# Patient Record
Sex: FEMALE | Race: WHITE | NOT HISPANIC OR LATINO | Employment: OTHER | URBAN - METROPOLITAN AREA
[De-identification: names, ages, dates, MRNs, and addresses within clinical notes are randomized per-mention and may not be internally consistent; named-entity substitution may affect disease eponyms.]

---

## 2017-01-04 ENCOUNTER — RADIANT APPOINTMENT (OUTPATIENT)
Dept: GENERAL RADIOLOGY | Facility: CLINIC | Age: 67
End: 2017-01-04
Attending: PHYSICIAN ASSISTANT
Payer: COMMERCIAL

## 2017-01-04 ENCOUNTER — OFFICE VISIT (OUTPATIENT)
Dept: FAMILY MEDICINE | Facility: CLINIC | Age: 67
End: 2017-01-04
Payer: COMMERCIAL

## 2017-01-04 VITALS
DIASTOLIC BLOOD PRESSURE: 90 MMHG | SYSTOLIC BLOOD PRESSURE: 194 MMHG | TEMPERATURE: 98.5 F | BODY MASS INDEX: 24.1 KG/M2 | OXYGEN SATURATION: 99 % | HEIGHT: 63 IN | WEIGHT: 136 LBS | HEART RATE: 84 BPM

## 2017-01-04 DIAGNOSIS — I10 HTN, GOAL BELOW 140/90: ICD-10-CM

## 2017-01-04 DIAGNOSIS — M25.561 PAIN IN BOTH KNEES, UNSPECIFIED CHRONICITY: Primary | ICD-10-CM

## 2017-01-04 DIAGNOSIS — M25.562 PAIN IN BOTH KNEES, UNSPECIFIED CHRONICITY: Primary | ICD-10-CM

## 2017-01-04 DIAGNOSIS — H91.93 HEARING IMPAIRED, BILATERAL: ICD-10-CM

## 2017-01-04 DIAGNOSIS — H61.21 IMPACTED CERUMEN OF RIGHT EAR: ICD-10-CM

## 2017-01-04 LAB
ANION GAP SERPL CALCULATED.3IONS-SCNC: 8 MMOL/L (ref 3–14)
BUN SERPL-MCNC: 20 MG/DL (ref 7–30)
CALCIUM SERPL-MCNC: 9 MG/DL (ref 8.5–10.1)
CHLORIDE SERPL-SCNC: 104 MMOL/L (ref 94–109)
CO2 SERPL-SCNC: 26 MMOL/L (ref 20–32)
CREAT SERPL-MCNC: 0.84 MG/DL (ref 0.52–1.04)
GFR SERPL CREATININE-BSD FRML MDRD: 67 ML/MIN/1.7M2
GLUCOSE SERPL-MCNC: 101 MG/DL (ref 70–99)
POTASSIUM SERPL-SCNC: 4 MMOL/L (ref 3.4–5.3)
SODIUM SERPL-SCNC: 138 MMOL/L (ref 133–144)

## 2017-01-04 PROCEDURE — 99214 OFFICE O/P EST MOD 30 MIN: CPT | Mod: 25 | Performed by: PHYSICIAN ASSISTANT

## 2017-01-04 PROCEDURE — 73562 X-RAY EXAM OF KNEE 3: CPT | Mod: RT

## 2017-01-04 PROCEDURE — 73562 X-RAY EXAM OF KNEE 3: CPT | Mod: LT

## 2017-01-04 PROCEDURE — 80048 BASIC METABOLIC PNL TOTAL CA: CPT | Performed by: PHYSICIAN ASSISTANT

## 2017-01-04 PROCEDURE — 69209 REMOVE IMPACTED EAR WAX UNI: CPT | Performed by: PHYSICIAN ASSISTANT

## 2017-01-04 PROCEDURE — 36415 COLL VENOUS BLD VENIPUNCTURE: CPT | Performed by: PHYSICIAN ASSISTANT

## 2017-01-04 RX ORDER — AMLODIPINE BESYLATE 2.5 MG/1
2.5 TABLET ORAL DAILY
Qty: 30 TABLET | Refills: 0 | Status: SHIPPED | OUTPATIENT
Start: 2017-01-04 | End: 2017-01-11

## 2017-01-04 NOTE — PATIENT INSTRUCTIONS
May take tylenol as needed for discomfort.     Follow up with a primary care provider next week for a recheck of your blood pressure.     Follow up with ENT provider in regards to your hearing and ear symptoms.    Follow up with orthopedics for further evaluation and care of your knee pain.  High Blood Pressure, New, Begin Treatment  Your blood pressure was high enough today to start treatment with medicines. Often health care providers don t know what causes high blood pressure (hypertension). But it can be controlled with lifestyle changes and medicines. High blood pressure usually has no symptoms. But it can sometimes cause headache, dizziness, blurred vision, a rushing sound in your ears, chest pain, or shortness of breath. But even without symptoms, high blood pressure that s not treated raises your risk for heart attack and stroke. High blood pressure is a serious health risk and shouldn t be ignored.    A blood pressure reading is made up of two numbers: a higher number over a lower number. The top number is the systolic pressure. The bottom number is the diastolic pressure. A normal blood pressure is less than 120 over less than 80.  High blood pressure is when either the top number is 140 or higher, or the bottom number is 90 or higher. This must be the result when taking your blood pressure a number of times. The blood pressures between normal and high are called prehypertension.  Home care  If you have high blood pressure, you should do what is listed below to lower your blood pressure. If you are taking medicines for high blood pressure, these methods may reduce or end your need for medicines in the future.    Begin a weight-loss program if you are overweight.    Cut back on how much salt you get in your diet. Here s how to do this:    Don t eat foods that have a lot of salt. These include olives, pickles, smoked meats, and salted potato chips.    Don t add salt to your food at the table.    Use only  small amounts of salt when cooking.    Begin an exercise program. Talk with your health care provider about the type of exercise program that would be best for you. It doesn't have to be hard. Even brisk walking for 20 minutes 3 times a week is a good form of exercise.    Don t take medicines that have heart stimulants. This includes many cold and sinus decongestant pills and sprays, as well as diet pills. Check the warnings about hypertension on the label. Stimulants such as amphetamine or cocaine could be lethal for someone with high blood pressure. Never take these.    Limit how much caffeine you get in your diet. Switch to caffeine-free products.    Stop smoking. If you are a long-time smoker, this can be hard. Enroll in a stop-smoking program to make it more likely that you will quit for good.    Learn how to handle stress. This is an important part of any program to lower blood pressure. Learn about relaxation methods like meditation, yoga, or biofeedback.    If your provider prescribed medicines, take them exactly as directed. Missing doses may cause your blood pressure get out of control.    Consider buying an automatic blood pressure machine. You can get one of these at most pharmacies. Use this to watch your blood pressure at home. Give the results to your provider.  Follow-up care  Because a new blood pressure medicine was started today, it s important that you have your blood pressure rechecked. This is to make sure that the medicine is working and that you have no serious side effects. Unless told otherwise, follow up with your health care provider or this facility within the next 3 days.  When to seek medical advice  Call your health care provider right away if any of these occur:    Chest pain or shortness of breath    Severe headache    Throbbing or rushing sound in the ears    Nosebleed    Sudden severe pain in your belly (abdomen)    Extreme drowsiness, confusion, or fainting    Dizziness or  dizziness with a spinning sensation (vertigo)    Weakness of an arm or leg or one side of the face    You have problems speaking or seeing     1865-1171 [employer]. [address] [place] [address], [place] [address]. All rights reserved. This information is not intended as a substitute for professional medical care. Always follow your healthcare professional's instructions.

## 2017-01-04 NOTE — Clinical Note
[facility]  [address] [facility] [address] [phone]        January 5, 2017    Dora Luna  [address] [facility] [address]            Dear Dora,    The radiologist reviewed both of your knee x-rays. They did not see evidence of an acute fracture. They did note degenerative changes. Please continue the treatment plan as we discussed at your office visit. Please call the clinic if you have any questions. Thank you.    Radha Palomo PA-C    Results for orders placed or performed in visit on 01/04/17   XR Knee Left 3 Views    Narrative    KNEE LEFT THREE VIEWS 1/4/2017 3:56 PM     HISTORY: Pain in right knee, pain in left knee       Impression    IMPRESSION: Three views of the left knee are performed. Moderate joint  space narrowing in the medial joint compartment and patellofemoral  joint compartments are noted. Minimal narrowing in the lateral joint  compartment of the knee. No fracture or dislocation. Posterior  patellar osteophytes are noted. No suprapatellar effusion is  appreciated.    KOKI CONTRERAS MD   XR Knee Right 3 Views    Narrative    RIGHT KNEE THREE VIEWS 1/4/2017 3:51 PM     HISTORY: Pain in right knee. Pain in left knee.       Impression    IMPRESSION: Three views of the right knee are performed. Mild  degenerative changes are noted in all 3 joint compartments of the  right knee including the medial, lateral and patellofemoral joint  spaces. No fracture or dislocation. Moderate joint space narrowing is  noted in the medial joint compartment. There is slight irregularity of  the cortical surface of the medial femoral condyle of uncertain  significance. Old injury remains in the differential. If patient has  persistent pain a follow-up MRI could be performed. There is narrowing  in the medial patellar facet on the sunrise view.    KOKI CONTRERAS MD   Basic metabolic panel   Result Value Ref Range    Sodium 138 133 - 144 mmol/L    Potassium 4.0 3.4 - 5.3 mmol/L     Chloride 104 94 - 109 mmol/L    Carbon Dioxide 26 20 - 32 mmol/L    Anion Gap 8 3 - 14 mmol/L    Glucose 101 (H) 70 - 99 mg/dL    Urea Nitrogen 20 7 - 30 mg/dL    Creatinine 0.84 0.52 - 1.04 mg/dL    GFR Estimate 67 >60 mL/min/1.7m2    GFR Estimate If Black 82 >60 mL/min/1.7m2    Calcium 9.0 8.5 - 10.1 mg/dL

## 2017-01-04 NOTE — PROGRESS NOTES
SUBJECTIVE:                                                    Dora Luna is a 66 year old female who presents to clinic today for the following health issues:      Concern - Pain     Onset: Years, increased 6-8 months.     Description:   Pain in knees      Intensity: moderate    Progression of Symptoms:  same    Accompanying Signs & Symptoms:  Denies swelling, numbness.  Weakness in knees, patient states they give out at times when in use.        Previous history of similar problem:   None    Precipitating factors:   Worsened by: Sitting on floor.     Alleviating factors:  Improved by: None       Therapies Tried and outcome: Ibuprofen, Excedrin, OTC arthritis medication.     Ibuprofen is not helping. Pain is worse in the left knee. She feels a grinding pain in the left knee. She is starting to have pain in the right knee and hip. Denies any injuries, swelling, bruising, erythema, numbness, and tingling.       Concern - Hearing Problems     Onset: 12/27/16     Description:   Difficulty hearing    Intensity: moderate    Progression of Symptoms:  same    Accompanying Signs & Symptoms:  Patient states feels as if she is in a tunnel and needs to turn up the volume and have people repeat things/questions.     Occasional right ear popping which helps the hearing improve for short periods of time in the right ear only.        Previous history of similar problem:   None    Precipitating factors:   Worsened by: None    Alleviating factors:  Improved by: None       Therapies Tried and outcome: Ear drops to clean ear wax.       Elevated BP in clinic. Patient has not seen a primary care provider in many years since her's retired. She does not feel the need to do any preventative care as all of her family lives into their late 90's early 100's. Therefore, she declines any preventative measures today. She denies family history of cancer. Her mother does have hypertension. She denies frequent headaches, vision changes,  "dizziness, chest pain, palpitations, and peripheral swelling. She denies any other concerns.     Problem list and histories reviewed & adjusted, as indicated.  Additional history: none    Patient Active Problem List   Diagnosis     Advanced directives, counseling/discussion     Past Surgical History   Procedure Laterality Date     Appendectomy         Social History   Substance Use Topics     Smoking status: Never Smoker      Smokeless tobacco: Not on file     Alcohol Use: No     Family History   Problem Relation Age of Onset     Hypertension Mother          Current Outpatient Prescriptions   Medication Sig Dispense Refill     amLODIPine (NORVASC) 2.5 MG tablet Take 1 tablet (2.5 mg) by mouth daily 30 tablet 0     DiphenhydrAMINE HCl (SIMPLY ALLERGY PO) Take by mouth daily as needed       No Known Allergies  BP Readings from Last 3 Encounters:   01/04/17 194/90   02/26/16 194/83    Wt Readings from Last 3 Encounters:   01/04/17 136 lb (61.689 kg)   02/26/16 137 lb (62.143 kg)                  Problem list, Medication list, Allergies, and Medical/Social/Surgical histories reviewed in [facility] and updated as appropriate.    ROS:  Constitutional, HEENT, cardiovascular, pulmonary, musculoskeletal and integumentary systems are negative, except as otherwise noted.    OBJECTIVE:                                                    /90 mmHg  Pulse 84  Temp(Src) 98.5  F (36.9  C) (Oral)  Ht 5' 2.75\" (1.594 m)  Wt 136 lb (61.689 kg)  BMI 24.28 kg/m2  SpO2 99%  Body mass index is 24.28 kg/(m^2).  GENERAL: healthy, alert and no distress  EYES: Eyes grossly normal to inspection, PERRL and EOMI  HENT: normal cephalic/atraumatic, Right ear - impacted with cerumen, Left ear - unremarkable, nose and mouth without ulcers or lesions, oropharynx clear and oral mucous membranes moist  NECK: no adenopathy, no asymmetry, masses, or scars and thyroid normal to palpation  RESP: lungs clear to auscultation - no rales, rhonchi or " wheezes  CV: regular rate and rhythm, normal S1 S2, no S3 or S4, no murmur, click or rub, no peripheral edema and peripheral pulses strong  MS: Bilateral knees: tenderness to palpation over the patella and lateral aspects bilaterally, no significant crepitus with range of motion, good range of motion without significant pain, no laxity or pain with valgus and varus stress, negative anterior and posterior drawer, no significant swelling, normal sensation and capillary refill bilaterally  SKIN: no suspicious lesions or rashes  NEURO: Normal strength and tone, mentation intact and speech normal      Re-examination after ear flushing by Medical Assistant: cerumen partially removed. TM unremarkable. Patient tolerated well.    Diagnostic Test Results:  BMP is pending  Xray of bilateral knees- degenerative changes noted, no obvious fracture seen. Radiology read is pending     ASSESSMENT/PLAN:                                                    1. Pain in both knees, unspecified chronicity  Degenerative changes noted on x-ray. Patient would like referral to orthopedics to discuss possible injections  - XR Knee Left 3 Views  - XR Knee Right 3 Views  - ORTHO  REFERRAL    2. Impacted cerumen of right ear  - REMOVE IMPACTED CERUMEN    3. Hearing impaired, bilateral  - OTOLARYNGOLOGY REFERRAL    4. HTN, goal below 140/90  New diagnosis   - amLODIPine (NORVASC) 2.5 MG tablet; Take 1 tablet (2.5 mg) by mouth daily  Dispense: 30 tablet; Refill: 0  - Basic metabolic panel    Patient Instructions   May take tylenol as needed for discomfort.     Follow up with a primary care provider next week for a recheck of your blood pressure.     Follow up with ENT provider in regards to your hearing and ear symptoms.    Follow up with orthopedics for further evaluation and care of your knee pain.  High Blood Pressure, New, Begin Treatment  Your blood pressure was high enough today to start treatment with medicines. Often health care  providers don t know what causes high blood pressure (hypertension). But it can be controlled with lifestyle changes and medicines. High blood pressure usually has no symptoms. But it can sometimes cause headache, dizziness, blurred vision, a rushing sound in your ears, chest pain, or shortness of breath. But even without symptoms, high blood pressure that s not treated raises your risk for heart attack and stroke. High blood pressure is a serious health risk and shouldn t be ignored.    A blood pressure reading is made up of two numbers: a higher number over a lower number. The top number is the systolic pressure. The bottom number is the diastolic pressure. A normal blood pressure is less than 120 over less than 80.  High blood pressure is when either the top number is 140 or higher, or the bottom number is 90 or higher. This must be the result when taking your blood pressure a number of times. The blood pressures between normal and high are called prehypertension.  Home care  If you have high blood pressure, you should do what is listed below to lower your blood pressure. If you are taking medicines for high blood pressure, these methods may reduce or end your need for medicines in the future.    Begin a weight-loss program if you are overweight.    Cut back on how much salt you get in your diet. Here s how to do this:    Don t eat foods that have a lot of salt. These include olives, pickles, smoked meats, and salted potato chips.    Don t add salt to your food at the table.    Use only small amounts of salt when cooking.    Begin an exercise program. Talk with your health care provider about the type of exercise program that would be best for you. It doesn't have to be hard. Even brisk walking for 20 minutes 3 times a week is a good form of exercise.    Don t take medicines that have heart stimulants. This includes many cold and sinus decongestant pills and sprays, as well as diet pills. Check the warnings about  hypertension on the label. Stimulants such as amphetamine or cocaine could be lethal for someone with high blood pressure. Never take these.    Limit how much caffeine you get in your diet. Switch to caffeine-free products.    Stop smoking. If you are a long-time smoker, this can be hard. Enroll in a stop-smoking program to make it more likely that you will quit for good.    Learn how to handle stress. This is an important part of any program to lower blood pressure. Learn about relaxation methods like meditation, yoga, or biofeedback.    If your provider prescribed medicines, take them exactly as directed. Missing doses may cause your blood pressure get out of control.    Consider buying an automatic blood pressure machine. You can get one of these at most pharmacies. Use this to watch your blood pressure at home. Give the results to your provider.  Follow-up care  Because a new blood pressure medicine was started today, it s important that you have your blood pressure rechecked. This is to make sure that the medicine is working and that you have no serious side effects. Unless told otherwise, follow up with your health care provider or this facility within the next 3 days.  When to seek medical advice  Call your health care provider right away if any of these occur:    Chest pain or shortness of breath    Severe headache    Throbbing or rushing sound in the ears    Nosebleed    Sudden severe pain in your belly (abdomen)    Extreme drowsiness, confusion, or fainting    Dizziness or dizziness with a spinning sensation (vertigo)    Weakness of an arm or leg or one side of the face    You have problems speaking or seeing     [id number] [employer]. [address] [place] [address], [place] [address]. All rights reserved. This information is not intended as a substitute for professional medical care. Always follow your healthcare professional's instructions.              Radha Palomo PA-C  [facility]  [place]

## 2017-01-04 NOTE — Clinical Note
[facility]  [address] [facility] [id number] [phone]        January 5, 2017    Dora Luna  [address] [facility] [address]            Dear Dora,    Attached is your recent lab. The basic metabolic panel, which evaluates your kidney function, electrolytes, and blood sugar was normal. Please follow up as instructed at your office visit. Thank you.    Radha Palomo PA-C    Results for orders placed or performed in visit on 01/04/17   XR Knee Left 3 Views    Narrative    KNEE LEFT THREE VIEWS 1/4/2017 3:56 PM     HISTORY: Pain in right knee, pain in left knee       Impression    IMPRESSION: Three views of the left knee are performed. Moderate joint  space narrowing in the medial joint compartment and patellofemoral  joint compartments are noted. Minimal narrowing in the lateral joint  compartment of the knee. No fracture or dislocation. Posterior  patellar osteophytes are noted. No suprapatellar effusion is  appreciated.    KOKI CONTRERAS MD   XR Knee Right 3 Views    Narrative    RIGHT KNEE THREE VIEWS 1/4/2017 3:51 PM     HISTORY: Pain in right knee. Pain in left knee.       Impression    IMPRESSION: Three views of the right knee are performed. Mild  degenerative changes are noted in all 3 joint compartments of the  right knee including the medial, lateral and patellofemoral joint  spaces. No fracture or dislocation. Moderate joint space narrowing is  noted in the medial joint compartment. There is slight irregularity of  the cortical surface of the medial femoral condyle of uncertain  significance. Old injury remains in the differential. If patient has  persistent pain a follow-up MRI could be performed. There is narrowing  in the medial patellar facet on the sunrise view.    KOKI CONTRERAS MD   Basic metabolic panel   Result Value Ref Range    Sodium 138 133 - 144 mmol/L    Potassium 4.0 3.4 - 5.3 mmol/L    Chloride 104 94 - 109 mmol/L    Carbon Dioxide 26 20 - 32 mmol/L     Anion Gap 8 3 - 14 mmol/L    Glucose 101 (H) 70 - 99 mg/dL    Urea Nitrogen 20 7 - 30 mg/dL    Creatinine 0.84 0.52 - 1.04 mg/dL    GFR Estimate 67 >60 mL/min/1.7m2    GFR Estimate If Black 82 >60 mL/min/1.7m2    Calcium 9.0 8.5 - 10.1 mg/dL

## 2017-01-04 NOTE — MR AVS SNAPSHOT
After Visit Summary   1/4/2017    Dora Luna    MRN: [id number]           Patient Information     Date Of Birth          1950        Visit Information        Provider Department      1/4/2017 3:00 PM Radha Palomo PA-C [facility]        Today's Diagnoses     Pain in both knees, unspecified chronicity    -  1     Impacted cerumen of right ear         Hearing impaired, bilateral         HTN, goal below 140/90           Care Instructions    May take tylenol as needed for discomfort.     Follow up with a primary care provider next week for a recheck of your blood pressure.     Follow up with ENT provider in regards to your hearing and ear symptoms.    Follow up with orthopedics for further evaluation and care of your knee pain.  High Blood Pressure, New, Begin Treatment  Your blood pressure was high enough today to start treatment with medicines. Often health care providers don t know what causes high blood pressure (hypertension). But it can be controlled with lifestyle changes and medicines. High blood pressure usually has no symptoms. But it can sometimes cause headache, dizziness, blurred vision, a rushing sound in your ears, chest pain, or shortness of breath. But even without symptoms, high blood pressure that s not treated raises your risk for heart attack and stroke. High blood pressure is a serious health risk and shouldn t be ignored.    A blood pressure reading is made up of two numbers: a higher number over a lower number. The top number is the systolic pressure. The bottom number is the diastolic pressure. A normal blood pressure is less than 120 over less than 80.  High blood pressure is when either the top number is 140 or higher, or the bottom number is 90 or higher. This must be the result when taking your blood pressure a number of times. The blood pressures between normal and high are called prehypertension.  Home care  If you have high blood pressure,  you should do what is listed below to lower your blood pressure. If you are taking medicines for high blood pressure, these methods may reduce or end your need for medicines in the future.    Begin a weight-loss program if you are overweight.    Cut back on how much salt you get in your diet. Here s how to do this:    Don t eat foods that have a lot of salt. These include olives, pickles, smoked meats, and salted potato chips.    Don t add salt to your food at the table.    Use only small amounts of salt when cooking.    Begin an exercise program. Talk with your health care provider about the type of exercise program that would be best for you. It doesn't have to be hard. Even brisk walking for 20 minutes 3 times a week is a good form of exercise.    Don t take medicines that have heart stimulants. This includes many cold and sinus decongestant pills and sprays, as well as diet pills. Check the warnings about hypertension on the label. Stimulants such as amphetamine or cocaine could be lethal for someone with high blood pressure. Never take these.    Limit how much caffeine you get in your diet. Switch to caffeine-free products.    Stop smoking. If you are a long-time smoker, this can be hard. Enroll in a stop-smoking program to make it more likely that you will quit for good.    Learn how to handle stress. This is an important part of any program to lower blood pressure. Learn about relaxation methods like meditation, yoga, or biofeedback.    If your provider prescribed medicines, take them exactly as directed. Missing doses may cause your blood pressure get out of control.    Consider buying an automatic blood pressure machine. You can get one of these at most pharmacies. Use this to watch your blood pressure at home. Give the results to your provider.  Follow-up care  Because a new blood pressure medicine was started today, it s important that you have your blood pressure rechecked. This is to make sure that the  medicine is working and that you have no serious side effects. Unless told otherwise, follow up with your health care provider or this facility within the next 3 days.  When to seek medical advice  Call your health care provider right away if any of these occur:    Chest pain or shortness of breath    Severe headache    Throbbing or rushing sound in the ears    Nosebleed    Sudden severe pain in your belly (abdomen)    Extreme drowsiness, confusion, or fainting    Dizziness or dizziness with a spinning sensation (vertigo)    Weakness of an arm or leg or one side of the face    You have problems speaking or seeing     [id number] [employer]. [address] [place] [address] 98266. All rights reserved. This information is not intended as a substitute for professional medical care. Always follow your healthcare professional's instructions.              Follow-ups after your visit        Additional Services     ORTHO  REFERRAL       [facility] is referring you to the Orthopedic  Services at [facility].       The [facility] Representative will assist you in the coordination of your Orthopedic and Musculoskeletal Care as prescribed by your physician.    The  Representative will call you within 1 business day to help schedule your appointment, or you may contact the  Representative at:    All areas ~ [phone]     Type of Referral : Non Surgical       Timeframe requested: 3 - 5 days    Coverage of these services is subject to the terms and limitations of your health insurance plan.  Please call member services at your health plan with any benefit or coverage questions.      If X-rays, CT or MRI's have been performed, please contact the facility where they were done to arrange for , prior to your scheduled appointment.  Please bring this referral request to your appointment and present it to your specialist.             "OTOLARYNGOLOGY REFERRAL       Your provider has referred you to: [facility]: [facility] [phone]   [email or web address]    Please be aware that coverage of these services is subject to the terms and limitations of your health insurance plan.  Call member services at your health plan with any benefit or coverage questions.      Please bring the following with you to your appointment:    (1) Any X-Rays, CTs or MRIs which have been performed.  Contact the facility where they were done to arrange for  prior to your scheduled appointment.   (2) List of current medications  (3) This referral request   (4) Any documents/labs given to you for this referral                  Who to contact     If you have questions or need follow up information about today's clinic visit or your schedule please contact [facility] directly at [phone].  Normal or non-critical lab and imaging results will be communicated to you by MyChart, letter or phone within 4 business days after the clinic has received the results. If you do not hear from us within 7 days, please contact the clinic through MyChart or phone. If you have a critical or abnormal lab result, we will notify you by phone as soon as possible.  Submit refill requests through path intelligence or call your pharmacy and they will forward the refill request to us. Please allow 3 business days for your refill to be completed.          Additional Information About Your Visit        [facility] Information     path intelligence lets you send messages to your doctor, view your test results, renew your prescriptions, schedule appointments and more. To sign up, go to [email or web address] . Click on \"Log in\" on the left side of the screen, which will take you to the Welcome page. Then click on \"Sign up Now\" on the right side of the page.     You will be asked to enter the access code listed below, as well as some personal information. Please " "follow the directions to create your username and password.     Your access code is: [email or web address]  Expires: 2017  4:20 PM     Your access code will  in 90 days. If you need help or a new code, please call your [place] clinic or [phone].        Care EveryWhere ID     This is your Care EveryWhere ID. This could be used by other organizations to access your [place] medical records  [id number]        Your Vitals Were     Pulse Temperature Height BMI (Body Mass Index) Pulse Oximetry       84 98.5  F (36.9  C) (Oral) 5' 2.75\" (1.594 m) 24.28 kg/m2 99%        Blood Pressure from Last 3 Encounters:   17 194/90   16 194/83    Weight from Last 3 Encounters:   17 136 lb (61.689 kg)   16 137 lb (62.143 kg)              We Performed the Following     Basic metabolic panel     ORTHO  REFERRAL     OTOLARYNGOLOGY REFERRAL     REMOVE IMPACTED CERUMEN     XR Knee Left 3 Views     XR Knee Right 3 Views          Today's Medication Changes          These changes are accurate as of: 17  4:20 PM.  If you have any questions, ask your nurse or doctor.               Start taking these medicines.        Dose/Directions    amLODIPine 2.5 MG tablet   Commonly known as:  NORVASC   Used for:  HTN, goal below 140/90   Started by:  Radha Palomo PA-C        Dose:  2.5 mg   Take 1 tablet (2.5 mg) by mouth daily   Quantity:  30 tablet   Refills:  0            Where to get your medicines      These medications were sent to [facility] [address] [address], [address]  [id number] [place] [address] [address], [facility] [address]     Phone:  [phone]    - amLODIPine 2.5 MG tablet             Primary Care Provider Office Phone # Fax #    [facility] [phone] [address] [address]. [facility] [address]        Thank you!     Thank you for choosing [facility]  for your care. Our goal is always to provide you with excellent care. " Hearing back from our patients is one way we can continue to improve our services. Please take a few minutes to complete the written survey that you may receive in the mail after your visit with us. Thank you!             Your Updated Medication List - Protect others around you: Learn how to safely use, store and throw away your medicines at www.disposemymeds.org.          This list is accurate as of: 1/4/17  4:20 PM.  Always use your most recent med list.                   Brand Name Dispense Instructions for use    amLODIPine 2.5 MG tablet    NORVASC    30 tablet    Take 1 tablet (2.5 mg) by mouth daily       SIMPLY ALLERGY PO      Take by mouth daily as needed

## 2017-01-04 NOTE — NURSING NOTE
"Chief Complaint   Patient presents with     Hearing Problem     Pain     Knees, hips, left hand, low back.        Mask not indicated for this appointment.     Initial /95 mmHg  Pulse 84  Temp(Src) 98.5  F (36.9  C) (Oral)  Ht 5' 2.75\" (1.594 m)  Wt 136 lb (61.689 kg)  BMI 24.28 kg/m2  SpO2 99% Estimated body mass index is 24.28 kg/(m^2) as calculated from the following:    Height as of this encounter: 5' 2.75\" (1.594 m).    Weight as of this encounter: 136 lb (61.689 kg)..  BP completed using cuff size: liane Farrell MA    "

## 2017-01-11 ENCOUNTER — OFFICE VISIT (OUTPATIENT)
Dept: INTERNAL MEDICINE | Facility: CLINIC | Age: 67
End: 2017-01-11
Payer: COMMERCIAL

## 2017-01-11 VITALS
OXYGEN SATURATION: 98 % | WEIGHT: 138.2 LBS | TEMPERATURE: 98.6 F | SYSTOLIC BLOOD PRESSURE: 149 MMHG | DIASTOLIC BLOOD PRESSURE: 80 MMHG | BODY MASS INDEX: 25.43 KG/M2 | HEART RATE: 90 BPM | HEIGHT: 62 IN

## 2017-01-11 DIAGNOSIS — Z78.0 ASYMPTOMATIC POSTMENOPAUSAL STATUS: ICD-10-CM

## 2017-01-11 DIAGNOSIS — I10 HTN, GOAL BELOW 140/90: ICD-10-CM

## 2017-01-11 DIAGNOSIS — Z23 NEED FOR PROPHYLACTIC VACCINATION AGAINST STREPTOCOCCUS PNEUMONIAE (PNEUMOCOCCUS): ICD-10-CM

## 2017-01-11 DIAGNOSIS — Z23 NEED FOR PROPHYLACTIC VACCINATION WITH TETANUS-DIPHTHERIA (TD): ICD-10-CM

## 2017-01-11 DIAGNOSIS — Z12.11 SCREEN FOR COLON CANCER: ICD-10-CM

## 2017-01-11 DIAGNOSIS — Z12.31 VISIT FOR SCREENING MAMMOGRAM: ICD-10-CM

## 2017-01-11 DIAGNOSIS — Z11.59 NEED FOR HEPATITIS C SCREENING TEST: ICD-10-CM

## 2017-01-11 DIAGNOSIS — I10 BENIGN ESSENTIAL HYPERTENSION: Primary | ICD-10-CM

## 2017-01-11 DIAGNOSIS — Z23 NEED FOR PROPHYLACTIC VACCINATION AND INOCULATION AGAINST INFLUENZA: ICD-10-CM

## 2017-01-11 PROCEDURE — 99213 OFFICE O/P EST LOW 20 MIN: CPT | Performed by: INTERNAL MEDICINE

## 2017-01-11 RX ORDER — AMLODIPINE BESYLATE 5 MG/1
5 TABLET ORAL DAILY
Qty: 30 TABLET | Refills: 1 | Status: SHIPPED | OUTPATIENT
Start: 2017-01-11 | End: 2017-02-07 | Stop reason: SINTOL

## 2017-01-11 NOTE — MR AVS SNAPSHOT
After Visit Summary   1/11/2017    Dora Luna    MRN: [id number]           Patient Information     Date Of Birth          1950        Visit Information        Provider Department      1/11/2017 8:00 AM Cassie Mauricio MD [facility]        Today's Diagnoses     Screen for colon cancer    -  1     Asymptomatic postmenopausal status         Visit for screening mammogram         Need for hepatitis C screening test         Need for prophylactic vaccination and inoculation against influenza         Need for prophylactic vaccination against Streptococcus pneumoniae (pneumococcus)         Need for prophylactic vaccination with tetanus-diphtheria (TD)         HTN, goal below 140/90           Care Instructions    Please increase your amlodipine from 2.5mg to 5mg daily.  return to clinic in 1 month for a blood pressure recheck.    Please consider submitting the stool test soon, to screen for colon cancer. You may  the kit in our lab.     Please consider getting a mammogram soon: you may call our clinic to schedule this appointment: [phone].    Please consider getting a bone density test: you may call our clinic to schedule this appointment: [phone].        Follow-ups after your visit        Future tests that were ordered for you today     Open Future Orders        Priority Expected Expires Ordered    DEXA HIP/PELVIS/SPINE - Future Routine  1/11/2018 1/11/2017    MA SCREENING DIGITAL BILAT - Future  (s+30) Routine  1/11/2018 1/11/2017    Fecal colorectal cancer screen (FIT) Routine 2/1/2017 4/5/2017 1/11/2017            Who to contact     If you have questions or need follow up information about today's clinic visit or your schedule please contact [facility] directly at [phone].  Normal or non-critical lab and imaging results will be communicated to you by MyChart, letter or phone within 4 business days after the clinic has received the  "results. If you do not hear from us within 7 days, please contact the clinic through [employer] or phone. If you have a critical or abnormal lab result, we will notify you by phone as soon as possible.  Submit refill requests through [employer] or call your pharmacy and they will forward the refill request to us. Please allow 3 business days for your refill to be completed.          Additional Information About Your Visit        [employer] Information     [employer] lets you send messages to your doctor, view your test results, renew your prescriptions, schedule appointments and more. To sign up, go to [email or web address] . Click on \"Log in\" on the left side of the screen, which will take you to the Welcome page. Then click on \"Sign up Now\" on the right side of the page.     You will be asked to enter the access code listed below, as well as some personal information. Please follow the directions to create your username and password.     Your access code is: [email or web address]  Expires: 2017  4:20 PM     Your access code will  in 90 days. If you need help or a new code, please call your [place] clinic or [phone].        Care EveryWhere ID     This is your Care EveryWhere ID. This could be used by other organizations to access your [place] medical records  [id number]        Your Vitals Were     Pulse Temperature Height BMI (Body Mass Index) Pulse Oximetry       90 98.6  F (37  C) (Oral) 5' 2\" (1.575 m) 25.27 kg/m2 98%        Blood Pressure from Last 3 Encounters:   17 149/80   17 194/90   16 194/83    Weight from Last 3 Encounters:   17 138 lb 3.2 oz (62.687 kg)   17 136 lb (61.689 kg)   16 137 lb (62.143 kg)                 Today's Medication Changes          These changes are accurate as of: 17  9:01 AM.  If you have any questions, ask your nurse or doctor.               These medicines have changed or have updated prescriptions.        Dose/Directions    amLODIPine 5 " MG tablet   Commonly known as:  NORVASC   This may have changed:    - medication strength  - how much to take   Used for:  HTN, goal below 140/90   Changed by:  Cassie Mauricio MD        Dose:  5 mg   Take 1 tablet (5 mg) by mouth daily   Quantity:  30 tablet   Refills:  1            Where to get your medicines      These medications were sent to [id number] - [place], [place] - [address] [address]  [address] [address], [place] [facility] [address]    Hours:  test fax successful 4/5/04  Phone:  [phone]    - amLODIPine 5 MG tablet             Primary Care Provider Office Phone # Fax #    [facility] [phone] [address] [facility]. [facility] [address]        Thank you!     Thank you for choosing [facility]  for your care. Our goal is always to provide you with excellent care. Hearing back from our patients is one way we can continue to improve our services. Please take a few minutes to complete the written survey that you may receive in the mail after your visit with us. Thank you!             Your Updated Medication List - Protect others around you: Learn how to safely use, store and throw away your medicines at www.disposemymeds.org.          This list is accurate as of: 1/11/17  9:01 AM.  Always use your most recent med list.                   Brand Name Dispense Instructions for use    amLODIPine 5 MG tablet    NORVASC    30 tablet    Take 1 tablet (5 mg) by mouth daily       SIMPLY ALLERGY PO      Take by mouth daily as needed

## 2017-01-11 NOTE — NURSING NOTE
"Chief Complaint   Patient presents with     Hypertension       Initial /91 mmHg  Pulse 90  Temp(Src) 98.6  F (37  C) (Oral)  Ht 5' 2\" (1.575 m)  Wt 138 lb 3.2 oz (62.687 kg)  BMI 25.27 kg/m2  SpO2 98% Estimated body mass index is 25.27 kg/(m^2) as calculated from the following:    Height as of this encounter: 5' 2\" (1.575 m).    Weight as of this encounter: 138 lb 3.2 oz (62.687 kg).  BP completed using cuff size: regular    Anabella Ho CMA    "

## 2017-01-11 NOTE — PATIENT INSTRUCTIONS
Please increase your amlodipine from 2.5mg to 5mg daily.  return to clinic in 1 month for a blood pressure recheck.    Please consider submitting the stool test soon, to screen for colon cancer. You may  the kit in our lab.     Please consider getting a mammogram soon: you may call our clinic to schedule this appointment: [phone].    Please consider getting a bone density test: you may call our clinic to schedule this appointment: [phone].

## 2017-01-11 NOTE — PROGRESS NOTES
"  SUBJECTIVE:                                                    Dora Luna is a 66 year old female who presents to clinic today for the following health issues:      Hypertension Follow-up      Outpatient blood pressures are being checked at home.  Results are lowest 144/86.    Low Salt Diet: low salt       Amount of exercise or physical activity: 2-3 days/week for an average of 15-30 minutes    Problems taking medications regularly: No    Medication side effects: none    Diet: low salt    Patient denies chest pain, chest pressure, shortness of breath, palpitations, headaches, visual changes, or any noted lower extremity swelling.         Problem list and histories reviewed & adjusted, as indicated.  Additional history: as documented    Patient Active Problem List   Diagnosis     Advanced directives, counseling/discussion     Past Surgical History   Procedure Laterality Date     Appendectomy         Social History   Substance Use Topics     Smoking status: Never Smoker      Smokeless tobacco: Not on file     Alcohol Use: No     Family History   Problem Relation Age of Onset     Hypertension Mother          Current Outpatient Prescriptions   Medication Sig Dispense Refill     amLODIPine (NORVASC) 5 MG tablet Take 1 tablet (5 mg) by mouth daily 30 tablet 1     DiphenhydrAMINE HCl (SIMPLY ALLERGY PO) Take by mouth daily as needed       ==============================================================  ROS:  Constitutional, HEENT, cardiovascular, pulmonary, GI, , musculoskeletal, neuro, skin, endocrine and psych systems are negative, except as otherwise noted.       OBJECTIVE:                                                    /80 mmHg  Pulse 90  Temp(Src) 98.6  F (37  C) (Oral)  Ht 5' 2\" (1.575 m)  Wt 138 lb 3.2 oz (62.687 kg)  BMI 25.27 kg/m2  SpO2 98%  Body mass index is 25.27 kg/(m^2).     Filed Vitals:    01/11/17 0803 01/11/17 0834   BP: 174/91 149/80   Pulse: 90    Temp: 98.6  F (37  C)  " "  [facility]    Height: 5' 2\" (1.575 m)    Weight: 138 lb 3.2 oz (62.687 kg)    SpO2: 98%      GENERAL APPEARANCE: healthy, alert and in no distress  EYES: Eyes grossly normal to inspection, and conjunctivae and sclerae normal  HENT: head normocephalic and atraumatic and mouth without ulcers or lesions, oropharynx clear and oral mucous membranes moist  NECK: no noticeable adenopathy, no asymmetry, masses, or scars   RESP: lungs clear to auscultation - no rales, rhonchi or wheezes  CV: regular rate and rhythm, normal S1 S2, no S3 or S4, no murmur, click or rub, no peripheral edema and peripheral pulses strong  ABDOMEN: soft, nontender, no hepatosplenomegaly, no masses and bowel sounds normal  MS: no musculoskeletal defects are noted and gait is age appropriate without ataxia  SKIN: no suspicious lesions or rashes  NEURO: mentation intact and speech normal  PSYCH: mentation appears normal and affect normal/bright.    Last Basic Metabolic Panel:  NA      138   1/4/2017   POTASSIUM      4.0   1/4/2017  CHLORIDE      104   1/4/2017  DOROTEO      9.0   1/4/2017  CO2       26   1/4/2017  BUN       20   1/4/2017  CR     0.84   1/4/2017  GLC      101   1/4/2017      ASSESSMENT/PLAN:                                                        ICD-10-CM    1. Benign essential hypertension I10    2. HTN, goal below 140/90 I10 amLODIPine (NORVASC) 5 MG tablet   3. Screen for colon cancer Z12.11 Fecal colorectal cancer screen (FIT)   4. Asymptomatic postmenopausal status Z78.0 DEXA HIP/PELVIS/SPINE - Future   5. Visit for screening mammogram Z12.31 MA SCREENING DIGITAL BILAT - Future  (s+30)   6. Need for hepatitis C screening test Z11.59    7. Need for prophylactic vaccination and inoculation against influenza Z23    8. Need for prophylactic vaccination against Streptococcus pneumoniae (pneumococcus) Z23    9. Need for prophylactic vaccination with tetanus-diphtheria (TD) Z23      (I10) Benign essential hypertension  (primary " encounter diagnosis)  (I10) HTN, goal below 140/90  Comment: uncontrolled, asymptomatic; bmp a week ago within normal limits   Plan:   As per orders above and patient instructions below.   amLODIPine (NORVASC) 5 MG tablet            (Z12.11) Screen for colon cancer  Comment:   Plan: Fecal colorectal cancer screen (FIT)            (Z78.0) Asymptomatic postmenopausal status  Comment:  Plan: DEXA HIP/PELVIS/SPINE - Future            (Z12.31) Visit for screening mammogram  Comment:   Plan: MA SCREENING DIGITAL BILAT - Future  (s+30)            (Z11.59) Need for hepatitis C screening test  Comment:   Plan:     (Z23) Need for prophylactic vaccination and inoculation against influenza  Comment:   Plan:     (Z23) Need for prophylactic vaccination against Streptococcus pneumoniae (pneumococcus)  Comment:    Plan:     (Z23) Need for prophylactic vaccination with tetanus-diphtheria (TD)  Comment:   Plan:          Patient Instructions   Please increase your amlodipine from 2.5mg to 5mg daily.  return to clinic in 1 month for a blood pressure recheck.    Please consider submitting the stool test soon, to screen for colon cancer. You may  the kit in our lab.     Please consider getting a mammogram soon: you may call our clinic to schedule this appointment: [phone].    Please consider getting a bone density test: you may call our clinic to schedule this appointment: [phone].                     Cassie Mauricio MD  [facility]

## 2017-01-14 PROBLEM — I10 HTN, GOAL BELOW 140/90: Status: ACTIVE | Noted: 2017-01-14

## 2017-01-14 PROBLEM — I10 BENIGN ESSENTIAL HYPERTENSION: Status: ACTIVE | Noted: 2017-01-14

## 2017-02-07 ENCOUNTER — TELEPHONE (OUTPATIENT)
Dept: INTERNAL MEDICINE | Facility: CLINIC | Age: 67
End: 2017-02-07

## 2017-02-07 DIAGNOSIS — I10 BENIGN ESSENTIAL HYPERTENSION: Primary | ICD-10-CM

## 2017-02-07 RX ORDER — LISINOPRIL 10 MG/1
10 TABLET ORAL DAILY
Qty: 30 TABLET | Refills: 1 | Status: SHIPPED | OUTPATIENT
Start: 2017-02-07 | End: 2017-02-22

## 2017-02-07 NOTE — TELEPHONE ENCOUNTER
Called patient, informed pt of providers note as written below, pt verbalized good understanding.    She is agreeable to the new med.   Adjusted her follow up appointment.   Advised if any new symptoms or worsening in condition call clinic.   STEPHANIE Weber RN

## 2017-02-07 NOTE — TELEPHONE ENCOUNTER
Please relay my apologies to the patient (and to you!), I had just seen a patient with a similar name, who was on the same medication.  As she cannot tolerate Norvasc, please ask her if she would be willing to try another common medication called Lisinopril. If she is, she would then be advised to make a follow-up provider appointment for her hypertension 2 weeks after she starts it, for a reassessment of her symptoms, focused physical exam and non-fasting blood work which she would do at that clinic visit, as well. I will pend the Lisinopril in case she agrees to try it (and you may then cosign it).    Thank you,  Cassie Mauricio MD

## 2017-02-07 NOTE — TELEPHONE ENCOUNTER
Called patient, she tolerated the amlodipine 2.5 mg daily well. Then on 17, she was asked to increase the med to 5 mg daily. That day, she did increase the med, and developed symptoms immediately. Headache, chest tightness, fatigue, and upset stomach.   Headache is constant and tylenol is not helping. Chest tightness is constant. No sob, dizziness, weakness, or heart palpitations. Upset stomach is nausea, no vomiting or change in bowel movements.    Patient stopped this medication all together on 2/3/17. Since then all of her symptoms have . However the headache has remained constant. Advised patient the headache could be a symptom of HTN.     Patients blood pressure has been around 150-160/80-85.     Pending appointment for 2/15/17.      To provider to advise.  Patient would like to start a med now, and keep her follow up appointment 2/15/17.    To provider to advise.  STEPHANIE Weber RN

## 2017-02-07 NOTE — TELEPHONE ENCOUNTER
I appreciate that the patient shared this information with you, about her side effects on the higher dose of Norvasc. However, I have been encouraging the patient at the last two visits now, to actually stop the Norvasc and start Coreg. At the last visit, she cited that she was afraid of possible side effects of Coreg and did not feel that the pharmacist explained them enough when she picked up the Coreg (which she reported having at home now, but not trying yet). I included a special handout on Coreg in the last AVS and encouraged her to try it.   Please call and remind her of our plan, ie, to try Coreg (and the patient does have the tendency to need to be redirected)-I would just repeat the plan, ask her if she will do it (if not, why not) and let me know (so that we can possibly start an alternative prior to the next visit).  Thank you,  Cassie Mauricio MD

## 2017-02-07 NOTE — TELEPHONE ENCOUNTER
Patient calling, she recently started amlodipine and is having a lot of side effects from it. Please call to discuss.

## 2017-02-07 NOTE — TELEPHONE ENCOUNTER
"Called patient, started to read the message below from Dr. Cassie Mauricio. Patient stopped writer and stated this must be incorrect information. Patient stated she was never asked to start coreg. \"this is not me.\"    Patient then said, that she will just forget about this issues and keep her pending appointment 2/14/17. Writer had to say two separate times, that we will send an updated message to Dr. Cassie Mauricio to address with the new information above. Patient insisted that she was never asked to take coreg. Writer stated that we will work towards a resolution, however, patient was flustered over this miscommunication. Then the phone was disconnected. Writer called back, a male (assuming it was her ) went into great detail about info that was already gathered. Writer thanked him for the info, but stated that writer would like to speak to the patient. Writer had to ask him to give the phone to patient 2 times before he handed the phone to her.   Discussed symptoms that would warrant emergent care regarding elevated blood pressure reading and symptoms. Patient verbalized understanding. Patient will be gone tomorrow, gave her permission to leave a detailed message on her vm with Dr. Cassie Mauricio message.   To provider to advise.   TOOTIE WeberN RN   "

## 2017-02-21 NOTE — PROGRESS NOTES
SUBJECTIVE:                                                    Dora Luna is a 66 year old female who presents to clinic today for the following health issues:      Hypertension Follow-up      Outpatient blood pressures are being checked at home.  Results are 140/80's patients  thinks the battery is bad on the machine.    Low Salt Diet: no added salt       Amount of exercise or physical activity: 6-7 days/week for an average of 15 minutes    Problems taking medications regularly: No    Medication side effects: none  Diet: low salt    Patient denies chest pain, chest pressure, shortness of breath, palpitations, headaches, visual changes, or any noted lower extremity swelling.         Problem list and histories reviewed & adjusted, as indicated.  Additional history: as documented    Patient Active Problem List   Diagnosis     Advanced directives, counseling/discussion     HTN, goal below 140/90     Benign essential hypertension     Past Surgical History   Procedure Laterality Date     Appendectomy         Social History   Substance Use Topics     Smoking status: Never Smoker     Smokeless tobacco: Not on file     Alcohol use No     Family History   Problem Relation Age of Onset     Hypertension Mother          Current Outpatient Prescriptions   Medication Sig Dispense Refill     lisinopril (PRINIVIL/ZESTRIL) 20 MG tablet Take 1 tablet (20 mg) by mouth daily 90 tablet 0     DiphenhydrAMINE HCl (SIMPLY ALLERGY PO) Take by mouth daily as needed       Problem list, Medication list, Allergies, and Medical/Social/Surgical histories reviewed in [employer] and updated as appropriate.    ==============================================================  ROS:  Constitutional, HEENT, cardiovascular, pulmonary, GI, , musculoskeletal, neuro, skin, endocrine and psych systems are negative, except as otherwise noted.       OBJECTIVE:                                                    /80  Pulse 79  Temp 98.3  F (36.8   C) (Oral)  Wt 135 lb 12.8 oz (61.6 kg)  SpO2 97%  BMI 24.84 kg/m2  Body mass index is 24.84 kg/(m^2).     Vitals:    02/22/17 1514 02/22/17 1541   BP: (P) 164/88 164/80   BP Location: (P) Right arm    Patient Position: (P) Chair    Cuff Size: (P) Adult Regular    Pulse: 79    Temp: 98.3  F (36.8  C)    TempSrc: Oral    SpO2: 97%    Weight: 135 lb 12.8 oz (61.6 kg)        GENERAL APPEARANCE: healthy, alert and in no distress  RESP: lungs clear to auscultation - no rales, rhonchi or wheezes  CV: regular rate and rhythm, normal S1 S2, no S3 or S4, no murmur, click or rub, no peripheral edema and peripheral pulses strong  PSYCH: mentation appears normal and affect normal/bright.     Results for orders placed or performed in visit on 02/22/17   Hepatitis C Screen Reflex to HCV RNA Quant and Genotype   Result Value Ref Range    Hepatitis C Antibody  NR     Nonreactive   Assay performance characteristics have not been established for newborns,   infants, and children     Lipid panel reflex to direct LDL   Result Value Ref Range    Cholesterol 248 (H) <200 mg/dL    Triglycerides 76 <150 mg/dL    HDL Cholesterol 96 >49 mg/dL    LDL Cholesterol Calculated 137 (H) <100 mg/dL    Non HDL Cholesterol 152 (H) <130 mg/dL   Basic metabolic panel   Result Value Ref Range    Sodium 140 133 - 144 mmol/L    Potassium 4.0 3.4 - 5.3 mmol/L    Chloride 104 94 - 109 mmol/L    Carbon Dioxide 24 20 - 32 mmol/L    Anion Gap 12 3 - 14 mmol/L    Glucose 89 70 - 99 mg/dL    Urea Nitrogen 14 7 - 30 mg/dL    Creatinine 0.67 0.52 - 1.04 mg/dL    GFR Estimate 88 >60 mL/min/1.7m2    GFR Estimate If Black >90   GFR Calc   >60 mL/min/1.7m2    Calcium 9.6 8.5 - 10.1 mg/dL      The 10-year ASCVD risk score (Trell HOLDER Jr., et al., 2013) is: 13.5%    Values used to calculate the score:      Age: 66 years      Sex: Female      Is Non- : No      Diabetic: No      Tobacco smoker: No      Systolic Blood Pressure: 164  mmHg      Is Prescribed Antihypertensives: Yes      HDL Cholesterol: 96 mg/dL      Total Cholesterol: 248 mg/dL      ASSESSMENT/PLAN:                                                        ICD-10-CM    1. Benign essential hypertension I10 Lipid panel reflex to direct LDL     Basic metabolic panel     lisinopril (PRINIVIL/ZESTRIL) 20 MG tablet     Basic metabolic panel   2. At risk for cardiovascular event Z78.9    3. Screen for colon cancer Z12.11    4. Asymptomatic postmenopausal status Z78.0 DEXA HIP/PELVIS/SPINE - Future   5. Visit for screening mammogram Z12.31 MA SCREENING DIGITAL BILAT - Future  (s+30)   6. Need for hepatitis C screening test Z11.59 Hepatitis C Screen Reflex to HCV RNA Quant and Genotype   7. Need for prophylactic vaccination against Streptococcus pneumoniae (pneumococcus) Z23    8. Need for prophylactic vaccination with tetanus-diphtheria (TD) Z23      Time spent coordinating care was approximately 30 minutes out of a total of approximately 40 minutes (which was the total duration of the appointment) including the diagnosis, prognosis and treatment of the above medical conditions.     (I10) Benign essential hypertension  (primary encounter diagnosis)  Comment: uncontrolled, asympt  Plan:  As per orders above and patient instructions below.    Lipid panel reflex to direct LDL, Basic         metabolic panel, lisinopril (PRINIVIL/ZESTRIL)         20 MG tablet, Basic metabolic panel            (Z78.9) At risk for cardiovascular event  Comment: 10 yr CVD risk > 7.5%;   Plan: indicated for a moderate intensity statin (kary given age and dx of hypertension)-will notify patient     (Z12.11) Screen for colon cancer      (Z78.0) Asymptomatic postmenopausal status  Comment:   Plan: DEXA HIP/PELVIS/SPINE - Future         (Z12.31) Visit for screening mammogram  Comment:   Plan: MA SCREENING DIGITAL BILAT - Future  (s+30)      (Z11.59) Need for hepatitis C screening test  Comment:   Plan: Hepatitis C Screen  Reflex to HCV RNA Quant and         Genotype        (Z23) Need for prophylactic vaccination against Streptococcus pneumoniae (pneumococcus)      (Z23) Need for prophylactic vaccination with tetanus-diphtheria (TD)         Patient Instructions   Please increase the lisinopril from 10mg to 20mg daily.  Please return for a blood pressure check with the ancillary nurse in 2 weeks as well as a non-fasting lab test the same day.  We will advise you further based on lab and blood pressure results in 2 weeks.                    Cassie Mauricio MD  [facility]

## 2017-02-22 ENCOUNTER — OFFICE VISIT (OUTPATIENT)
Dept: INTERNAL MEDICINE | Facility: CLINIC | Age: 67
End: 2017-02-22
Payer: COMMERCIAL

## 2017-02-22 VITALS
DIASTOLIC BLOOD PRESSURE: 80 MMHG | TEMPERATURE: 98.3 F | SYSTOLIC BLOOD PRESSURE: 164 MMHG | OXYGEN SATURATION: 97 % | HEART RATE: 79 BPM | WEIGHT: 135.8 LBS | BODY MASS INDEX: 24.84 KG/M2

## 2017-02-22 DIAGNOSIS — Z12.11 SCREEN FOR COLON CANCER: ICD-10-CM

## 2017-02-22 DIAGNOSIS — Z23 NEED FOR PROPHYLACTIC VACCINATION AGAINST STREPTOCOCCUS PNEUMONIAE (PNEUMOCOCCUS): ICD-10-CM

## 2017-02-22 DIAGNOSIS — Z23 NEED FOR PROPHYLACTIC VACCINATION WITH TETANUS-DIPHTHERIA (TD): ICD-10-CM

## 2017-02-22 DIAGNOSIS — Z91.89 AT RISK FOR CARDIOVASCULAR EVENT: ICD-10-CM

## 2017-02-22 DIAGNOSIS — Z12.31 VISIT FOR SCREENING MAMMOGRAM: ICD-10-CM

## 2017-02-22 DIAGNOSIS — I10 BENIGN ESSENTIAL HYPERTENSION: Primary | ICD-10-CM

## 2017-02-22 DIAGNOSIS — Z11.59 NEED FOR HEPATITIS C SCREENING TEST: ICD-10-CM

## 2017-02-22 DIAGNOSIS — Z78.0 ASYMPTOMATIC POSTMENOPAUSAL STATUS: ICD-10-CM

## 2017-02-22 LAB
ANION GAP SERPL CALCULATED.3IONS-SCNC: 12 MMOL/L (ref 3–14)
BUN SERPL-MCNC: 14 MG/DL (ref 7–30)
CALCIUM SERPL-MCNC: 9.6 MG/DL (ref 8.5–10.1)
CHLORIDE SERPL-SCNC: 104 MMOL/L (ref 94–109)
CHOLEST SERPL-MCNC: 248 MG/DL
CO2 SERPL-SCNC: 24 MMOL/L (ref 20–32)
CREAT SERPL-MCNC: 0.67 MG/DL (ref 0.52–1.04)
GFR SERPL CREATININE-BSD FRML MDRD: 88 ML/MIN/1.7M2
GLUCOSE SERPL-MCNC: 89 MG/DL (ref 70–99)
HDLC SERPL-MCNC: 96 MG/DL
LDLC SERPL CALC-MCNC: 137 MG/DL
NONHDLC SERPL-MCNC: 152 MG/DL
POTASSIUM SERPL-SCNC: 4 MMOL/L (ref 3.4–5.3)
SODIUM SERPL-SCNC: 140 MMOL/L (ref 133–144)
TRIGL SERPL-MCNC: 76 MG/DL

## 2017-02-22 PROCEDURE — 86803 HEPATITIS C AB TEST: CPT | Performed by: INTERNAL MEDICINE

## 2017-02-22 PROCEDURE — 80061 LIPID PANEL: CPT | Performed by: INTERNAL MEDICINE

## 2017-02-22 PROCEDURE — 80048 BASIC METABOLIC PNL TOTAL CA: CPT | Performed by: INTERNAL MEDICINE

## 2017-02-22 PROCEDURE — 99214 OFFICE O/P EST MOD 30 MIN: CPT | Performed by: INTERNAL MEDICINE

## 2017-02-22 PROCEDURE — 36415 COLL VENOUS BLD VENIPUNCTURE: CPT | Performed by: INTERNAL MEDICINE

## 2017-02-22 RX ORDER — LISINOPRIL 20 MG/1
20 TABLET ORAL DAILY
Qty: 90 TABLET | Refills: 0 | Status: SHIPPED | OUTPATIENT
Start: 2017-02-22 | End: 2017-06-09

## 2017-02-22 NOTE — MR AVS SNAPSHOT
After Visit Summary   2/22/2017    Dora Luna    MRN: [id number]           Patient Information     Date Of Birth          1950        Visit Information        Provider Department      2/22/2017 3:00 PM Cassie Mauricio MD [facility]        Today's Diagnoses     Benign essential hypertension    -  1    Screen for colon cancer        Asymptomatic postmenopausal status        Visit for screening mammogram        Need for hepatitis C screening test        Need for prophylactic vaccination against Streptococcus pneumoniae (pneumococcus)        Need for prophylactic vaccination with tetanus-diphtheria (TD)          Care Instructions    Please increase the lisinopril from 10mg to 20mg daily.  Please return for a blood pressure check with the ancillary nurse in 2 weeks as well as a non-fasting lab test the same day.  We will advise you further based on lab and blood pressure results in 2 weeks.             Follow-ups after your visit        Future tests that were ordered for you today     Open Future Orders        Priority Expected Expires Ordered    Basic metabolic panel Routine 3/8/2017 2/22/2018 2/22/2017    DEXA HIP/PELVIS/SPINE - Future Routine  2/22/2018 2/22/2017    MA SCREENING DIGITAL BILAT - Future  (s+30) Routine  2/22/2018 2/22/2017            Who to contact     If you have questions or need follow up information about today's clinic visit or your schedule please contact [facility] directly at [phone].  Normal or non-critical lab and imaging results will be communicated to you by MyChart, letter or phone within 4 business days after the clinic has received the results. If you do not hear from us within 7 days, please contact the clinic through MyChart or phone. If you have a critical or abnormal lab result, we will notify you by phone as soon as possible.  Submit refill requests through [employer] or call your pharmacy and they will forward the  "refill request to us. Please allow 3 business days for your refill to be completed.          Additional Information About Your Visit        [employer] Information     [email or web address] lets you send messages to your doctor, view your test results, renew your prescriptions, schedule appointments and more. To sign up, go to [email or web address] . Click on \"Log in\" on the left side of the screen, which will take you to the Welcome page. Then click on \"Sign up Now\" on the right side of the page.     You will be asked to enter the access code listed below, as well as some personal information. Please follow the directions to create your username and password.     Your access code is: [email or web address]  Expires: 2017  4:20 PM     Your access code will  in 90 days. If you need help or a new code, please call your [facility] clinic or [phone].        Care EveryWhere ID     This is your Care EveryWhere ID. This could be used by other organizations to access your [facility] medical records  [id number]        Your Vitals Were     Pulse Temperature Pulse Oximetry BMI (Body Mass Index)          79 98.3  F (36.8  C) (Oral) 97% 24.84 kg/m2         Blood Pressure from Last 3 Encounters:   17 164/80   17 149/80   17 194/90    Weight from Last 3 Encounters:   17 135 lb 12.8 oz (61.6 kg)   17 138 lb 3.2 oz (62.7 kg)   17 136 lb (61.7 kg)              We Performed the Following     Basic metabolic panel     Hepatitis C Screen Reflex to HCV RNA Quant and Genotype     Lipid panel reflex to direct LDL          Today's Medication Changes          These changes are accurate as of: 17  4:06 PM.  If you have any questions, ask your nurse or doctor.               These medicines have changed or have updated prescriptions.        Dose/Directions    lisinopril 20 MG tablet   Commonly known as:  PRINIVIL/ZESTRIL   This may have changed:    - medication strength  - how much to take   Used for:  Benign " essential hypertension   Changed by:  Cassie Mauricio MD        Dose:  20 mg   Take 1 tablet (20 mg) by mouth daily   Quantity:  90 tablet   Refills:  0            Where to get your medicines      These medications were sent to [id number] - [place], [place] - [address]  [address], [place] [address]    Hours:  test fax successful 4/5/04 kr Phone:  [phone]     lisinopril 20 MG tablet                Primary Care Provider Office Phone # Fax #    [facility] [phone] [address] [facility] [address]        Thank you!     Thank you for choosing [facility]  for your care. Our goal is always to provide you with excellent care. Hearing back from our patients is one way we can continue to improve our services. Please take a few minutes to complete the written survey that you may receive in the mail after your visit with us. Thank you!             Your Updated Medication List - Protect others around you: Learn how to safely use, store and throw away your medicines at www.disposemymeds.org.          This list is accurate as of: 2/22/17  4:06 PM.  Always use your most recent med list.                   Brand Name Dispense Instructions for use    lisinopril 20 MG tablet    PRINIVIL/ZESTRIL    90 tablet    Take 1 tablet (20 mg) by mouth daily       SIMPLY ALLERGY PO      Take by mouth daily as needed

## 2017-02-22 NOTE — PATIENT INSTRUCTIONS
Please increase the lisinopril from 10mg to 20mg daily.  Please return for a blood pressure check with the ancillary nurse in 2 weeks as well as a non-fasting lab test the same day.  We will advise you further based on lab and blood pressure results in 2 weeks.

## 2017-02-22 NOTE — NURSING NOTE
"Chief Complaint   Patient presents with     Hypertension       Initial Pulse 79  Temp 98.3  F (36.8  C) (Oral)  Wt 135 lb 12.8 oz (61.6 kg)  SpO2 97%  BMI 24.84 kg/m2 Estimated body mass index is 24.84 kg/(m^2) as calculated from the following:    Height as of 1/11/17: 5' 2\" (1.575 m).    Weight as of this encounter: 135 lb 12.8 oz (61.6 kg).  Medication Reconciliation: complete    Anabella Ho CMA  "

## 2017-02-23 LAB — HCV AB SERPL QL IA: NORMAL

## 2017-02-28 ENCOUNTER — TELEPHONE (OUTPATIENT)
Dept: INTERNAL MEDICINE | Facility: CLINIC | Age: 67
End: 2017-02-28

## 2017-02-28 DIAGNOSIS — Z91.89 AT RISK FOR CARDIOVASCULAR EVENT: Primary | ICD-10-CM

## 2017-02-28 RX ORDER — SIMVASTATIN 20 MG
20 TABLET ORAL AT BEDTIME
Qty: 90 TABLET | Refills: 3 | Status: SHIPPED | OUTPATIENT
Start: 2017-02-28 | End: 2018-06-14

## 2017-02-28 NOTE — LETTER
[facility]  [address] [facility] [id number] [phone]        March 2, 2017    Dora Luna  [address] [facility] [address]            Dear Dora,     Your labwork has processed and shows that your current cholesterol numbers are in need of some improvement. According to your current numbers, you have a 13% chance of having a heart attack or stroke in the next 10 years and this is considered too high a risk. I would therefore recommend that you start taking a medication called Simvastatin at night. This medication not only lowers cholesterol, but also has antioxidant properties, as well, and has been proven to lower the risk of heart attacks and strokes. The most common possible side effect is leg muscle pain, and this is why we tell patient to take this medication at night. I will send this medication to your pharmacy.     You do not have Hepatitis C. The test for Hepatitis C test was done, as you will recall, because we are screening people born within a certain time period for this infection, according to new medical guidelines.  Your kidney function and blood electrolytes are within normal limits.      Otherwise, please continue the treatment plan that we discussed at your last clinic visit and let me know if you have any questions via [employer] or by calling [phone].  Cassie Mauricio MD    Results for orders placed or performed in visit on 02/22/17   Hepatitis C Screen Reflex to HCV RNA Quant and Genotype   Result Value Ref Range    Hepatitis C Antibody  NR     Nonreactive   Assay performance characteristics have not been established for newborns,   infants, and children     Lipid panel reflex to direct LDL   Result Value Ref Range    Cholesterol 248 (H) <200 mg/dL    Triglycerides 76 <150 mg/dL    HDL Cholesterol 96 >49 mg/dL    LDL Cholesterol Calculated 137 (H) <100 mg/dL    Non HDL Cholesterol 152 (H) <130 mg/dL   Basic metabolic panel   Result  Value Ref Range    Sodium 140 133 - 144 mmol/L    Potassium 4.0 3.4 - 5.3 mmol/L    Chloride 104 94 - 109 mmol/L    Carbon Dioxide 24 20 - 32 mmol/L    Anion Gap 12 3 - 14 mmol/L    Glucose 89 70 - 99 mg/dL    Urea Nitrogen 14 7 - 30 mg/dL    Creatinine 0.67 0.52 - 1.04 mg/dL    GFR Estimate 88 >60 mL/min/1.7m2    GFR Estimate If Black >90   GFR Calc   >60 mL/min/1.7m2    Calcium 9.6 8.5 - 10.1 mg/dL

## 2017-03-01 NOTE — TELEPHONE ENCOUNTER
Please call and advise the patient as follows, and also send a letter with the same text and attach recent test results [statements which are in bold and in square brackets, like this sentence, is for clinic staff and should not be included in the text of the letter to the patient]:    Dear Dora,    Your labwork has processed and shows that your current cholesterol numbers are in need of some improvement.  According to your current numbers, you have a 13% chance of having a heart attack or stroke in the next 10 years and this is considered too high a risk. I would therefore recommend that you start taking a medication called Simvastatin at night. This medication not only lowers cholesterol, but also has antioxidant properties, as well, and has been proven to lower the risk of heart attacks and strokes. The most common possible side effect is leg muscle pain, and this is why we tell patient to take this medication at night. I will send this medication to your pharmacy.    You do not have Hepatitis C.  The test for Hepatitis C test was done, as you will recall, because we are screening people born within a certain time period for this infection, according to new medical guidelines.  Your kidney function and blood electrolytes are within normal limits.     Otherwise, please continue the treatment plan that we discussed at your last clinic visit and let me know if you have any questions via [employer] or by calling [phone].  Cassie Mauricio MD

## 2017-03-02 NOTE — TELEPHONE ENCOUNTER
Patient stopped in for her lab results instead of waiting for them to be mailed.  Colleen Trujillo, cma

## 2017-03-02 NOTE — TELEPHONE ENCOUNTER
Patient informed of result note as below. Patient verbalized understanding.  - please mail results and result note to patient..Elva SOMMERSN, RN, CPN

## 2017-03-09 ENCOUNTER — TELEPHONE (OUTPATIENT)
Dept: INTERNAL MEDICINE | Facility: CLINIC | Age: 67
End: 2017-03-09

## 2017-03-09 NOTE — LETTER
[facility]  [address] [facility] [address] [phone]          March 9, 2017    Dora Luna  [address] [facility] [address]          Our records indicate that your blood pressure was elevated at your last provider visit. Please schedule an  ancilliary nurse only visit to have your blood pressure rechecked. It is also time to have your annual mammogram and colon cancer screening, Please contact the lab to  fit cards or call to schedule a colonoscopy.  Mammograms are available at the [facility]. Please call to schedule.     Monitoring and managing your preventative and chronic health conditions are very important to us.     If you have received your health care elsewhere, please provide us with that information so it can be documented in your chart.    Please call [phone] or message us through your [employer] account to schedule an appointment or provide information for your chart.     I look forward to seeing you and working with you on your health care needs.     Sincerely,       SOLA Kyle   on behalf of   Cassie Mauricio MD                *If you have already scheduled an appointment, please disregard this reminder

## 2017-03-09 NOTE — TELEPHONE ENCOUNTER
Panel Management Review      Patient has the following on her problem list:     Hypertension   Last three blood pressure readings:  BP Readings from Last 3 Encounters:   02/22/17 164/80   01/11/17 149/80   01/04/17 194/90     Blood pressure: FAILED    HTN Guidelines:  Age 18-59 BP range:  Less than 140/90  Age 60-85 with Diabetes:  Less than 140/90  Age 60-85 without Diabetes:  less than 150/90      Composite cancer screening  Chart review shows that this patient is due/due soon for the following Mammogram and Fecal Colorectal (FIT)  Summary:    Patient is due/failing the following:   BP CHECK, FIT and MAMMOGRAM    Action needed:   Patient needs ancillary nurse office visit for hypertension, lab visit to obtain fit cards and a mammogram .    Type of outreach:    Sent letter.    Questions for provider review:    none                                                                                                                                    Anabella Ho CMA       Chart routed to NA .

## 2017-03-14 ENCOUNTER — ALLIED HEALTH/NURSE VISIT (OUTPATIENT)
Dept: NURSING | Facility: CLINIC | Age: 67
End: 2017-03-14
Payer: COMMERCIAL

## 2017-03-14 VITALS — HEART RATE: 76 BPM | SYSTOLIC BLOOD PRESSURE: 150 MMHG | DIASTOLIC BLOOD PRESSURE: 86 MMHG

## 2017-03-14 DIAGNOSIS — I10 HTN, GOAL BELOW 140/90: Primary | ICD-10-CM

## 2017-03-14 PROCEDURE — 99207 ZZC NO CHARGE NURSE ONLY: CPT

## 2017-03-14 NOTE — NURSING NOTE
SUBJECTIVE:  Dora Luna is an 67 year old female who presents for a blood pressure check.    The reason for the visit is:  an elevated blood pressure was noted elsewhere and they were told to come for a recheck    The patient reports that she IS taking the medication as prescribed.     Current Outpatient Prescriptions   Medication     simvastatin (ZOCOR) 20 MG tablet     lisinopril (PRINIVIL/ZESTRIL) 20 MG tablet     DiphenhydrAMINE HCl (SIMPLY ALLERGY PO)     No current facility-administered medications for this visit.        No Known Allergies      OBJECTIVE:  Please get a blood pressure AND a pulse.  A height is also needed if has not been done in the past year.    BP (!) 172/91 (BP Location: Right arm, Cuff Size: Adult Regular)  Pulse 76        If patient has diagnosis of HYPERTENSION, is there a goal on the problem list? Yes  Patient Active Problem List   Diagnosis     Advanced directives, counseling/discussion     HTN, goal below 140/90     Benign essential hypertension       Plan:    Systolic BP    Greater than or equal to 100  OR Less than  140    Diastolic BP    Greater than or equal to 70 OR less than 90    Pulse    Pulse greater than 60 or less than 100      If all the above three parameters are met, patient to go home. Chart CC to Primary Care Provider  If any one of the above three parameters is not met notify RN or provider.      RN to assess.  Jayna Giles MA

## 2017-03-14 NOTE — MR AVS SNAPSHOT
"              After Visit Summary   3/14/2017    Dora Luna    MRN: [id number]           Patient Information     Date Of Birth          1950        Visit Information        Provider Department      3/14/2017 7:30 AM AN ANCILLARY [facility]        Today's Diagnoses     HTN, goal below 140/90    -  1       Follow-ups after your visit        Who to contact     If you have questions or need follow up information about today's clinic visit or your schedule please contact [facility] directly at [phone].  Normal or non-critical lab and imaging results will be communicated to you by MyChart, letter or phone within 4 business days after the clinic has received the results. If you do not hear from us within 7 days, please contact the clinic through [employer] or phone. If you have a critical or abnormal lab result, we will notify you by phone as soon as possible.  Submit refill requests through [employer] or call your pharmacy and they will forward the refill request to us. Please allow 3 business days for your refill to be completed.          Additional Information About Your Visit        MyChart Information     [employer] Here lets you send messages to your doctor, view your test results, renew your prescriptions, schedule appointments and more. To sign up, go to [email or web address] . Click on \"Log in\" on the left side of the screen, which will take you to the Welcome page. Then click on \"Sign up Now\" on the right side of the page.     You will be asked to enter the access code listed below, as well as some personal information. Please follow the directions to create your username and password.     Your access code is: [email or web address]  Expires: 2017  2:10 PM     Your access code will  in 90 days. If you need help or a new code, please call your [facility] or [phone].        Care EveryWhere ID     This is your Care EveryWhere ID. This could be used by other organizations " to access your [facility] medical records  [id number]        Your Vitals Were     Pulse                   76            Blood Pressure from Last 3 Encounters:   03/14/17 150/86   02/22/17 164/80   01/11/17 149/80    Weight from Last 3 Encounters:   02/22/17 135 lb 12.8 oz (61.6 kg)   01/11/17 138 lb 3.2 oz (62.7 kg)   01/04/17 136 lb (61.7 kg)              Today, you had the following     No orders found for display       Primary Care Provider Office Phone # Fax #    [facility] [phone] [address] [facility] [address]        Thank you!     Thank you for choosing [facility]  for your care. Our goal is always to provide you with excellent care. Hearing back from our patients is one way we can continue to improve our services. Please take a few minutes to complete the written survey that you may receive in the mail after your visit with us. Thank you!             Your Updated Medication List - Protect others around you: Learn how to safely use, store and throw away your medicines at www.disposemymeds.org.          This list is accurate as of: 3/14/17 11:59 PM.  Always use your most recent med list.                   Brand Name Dispense Instructions for use    lisinopril 20 MG tablet    PRINIVIL/ZESTRIL    90 tablet    Take 1 tablet (20 mg) by mouth daily       SIMPLY ALLERGY PO      Take by mouth daily as needed       simvastatin 20 MG tablet    ZOCOR    90 tablet    Take 1 tablet (20 mg) by mouth At Bedtime

## 2017-03-29 ENCOUNTER — TELEPHONE (OUTPATIENT)
Dept: INTERNAL MEDICINE | Facility: CLINIC | Age: 67
End: 2017-03-29

## 2017-03-29 DIAGNOSIS — Z12.11 SCREENING FOR COLON CANCER: Primary | ICD-10-CM

## 2017-03-29 NOTE — TELEPHONE ENCOUNTER
Letter or MyChart message sent to remind patient of cancer screenings which are due. Please review orders and close encounter.  Anabella Ho, CMA

## 2017-03-29 NOTE — LETTER
[facility]  [address] [facility] [address] [phone]          March 29, 2017    Dora Luna  [address] [facility] [address]          Our records indicate that you are due for a mammogram and colon cancer screening.   Monitoring and managing your preventative and chronic health conditions are very important to us.     If you have received your health care elsewhere, please provide us with that information so it can be documented in your chart.    Please call [phone] or message us through your [employer] account to schedule an appointment or provide information for your chart.     I look forward to seeing you and working with you on your health care needs.         Sincerely,       SOLA Kyle   on behalf of   Cassie Mauricio MD

## 2017-04-03 NOTE — TELEPHONE ENCOUNTER
Signed only the FIT, as that was discussed and advised by me 2 months ago.    Thank you,  Cassie Mauricio MD

## 2017-06-09 DIAGNOSIS — I10 BENIGN ESSENTIAL HYPERTENSION: ICD-10-CM

## 2017-06-09 RX ORDER — LISINOPRIL 20 MG/1
20 TABLET ORAL DAILY
Qty: 30 TABLET | Refills: 0 | Status: SHIPPED | OUTPATIENT
Start: 2017-06-09 | End: 2018-06-29 | Stop reason: ALTCHOICE

## 2017-06-09 NOTE — LETTER
[facility]                                             [address] [place]  [address]    June 13, 2017    Dora Luna  [address] [facility] [address]    Dear Dora,       We recently received a refill request for lisinopril (PRINIVIL/ZESTRIL) 20 MG tablet.  We have refilled this for a one time 30 day supply only because you are due for a:    Blood pressure office visit    You are also due for a colonoscopy, mammogram, and dexa scan.      Please call at your earliest convenience so that there will not be a delay with your future refills.          Thank you,   Your [facility] Care Team/  [phone]

## 2017-08-10 ENCOUNTER — TELEPHONE (OUTPATIENT)
Dept: INTERNAL MEDICINE | Facility: CLINIC | Age: 67
End: 2017-08-10

## 2017-08-10 NOTE — TELEPHONE ENCOUNTER
Panel Management Review      Patient has the following on her problem list:     Hypertension   Last three blood pressure readings:  BP Readings from Last 3 Encounters:   03/14/17 150/86   02/22/17 164/80   01/11/17 149/80     Blood pressure: FAILED    HTN Guidelines:  Age 18-59 BP range:  Less than 140/90  Age 60-85 with Diabetes:  Less than 140/90  Age 60-85 without Diabetes:  less than 150/90        Composite cancer screening  Chart review shows that this patient is due/due soon for the following Mammogram and Colonoscopy  Summary:    Patient is due/failing the following:   BP CHECK, COLONOSCOPY and MAMMOGRAM    Action needed:   Patient needs office visit for hypertension.    Type of outreach:    Sent letter.    Questions for provider review:    None                                                                                                                                    Anabella Ho CMA       Chart routed to close .

## 2017-08-10 NOTE — LETTER
[facility]  [address] [facility] [address]  Phone: [phone]  Fax: [phone]    08/10/17    Dora Luna  [address] [facility] [address]        To whom it may concern:     Our records indicate that you are due for an appointment with  Dr Mauricio for your hypertension.      Monitoring and managing your preventative and chronic health conditions are very important to us.     If you have received your health care elsewhere, please provide us with that information so it can be documented in your chart.    Please call [phone] or message us through your [employer] account to schedule an appointment or provide information for your chart.     I look forward to seeing you and working with you on your health care needs.         Sincerely,       SOLA Kyle   on behalf of   Cassie Mauricio MD

## 2017-11-09 ENCOUNTER — TELEPHONE (OUTPATIENT)
Dept: INTERNAL MEDICINE | Facility: CLINIC | Age: 67
End: 2017-11-09

## 2017-11-09 NOTE — TELEPHONE ENCOUNTER
Panel Management Review      Patient has the following on her problem list: None      Composite cancer screening  Chart review shows that this patient is due/due soon for the following Mammogram and Colonoscopy  Summary:    Patient is due/failing the following:   COLONOSCOPY and MAMMOGRAM    Action needed:   Patient needs referral/order: mammo and colon    Type of outreach:    Sent letter.    Questions for provider review:    None                                                                                                                                    SOLA Sanches   10:09 AM  11/9/2017         Chart routed to self .

## 2017-11-16 ENCOUNTER — TELEPHONE (OUTPATIENT)
Dept: INTERNAL MEDICINE | Facility: CLINIC | Age: 67
End: 2017-11-16

## 2018-02-19 ENCOUNTER — TELEPHONE (OUTPATIENT)
Dept: INTERNAL MEDICINE | Facility: CLINIC | Age: 68
End: 2018-02-19

## 2018-06-13 NOTE — PROGRESS NOTES
"SUBJECTIVE:                                                    Dora Luna is a 68 year old female who presents to clinic today for the following health issues:    Hypertension Follow-up      Outpatient blood pressures {ISCHECKIN}    Low Salt Diet: { :012359::\"no added salt\"}      Amount of exercise or physical activity: {Exercise frequency days per week:065543}    Problems taking medications regularly: {Med Problems:604614::\"No\"}    Medication side effects: {CHRONIC MED SIDE EFFECTS:842728::\"none\"}    Diet: { :402504}        Problem list and histories reviewed & adjusted, as indicated.  Additional history: {NONE - AS DOCUMENTED:460924::\"as documented\"}    {additional problems for provider to add:301958}    {HIST REVIEW/ LINKS 2:117805}    {PROVIDER CHARTING PREFERENCE:910470}    "

## 2018-06-14 ENCOUNTER — OFFICE VISIT (OUTPATIENT)
Dept: FAMILY MEDICINE | Facility: CLINIC | Age: 68
End: 2018-06-14
Payer: COMMERCIAL

## 2018-06-14 VITALS
HEIGHT: 62 IN | DIASTOLIC BLOOD PRESSURE: 96 MMHG | RESPIRATION RATE: 16 BRPM | SYSTOLIC BLOOD PRESSURE: 167 MMHG | OXYGEN SATURATION: 98 % | WEIGHT: 136.6 LBS | HEART RATE: 86 BPM | TEMPERATURE: 98.1 F | BODY MASS INDEX: 25.14 KG/M2

## 2018-06-14 DIAGNOSIS — R13.10 DYSPHAGIA, UNSPECIFIED TYPE: ICD-10-CM

## 2018-06-14 DIAGNOSIS — E78.00 HIGH BLOOD CHOLESTEROL: ICD-10-CM

## 2018-06-14 DIAGNOSIS — Z78.0 ASYMPTOMATIC POSTMENOPAUSAL STATUS: ICD-10-CM

## 2018-06-14 DIAGNOSIS — Z23 NEED FOR VACCINATION: ICD-10-CM

## 2018-06-14 DIAGNOSIS — J32.9 OTHER SINUSITIS, UNSPECIFIED CHRONICITY: ICD-10-CM

## 2018-06-14 DIAGNOSIS — Z12.11 SCREEN FOR COLON CANCER: ICD-10-CM

## 2018-06-14 DIAGNOSIS — I10 HTN, GOAL BELOW 140/90: Primary | ICD-10-CM

## 2018-06-14 DIAGNOSIS — Z12.31 VISIT FOR SCREENING MAMMOGRAM: ICD-10-CM

## 2018-06-14 DIAGNOSIS — Z91.89 AT RISK FOR CARDIOVASCULAR EVENT: ICD-10-CM

## 2018-06-14 LAB
ANION GAP SERPL CALCULATED.3IONS-SCNC: 9 MMOL/L (ref 3–14)
BUN SERPL-MCNC: 20 MG/DL (ref 7–30)
CALCIUM SERPL-MCNC: 9.2 MG/DL (ref 8.5–10.1)
CHLORIDE SERPL-SCNC: 104 MMOL/L (ref 94–109)
CHOLEST SERPL-MCNC: 225 MG/DL
CO2 SERPL-SCNC: 28 MMOL/L (ref 20–32)
CREAT SERPL-MCNC: 0.71 MG/DL (ref 0.52–1.04)
GFR SERPL CREATININE-BSD FRML MDRD: 82 ML/MIN/1.7M2
GLUCOSE SERPL-MCNC: 92 MG/DL (ref 70–99)
HDLC SERPL-MCNC: 80 MG/DL
LDLC SERPL CALC-MCNC: 124 MG/DL
NONHDLC SERPL-MCNC: 145 MG/DL
POTASSIUM SERPL-SCNC: 4.3 MMOL/L (ref 3.4–5.3)
SODIUM SERPL-SCNC: 141 MMOL/L (ref 133–144)
TRIGL SERPL-MCNC: 106 MG/DL

## 2018-06-14 PROCEDURE — G0009 ADMIN PNEUMOCOCCAL VACCINE: HCPCS | Performed by: PHYSICIAN ASSISTANT

## 2018-06-14 PROCEDURE — 36415 COLL VENOUS BLD VENIPUNCTURE: CPT | Performed by: PHYSICIAN ASSISTANT

## 2018-06-14 PROCEDURE — 80048 BASIC METABOLIC PNL TOTAL CA: CPT | Performed by: PHYSICIAN ASSISTANT

## 2018-06-14 PROCEDURE — 90472 IMMUNIZATION ADMIN EACH ADD: CPT | Performed by: PHYSICIAN ASSISTANT

## 2018-06-14 PROCEDURE — 99214 OFFICE O/P EST MOD 30 MIN: CPT | Mod: 25 | Performed by: PHYSICIAN ASSISTANT

## 2018-06-14 PROCEDURE — 90715 TDAP VACCINE 7 YRS/> IM: CPT | Performed by: PHYSICIAN ASSISTANT

## 2018-06-14 PROCEDURE — 80061 LIPID PANEL: CPT | Performed by: PHYSICIAN ASSISTANT

## 2018-06-14 PROCEDURE — 90670 PCV13 VACCINE IM: CPT | Performed by: PHYSICIAN ASSISTANT

## 2018-06-14 RX ORDER — FLUTICASONE PROPIONATE 50 MCG
1-2 SPRAY, SUSPENSION (ML) NASAL DAILY
Qty: 1 BOTTLE | Refills: 11 | Status: SHIPPED | OUTPATIENT
Start: 2018-06-14

## 2018-06-14 RX ORDER — AMOXICILLIN 500 MG/1
1000 CAPSULE ORAL 3 TIMES DAILY
Qty: 60 CAPSULE | Refills: 0 | Status: SHIPPED | OUTPATIENT
Start: 2018-06-14 | End: 2018-06-24

## 2018-06-14 RX ORDER — SIMVASTATIN 20 MG
20 TABLET ORAL AT BEDTIME
Qty: 90 TABLET | Refills: 3 | Status: SHIPPED | OUTPATIENT
Start: 2018-06-14 | End: 2019-07-11

## 2018-06-14 RX ORDER — METOPROLOL SUCCINATE 25 MG/1
25 TABLET, EXTENDED RELEASE ORAL DAILY
Qty: 30 TABLET | Refills: 1 | Status: SHIPPED | OUTPATIENT
Start: 2018-06-14 | End: 2018-06-29

## 2018-06-14 NOTE — MR AVS SNAPSHOT
After Visit Summary   6/14/2018    Dora Luna    MRN: [id number]           Patient Information     Date Of Birth          1950        Visit Information        Provider Department      6/14/2018 7:00 AM Nathaniel Walker PA-C [facility]        Today's Diagnoses     HTN, goal below 140/90    -  1    Screening cholesterol level        Dysphagia, unspecified type        Other sinusitis, unspecified chronicity        Screen for colon cancer        Asymptomatic postmenopausal status        Visit for screening mammogram        Need for vaccination          Care Instructions                Heartburn/GERD   What is heartburn?   Heartburn refers to the symptoms you feel when acids in your stomach flow back into the esophagus. (The esophagus is the tube that carries food from your throat to your stomach.) This backward movement of stomach acid is called reflux. The acid can burn and irritate the esophagus, throat, and vocal cords.   Heartburn is a common problem. Despite its name, it has nothing to do with the heart.   When you have heartburn often, you may have a condition called gastroesophageal reflux disease, or GERD.   How does it occur?   At the bottom of the esophagus there is a ring of muscle called a sphincter. It acts like a valve. When you swallow food, the sphincter opens to let the food pass into the stomach. The ring then closes to keep the stomach contents from going back into the esophagus. If the sphincter is weak or too relaxed, stomach acid and food flow backward into the esophagus. Because the esophagus does not have the protective lining that the stomach has, the acid causes pain.   The sphincter muscle sometimes does not work properly if:   You are overweight.   You are pregnant.   You have a hiatal hernia (a condition in which part of the stomach protrudes through the diaphragm into the chest).   You eat too much.   You lie down soon after eating.   You wear  tight clothes that push on your stomach.   Foods that may make heartburn worse are:   foods high in fat   sugar   chocolate   peppermint   onions   citrus foods such as orange juice   tomato-based foods   spicy foods   coffee and other drinks with caffeine, such as tea and tatiana   alcohol.   Heartburn can also be made worse by:   taking certain medicines, such as aspirin   smoking cigarettes.   Anyone can have an attack of heartburn from overeating or eating foods that are high in acid. Most of the time heartburn is mild and lasts for a short time. There is usually not a problem when heartburn occurs just once in a while. You should see your healthcare provider if:   You have heartburn nearly every day for 2 weeks.   The heartburn comes back when the antacid wears off.   Heartburn wakes you up at night.   What are the symptoms?   The main symptom of heartburn is a burning pain in the lower chest, usually close to the bottom of the breastbone. Other symptoms you may have are:   acid or sour taste in your mouth   belching   a feeling of bloating or fullness in the stomach.   These symptoms tend to happen after very large meals and especially with activity such as bending or lifting after meals. The symptoms may be made worse by lying down or by wearing tight clothing.   Heartburn is very common during the last few months of pregnancy. The weight of the baby pushes on the stomach and can cause the sphincter to relax and let acid to flow back into the esophagus.   How is it diagnosed?   Usually heartburn can be diagnosed from your medical history.   If there is any question about the diagnosis, you may have the following tests to check for ulcers or other problems that might cause your symptoms:   barium swallow X-ray study of the esophagus   complete upper GI (gastrointestinal) barium X-ray study of the esophagus, stomach, and upper intestine   endoscopy, a procedure in which a thin flexible tube with a tiny camera is  placed in your mouth and down into your stomach so your provider can see your esophagus and stomach.   How is it treated?   To help reduce the symptoms of heartburn you can:   Try not to put a lot of pressure on the sphincter muscle. Eating light meals and wearing loose clothing will help.   Lose weight if you are overweight.   Take nonprescription antacids (tablets or liquid) after meals and at bedtime.   Raise the head of your bed or use more than one pillow so your head is higher than your stomach. This may allow gravity to help keep food from backing up.   If you find that certain foods or drinks seem to cause your symptoms or make them worse, avoid those foods.   If the simple measures described above do not relieve the symptoms, your healthcare provider may prescribe medicine. The prescription medicines help reduce stomach acid. They also help stomach emptying. A very few people who are not helped with medicines may need surgery.   Get emergency care if the following symptoms occur with the heartburn and do not go away within 15 minutes of treatment for heartburn: shortness of breath; sweating; light-headedness, weakness; or jaw, arm, back, or chest pain.   How long will the effects last?   Heartburn symptoms are usually relieved by treatment in just a few hours or less. If you are having heartburn every day, starting treatment will usually relieve the symptoms in a few days. However, the symptoms may come back from time to time, especially if you gain weight.   Heartburn can sometimes make asthma worse. If you have asthma, preventing or controlling heartburn may help control your asthma symptoms.   How can I help prevent heartburn?   The best prevention is to:   Keep a healthy weight. Lose weight if you are overweight.   Sleep with your head elevated at least 4 to 6 inches. (It's usually most comfortable to put the head of your bed on blocks.)   It may also help if you:   Wait an hour or longer after eating  before you lie down. If you have to lie down after a meal, lie on your left side. Keep your head and shoulders slightly higher than the rest of your body. It's best to not eat for 2 to 3 hours before you go to bed.   Eat smaller, more frequent meals.   Avoid wearing tight clothing or belts.   Don't smoke. Smoking relaxes the sphincter leading to your stomach.   Avoid foods and other things that seem to cause heartburn or make it worse.   Developed by [employer].   Published by [employer].   Last modified: 2009-01-14   Last reviewed: 2008-12-02             Follow-ups after your visit        Additional Services     GASTROENTEROLOGY ADULT REF PROCEDURE ONLY [facility] [phone]       Last Lab Result: Creatinine (mg/dL)       Date                     Value                 02/22/2017               0.67             ----------  Body mass index is 24.98 kg/(m^2).      Patient will be contacted to schedule procedure.     Please be aware that coverage of these services is subject to the terms and limitations of your health insurance plan.  Call member services at your health plan with any benefit or coverage questions.  Any procedures must be performed at a [place] facility OR coordinated by your clinic's referral office.    Please bring the following with you to your appointment:    (1) Any X-Rays, CTs or MRIs which have been performed.  Contact the facility where they were done to arrange for  prior to your scheduled appointment.    (2) List of current medications   (3) This referral request   (4) Any documents/labs given to you for this referral                  Future tests that were ordered for you today     Open Future Orders        Priority Expected Expires Ordered    MA SCREENING DIGITAL BILAT - Future  (s+30) Routine  6/13/2019 6/14/2018            Who to contact     If you have questions or need follow up information about today's clinic visit or your schedule please contact [facility]  [facility] directly at [phone].  Normal or non-critical lab and imaging results will be communicated to you by MyChart, letter or phone within 4 business days after the clinic has received the results. If you do not hear from us within 7 days, please contact the clinic through MyChart or phone. If you have a critical or abnormal lab result, we will notify you by phone as soon as possible.  Submit refill requests through [employer] or call your pharmacy and they will forward the refill request to us. Please allow 3 business days for your refill to be completed.          Additional Information About Your Visit        Care EveryWhere ID     This is your Care EveryWhere ID. This could be used by other organizations to access your [facility] medical records  [id number]        Your Vitals Were     Pulse Temperature Respirations Height Pulse Oximetry BMI (Body Mass Index)    86 98.1  F (36.7  C) (Oral) 16 5' 2\" (1.575 m) 98% 24.98 kg/m2       Blood Pressure from Last 3 Encounters:   06/14/18 (!) 167/96   03/14/17 150/86   02/22/17 164/80    Weight from Last 3 Encounters:   06/14/18 136 lb 9.6 oz (62 kg)   02/22/17 135 lb 12.8 oz (61.6 kg)   01/11/17 138 lb 3.2 oz (62.7 kg)              We Performed the Following     1st  Administration  [69480]     Basic metabolic panel     Each additional admin.  (Right click and add QUANTITY)  [74693]     GASTROENTEROLOGY ADULT REF PROCEDURE ONLY [facility] [phone]     Lipid panel reflex to direct LDL Fasting     Pneumococcal vaccine 13 valent PCV13 IM (Prevnar) [81458]     TDAP VACCINE (ADACEL)          Today's Medication Changes          These changes are accurate as of 6/14/18  7:31 AM.  If you have any questions, ask your nurse or doctor.               Start taking these medicines.        Dose/Directions    amoxicillin 500 MG capsule   Commonly known as:  AMOXIL   Used for:  Other sinusitis, unspecified chronicity   Started by:  Nathaniel Walker PA-C        Dose:  " 1000 mg   Take 2 capsules (1,000 mg) by mouth 3 times daily for 10 days   Quantity:  60 capsule   Refills:  0       fluticasone 50 MCG/ACT spray   Commonly known as:  FLONASE   Used for:  Other sinusitis, unspecified chronicity   Started by:  Nathaniel Walker PA-C        Dose:  1-2 spray   Spray 1-2 sprays into both nostrils daily   Quantity:  1 Bottle   Refills:  11       metoprolol succinate 25 MG 24 hr tablet   Commonly known as:  TOPROL-XL   Used for:  HTN, goal below 140/90, Other sinusitis, unspecified chronicity   Started by:  Nathaniel Walker PA-C        Dose:  25 mg   Take 1 tablet (25 mg) by mouth daily   Quantity:  30 tablet   Refills:  1            Where to get your medicines      These medications were sent to [id number] - [place], [place] - [address] [facility]  [address] [address] [facility] [place] [address]    Hours:  test fax successful 4/5/04 kr Phone:  [phone]     amoxicillin 500 MG capsule    fluticasone 50 MCG/ACT spray    metoprolol succinate 25 MG 24 hr tablet                Primary Care Provider Office Phone # Fax #    [facility] [phone] [address] [facility] [address]        Equal Access to Services     YEMI ORTIZ AH: Hadii isabella ku hadasho Soomaali, waaxda luqadaha, qaybta kaalmada adeegyada, waxay idiin hayscottn mabel lopez. So [facility] [phone].    ATENCIÓN: Si habla español, tiene a boyce disposición servicios gratuitos de asistencia lingüística. ame al [phone].    We comply with applicable federal civil rights laws and [place] laws. We do not discriminate on the basis of race, color, national origin, age, disability, sex, sexual orientation, or gender identity.            Thank you!     Thank you for choosing [facility]  for your care. Our goal is always to provide you with excellent care. Hearing back from our patients is one way we can continue to improve our services. Please take a few  minutes to complete the written survey that you may receive in the mail after your visit with us. Thank you!             Your Updated Medication List - Protect others around you: Learn how to safely use, store and throw away your medicines at www.disposemymeds.org.          This list is accurate as of 6/14/18  7:31 AM.  Always use your most recent med list.                   Brand Name Dispense Instructions for use Diagnosis    amoxicillin 500 MG capsule    AMOXIL    60 capsule    Take 2 capsules (1,000 mg) by mouth 3 times daily for 10 days    Other sinusitis, unspecified chronicity       fluticasone 50 MCG/ACT spray    FLONASE    1 Bottle    Spray 1-2 sprays into both nostrils daily    Other sinusitis, unspecified chronicity       lisinopril 20 MG tablet    PRINIVIL/ZESTRIL    30 tablet    Take 1 tablet (20 mg) by mouth daily    Benign essential hypertension       metoprolol succinate 25 MG 24 hr tablet    TOPROL-XL    30 tablet    Take 1 tablet (25 mg) by mouth daily    HTN, goal below 140/90, Other sinusitis, unspecified chronicity       SIMPLY ALLERGY PO      Take by mouth daily as needed        simvastatin 20 MG tablet    ZOCOR    90 tablet    Take 1 tablet (20 mg) by mouth At Bedtime    At risk for cardiovascular event

## 2018-06-14 NOTE — NURSING NOTE
"Chief Complaint   Patient presents with     Health Maintenance     Tetanus, PVC13, Fall Risk, PHQ2     Hypertension       Initial BP (!) 167/96  Pulse 86  Temp 98.1  F (36.7  C) (Oral)  Resp 16  Ht 5' 2\" (1.575 m)  Wt 136 lb 9.6 oz (62 kg)  SpO2 98%  BMI 24.98 kg/m2 Estimated body mass index is 24.98 kg/(m^2) as calculated from the following:    Height as of this encounter: 5' 2\" (1.575 m).    Weight as of this encounter: 136 lb 9.6 oz (62 kg).  Medication Reconciliation: complete  Donavan Francis CMA    "

## 2018-06-14 NOTE — LETTER
June 14, 2018      Dora Luna  [address] [facility] [address]        Dear ,    We are writing to inform you of your test results.    All of your labs were normal/  near normal for you.   Work on Healthy diet and exercise. Getting heart rate elevated for 30 mins most days of week.     Resulted Orders   Lipid panel reflex to direct LDL Fasting   Result Value Ref Range    Cholesterol 225 (H) <200 mg/dL      Comment:      Desirable:       <200 mg/dl    Triglycerides 106 <150 mg/dL      Comment:      Fasting specimen    HDL Cholesterol 80 >49 mg/dL    LDL Cholesterol Calculated 124 (H) <100 mg/dL      Comment:      Above desirable:  100-129 mg/dl  Borderline High:  130-159 mg/dL  High:             160-189 mg/dL  Very high:       >189 mg/dl      Non HDL Cholesterol 145 (H) <130 mg/dL      Comment:      Above Desirable:  130-159 mg/dl  Borderline high:  160-189 mg/dl  High:             190-219 mg/dl  Very high:       >219 mg/dl     Basic metabolic panel   Result Value Ref Range    Sodium 141 133 - 144 mmol/L    Potassium 4.3 3.4 - 5.3 mmol/L    Chloride 104 94 - 109 mmol/L    Carbon Dioxide 28 20 - 32 mmol/L    Anion Gap 9 3 - 14 mmol/L    Glucose 92 70 - 99 mg/dL      Comment:      Fasting specimen    Urea Nitrogen 20 7 - 30 mg/dL    Creatinine 0.71 0.52 - 1.04 mg/dL    GFR Estimate 82 >60 mL/min/1.7m2      Comment:      Non  GFR Calc    GFR Estimate If Black >90 >60 mL/min/1.7m2      Comment:       GFR Calc    Calcium 9.2 8.5 - 10.1 mg/dL       If you have any questions or concerns, please call the clinic at the number listed above.       Sincerely,        Nathaniel Walker PA-C

## 2018-06-14 NOTE — PATIENT INSTRUCTIONS
Heartburn/GERD   What is heartburn?   Heartburn refers to the symptoms you feel when acids in your stomach flow back into the esophagus. (The esophagus is the tube that carries food from your throat to your stomach.) This backward movement of stomach acid is called reflux. The acid can burn and irritate the esophagus, throat, and vocal cords.   Heartburn is a common problem. Despite its name, it has nothing to do with the heart.   When you have heartburn often, you may have a condition called gastroesophageal reflux disease, or GERD.   How does it occur?   At the bottom of the esophagus there is a ring of muscle called a sphincter. It acts like a valve. When you swallow food, the sphincter opens to let the food pass into the stomach. The ring then closes to keep the stomach contents from going back into the esophagus. If the sphincter is weak or too relaxed, stomach acid and food flow backward into the esophagus. Because the esophagus does not have the protective lining that the stomach has, the acid causes pain.   The sphincter muscle sometimes does not work properly if:   You are overweight.   You are pregnant.   You have a hiatal hernia (a condition in which part of the stomach protrudes through the diaphragm into the chest).   You eat too much.   You lie down soon after eating.   You wear tight clothes that push on your stomach.   Foods that may make heartburn worse are:   foods high in fat   sugar   chocolate   peppermint   onions   citrus foods such as orange juice   tomato-based foods   spicy foods   coffee and other drinks with caffeine, such as tea and tatiana   alcohol.   Heartburn can also be made worse by:   taking certain medicines, such as aspirin   smoking cigarettes.   Anyone can have an attack of heartburn from overeating or eating foods that are high in acid. Most of the time heartburn is mild and lasts for a short time. There is usually not a problem when heartburn occurs just once in a  while. You should see your healthcare provider if:   You have heartburn nearly every day for 2 weeks.   The heartburn comes back when the antacid wears off.   Heartburn wakes you up at night.   What are the symptoms?   The main symptom of heartburn is a burning pain in the lower chest, usually close to the bottom of the breastbone. Other symptoms you may have are:   acid or sour taste in your mouth   belching   a feeling of bloating or fullness in the stomach.   These symptoms tend to happen after very large meals and especially with activity such as bending or lifting after meals. The symptoms may be made worse by lying down or by wearing tight clothing.   Heartburn is very common during the last few months of pregnancy. The weight of the baby pushes on the stomach and can cause the sphincter to relax and let acid to flow back into the esophagus.   How is it diagnosed?   Usually heartburn can be diagnosed from your medical history.   If there is any question about the diagnosis, you may have the following tests to check for ulcers or other problems that might cause your symptoms:   barium swallow X-ray study of the esophagus   complete upper GI (gastrointestinal) barium X-ray study of the esophagus, stomach, and upper intestine   endoscopy, a procedure in which a thin flexible tube with a tiny camera is placed in your mouth and down into your stomach so your provider can see your esophagus and stomach.   How is it treated?   To help reduce the symptoms of heartburn you can:   Try not to put a lot of pressure on the sphincter muscle. Eating light meals and wearing loose clothing will help.   Lose weight if you are overweight.   Take nonprescription antacids (tablets or liquid) after meals and at bedtime.   Raise the head of your bed or use more than one pillow so your head is higher than your stomach. This may allow gravity to help keep food from backing up.   If you find that certain foods or drinks seem to cause  your symptoms or make them worse, avoid those foods.   If the simple measures described above do not relieve the symptoms, your healthcare provider may prescribe medicine. The prescription medicines help reduce stomach acid. They also help stomach emptying. A very few people who are not helped with medicines may need surgery.   Get emergency care if the following symptoms occur with the heartburn and do not go away within 15 minutes of treatment for heartburn: shortness of breath; sweating; light-headedness, weakness; or jaw, arm, back, or chest pain.   How long will the effects last?   Heartburn symptoms are usually relieved by treatment in just a few hours or less. If you are having heartburn every day, starting treatment will usually relieve the symptoms in a few days. However, the symptoms may come back from time to time, especially if you gain weight.   Heartburn can sometimes make asthma worse. If you have asthma, preventing or controlling heartburn may help control your asthma symptoms.   How can I help prevent heartburn?   The best prevention is to:   Keep a healthy weight. Lose weight if you are overweight.   Sleep with your head elevated at least 4 to 6 inches. (It's usually most comfortable to put the head of your bed on blocks.)   It may also help if you:   Wait an hour or longer after eating before you lie down. If you have to lie down after a meal, lie on your left side. Keep your head and shoulders slightly higher than the rest of your body. It's best to not eat for 2 to 3 hours before you go to bed.   Eat smaller, more frequent meals.   Avoid wearing tight clothing or belts.   Don't smoke. Smoking relaxes the sphincter leading to your stomach.   Avoid foods and other things that seem to cause heartburn or make it worse.   Developed by [employer].   Published by [employer].   Last modified: 2009-01-14   Last reviewed: 2008-12-02

## 2018-06-14 NOTE — PROGRESS NOTES
SUBJECTIVE:   Dora Luna is a 68 year old female who presents to clinic today for the following health issues: new patient to me      History of Present Illness     Hypertension:     Outpatient blood pressures:  Are being checked    Blood pressures checked at:  Home    Dietary sodium intake::  Low salt diet  Hypertension comment:  Headache x 2 weeks.  Has been checking blood pressure at home.  137/82, 148/79, 147/81, 139/82    Diet:  Low salt  Frequency of exercise:: limited, gardening.  Barriers to taking medications:  Side effects (Ran out of prescription.  Lisinopril a few days ago- was only on 10 mg. Dry cough noted   Simvistatin 6 weeks ago )  Medication side effects:  Other (cough)    RESPIRATORY SYMPTOMS      Duration: 2 wks    Description  nasal congestion, sore throat, facial pain/pressure and cough    Severity: moderate    Accompanying signs and symptoms: None    History (predisposing factors):  none    Precipitating or alleviating factors: None    Therapies tried and outcome:  none    Also history of indigestion for months. Tx: omeprazole and helps.   Gets the sensation of food getting stuck deep in throat. occ will cause her to vomit.   occ painful.     Problem list and histories reviewed & adjusted, as indicated.  Additional history: as documented      Patient Active Problem List   Diagnosis     Advanced directives, counseling/discussion     HTN, goal below 140/90     Benign essential hypertension     Past Surgical History:   Procedure Laterality Date     APPENDECTOMY         Social History   Substance Use Topics     Smoking status: Never Smoker     Smokeless tobacco: Never Used     Alcohol use No     Family History   Problem Relation Age of Onset     Hypertension Mother          Current Outpatient Prescriptions   Medication Sig Dispense Refill     amoxicillin (AMOXIL) 500 MG capsule Take 2 capsules (1,000 mg) by mouth 3 times daily for 10 days 60 capsule 0     DiphenhydrAMINE HCl (SIMPLY  "ALLERGY PO) Take by mouth daily as needed       fluticasone (FLONASE) 50 MCG/ACT spray Spray 1-2 sprays into both nostrils daily 1 Bottle 11     metoprolol succinate (TOPROL-XL) 25 MG 24 hr tablet Take 1 tablet (25 mg) by mouth daily 30 tablet 1     simvastatin (ZOCOR) 20 MG tablet Take 1 tablet (20 mg) by mouth At Bedtime 90 tablet 3     lisinopril (PRINIVIL/ZESTRIL) 20 MG tablet Take 1 tablet (20 mg) by mouth daily (Patient not taking: Reported on 6/14/2018) 30 tablet 0     [DISCONTINUED] simvastatin (ZOCOR) 20 MG tablet Take 1 tablet (20 mg) by mouth At Bedtime (Patient not taking: Reported on 6/14/2018) 90 tablet 3     No Known Allergies  BP Readings from Last 3 Encounters:   06/14/18 (!) 167/96   03/14/17 150/86   02/22/17 164/80    Wt Readings from Last 3 Encounters:   06/14/18 136 lb 9.6 oz (62 kg)   02/22/17 135 lb 12.8 oz (61.6 kg)   01/11/17 138 lb 3.2 oz (62.7 kg)                  Labs reviewed in EPIC    ROS:  Constitutional, HEENT, cardiovascular, pulmonary, gi and gu systems are negative, except as otherwise noted.    OBJECTIVE:     BP (!) 167/96  Pulse 86  Temp 98.1  F (36.7  C) (Oral)  Resp 16  Ht 5' 2\" (1.575 m)  Wt 136 lb 9.6 oz (62 kg)  SpO2 98%  BMI 24.98 kg/m2  Body mass index is 24.98 kg/(m^2).  GENERAL: healthy, alert and no distress  EYES: Eyes grossly normal to inspection, PERRL and conjunctivae and sclerae normal  HENT: ear canals and TM's normal, nose and mouth without ulcers or lesions  NECK: no adenopathy, no asymmetry, masses, or scars and thyroid normal to palpation  RESP: lungs clear to auscultation - no rales, rhonchi or wheezes  CV: regular rate and rhythm, normal S1 S2, no S3 or S4, no murmur, click or rub, no peripheral edema and peripheral pulses strong  ABDOMEN: soft, nontender, no hepatosplenomegaly, no masses and bowel sounds normal  NEURO: Normal strength and tone, mentation intact and speech normal  PSYCH: mentation appears normal, affect normal/bright    Diagnostic " Test Results:  No results found for this or any previous visit (from the past 24 hour(s)).    ASSESSMENT/PLAN:         ICD-10-CM    1. HTN, goal below 140/90 I10 metoprolol succinate (TOPROL-XL) 25 MG 24 hr tablet     Basic metabolic panel   2. High blood cholesterol E78.00 Lipid panel reflex to direct LDL Fasting   3. Dysphagia, unspecified type R13.10 GASTROENTEROLOGY ADULT REF PROCEDURE ONLY [facility] [phone]   4. Other sinusitis, unspecified chronicity J32.9 amoxicillin (AMOXIL) 500 MG capsule     fluticasone (FLONASE) 50 MCG/ACT spray   5. Screen for colon cancer Z12.11 GASTROENTEROLOGY ADULT REF PROCEDURE ONLY [facility] [phone]   6. Asymptomatic postmenopausal status Z78.0    7. Visit for screening mammogram Z12.31 MA SCREENING DIGITAL BILAT - Future  (s+30)   8. Need for vaccination Z23 Pneumococcal vaccine 13 valent PCV13 IM (Prevnar) [28354]     1st  Administration  [35890]     Each additional admin.  (Right click and add QUANTITY)  [28942]     TDAP VACCINE (ADACEL)   9. At risk for cardiovascular event Z91.89 simvastatin (ZOCOR) 20 MG tablet   1. Will stop lisinopril for possible dry cough side effect. Start metoprolol for blood pressure control and possible help headache. Recheck blood pressure in 2wks.  2. Lab pending. Simvastatin refilled.   3. Get colonoscopy and endoscopy   4. Start amoxicillin and flonase. Probably the cause of her headache.   Work on Healthy diet and exercise. Getting heart rate elevated for 30 mins most days of week.    Nathaniel Walker PA-C  [facility]    The 10-year ASCVD risk score (Trell HOLDER Jr, et al., 2013) is: 17.6%    Values used to calculate the score:      Age: 68 years      Sex: Female      Is Non- : No      Diabetic: No      Tobacco smoker: No      Systolic Blood Pressure: 167 mmHg      Is BP treated: Yes      HDL Cholesterol: 96 mg/dL      Total Cholesterol: 248 mg/dL

## 2018-06-29 ENCOUNTER — ALLIED HEALTH/NURSE VISIT (OUTPATIENT)
Dept: NURSING | Facility: CLINIC | Age: 68
End: 2018-06-29
Payer: COMMERCIAL

## 2018-06-29 VITALS — HEART RATE: 95 BPM | DIASTOLIC BLOOD PRESSURE: 76 MMHG | SYSTOLIC BLOOD PRESSURE: 158 MMHG

## 2018-06-29 DIAGNOSIS — I10 HTN, GOAL BELOW 140/90: ICD-10-CM

## 2018-06-29 RX ORDER — METOPROLOL SUCCINATE 50 MG/1
50 TABLET, EXTENDED RELEASE ORAL DAILY
Qty: 30 TABLET | Refills: 1 | Status: SHIPPED | OUTPATIENT
Start: 2018-06-29 | End: 2018-07-12

## 2018-06-29 NOTE — PROGRESS NOTES
Dora Luna is a 68 year old patient who comes in today for a Blood Pressure check.  Initial BP:  /80  Pulse 95     95  Disposition: BP elevated.  Triage RN notified, patient asked to wait    Jayna Giles MA      BP Readings from Last 3 Encounters:   06/29/18 186/80   06/14/18 (!) 167/96   03/14/17 150/86       HOME READINGS SENT TO BE SCANNED INTO CHART.  Jayna Giles MA

## 2018-06-29 NOTE — Clinical Note
Per protocol, will route encounter to be cosigned by provider for Verbal Orders.  JUAN Macedo to Nathaniel Walker PA-C.

## 2018-06-29 NOTE — PROGRESS NOTES
I have discussed this patient's issue with the RN involved and we have come up with this plan.  Julio C Marion MD

## 2018-06-29 NOTE — MR AVS SNAPSHOT
After Visit Summary   6/29/2018    Dora Luna    MRN: [id number]           Patient Information     Date Of Birth          1950        Visit Information        Provider Department      6/29/2018 10:15 AM AN ANCILLARY [facility]        Today's Diagnoses     HTN, goal below 140/90          Care Instructions    Follow up with Nathaniel Walker PA-C 7/12/18          Follow-ups after your visit        Follow-up notes from your care team     Return in about 2 weeks (around 7/13/2018) for BP Recheck.      Your next 10 appointments already scheduled     Jul 12, 2018  7:20 AM CDT   SHORT with Nathaniel Walker PA-C   [facility] ([facility]    [address] [facility] [address] [phone]              Who to contact     If you have questions or need follow up information about today's clinic visit or your schedule please contact [facility] directly at [phone].  Normal or non-critical lab and imaging results will be communicated to you by MyChart, letter or phone within 4 business days after the clinic has received the results. If you do not hear from us within 7 days, please contact the clinic through [employer] or phone. If you have a critical or abnormal lab result, we will notify you by phone as soon as possible.  Submit refill requests through [employer] or call your pharmacy and they will forward the refill request to us. Please allow 3 business days for your refill to be completed.          Additional Information About Your Visit        Care EveryWhere ID     This is your Care EveryWhere ID. This could be used by other organizations to access your [place] medical records  [id number]        Your Vitals Were     Pulse                   95            Blood Pressure from Last 3 Encounters:   06/29/18 158/76   06/14/18 (!) 167/96   03/14/17 150/86    Weight from Last 3 Encounters:   06/14/18 136 lb 9.6 oz (62 kg)   02/22/17 135  lb 12.8 oz (61.6 kg)   01/11/17 138 lb 3.2 oz (62.7 kg)              Today, you had the following     No orders found for display         Today's Medication Changes          These changes are accurate as of 6/29/18 11:18 AM.  If you have any questions, ask your nurse or doctor.               These medicines have changed or have updated prescriptions.        Dose/Directions    metoprolol succinate 50 MG 24 hr tablet   Commonly known as:  TOPROL-XL   This may have changed:    - medication strength  - how much to take   Used for:  HTN, goal below 140/90        Dose:  50 mg   Take 1 tablet (50 mg) by mouth daily   Quantity:  30 tablet   Refills:  1         Stop taking these medicines if you haven't already. Please contact your care team if you have questions.     lisinopril 20 MG tablet   Commonly known as:  PRINIVIL/ZESTRIL                Where to get your medicines      These medications were sent to [id number] - [place] [facility], [place] - [address]  [address], [facility] [address]    Hours:  test fax successful 4/5/04  Phone:  [phone]     metoprolol succinate 50 MG 24 hr tablet                Primary Care Provider Office Phone # Fax #    [facility] [phone] [address] [address] [address]        Equal Access to Services     YEMI ORTIZ AH: Hadii isabella ku hadasho Soomaali, waaxda luqadaha, qaybta kaalmada adeegyada, perez simmons hayluis eduardo carrera . So [facility] [phone].    ATENCIÓN: Si habla español, tiene a boyce disposición servicios gratuitos de asistencia lingüística. Llame al [phone].    We comply with applicable federal civil rights laws and [place] laws. We do not discriminate on the basis of race, color, national origin, age, disability, sex, sexual orientation, or gender identity.            Thank you!     Thank you for choosing [facility]  for your care. Our goal is always to provide you with excellent care.  Hearing back from our patients is one way we can continue to improve our services. Please take a few minutes to complete the written survey that you may receive in the mail after your visit with us. Thank you!             Your Updated Medication List - Protect others around you: Learn how to safely use, store and throw away your medicines at www.disposemymeds.org.          This list is accurate as of 6/29/18 11:18 AM.  Always use your most recent med list.                   Brand Name Dispense Instructions for use Diagnosis    fluticasone 50 MCG/ACT spray    FLONASE    1 Bottle    Spray 1-2 sprays into both nostrils daily    Other sinusitis, unspecified chronicity       metoprolol succinate 50 MG 24 hr tablet    TOPROL-XL    30 tablet    Take 1 tablet (50 mg) by mouth daily    HTN, goal below 140/90       SIMPLY ALLERGY PO      Take by mouth daily as needed        simvastatin 20 MG tablet    ZOCOR    90 tablet    Take 1 tablet (20 mg) by mouth At Bedtime    At risk for cardiovascular event

## 2018-06-29 NOTE — PROGRESS NOTES
Repeat blood pressure 158/76.  Patient was previously taking Lisinopril 20 mg d/c'd due to ineffectiveness and side effects.  Has been on Metoprolol x 2 weeks.   BP Readings from Last 6 Encounters:   06/29/18 158/76   06/14/18 (!) 167/96   03/14/17 150/86   02/22/17 164/80   01/11/17 149/80   01/04/17 194/90     Home blood pressures are better. 157-156/66-80.  Blood pressure cuff has not been validated at clinic.    HR 95.    Denies lightheaded, dizziness, headaches, shortness of breath, dyspnea on exertion, chest pain.     Information above is reviewed with Dr Marion Verbal Orders, increase Metoprolol 50 mg daily.    Warning symptoms that would need to be seen promptly at Emergency Room or require calling 911 include,  Chest pain, radiating pain, shortness of breath, painful breathing with exertion, nausea, sweating, accelerated heart rate, palpitations, worst headache of your life, calf pain/swelling/redness.    Reviewed UC/FNA functionality and availability.     Follow up with Nathaniel Walker PA-C 7/12/18.  The patient/parent agrees with the plan and verbalized good understanding.    Per protocol, will route encounter to be cosigned by provider for Verbal Orders.  Deana Ruiz RN

## 2018-07-11 NOTE — PROGRESS NOTES
SUBJECTIVE:                                                    Dora Luna is a 68 year old female who presents to clinic today for the following health issues:    Hypertension Follow-up      Outpatient blood pressures are being checked at home.  Results are elevated. 145/80, 158/87, 166/79, 150/83    Low Salt Diet: low salt      Amount of exercise or physical activity: None    Problems taking medications regularly: No    Medication side effects: none    Diet: low salt     States headache's are gone.   No chest pain or short of breath. No fatigue or dizziness.     Problem list and histories reviewed & adjusted, as indicated.  Additional history: as documented    Patient Active Problem List   Diagnosis     Advanced directives, counseling/discussion     HTN, goal below 140/90     Benign essential hypertension     Past Surgical History:   Procedure Laterality Date     APPENDECTOMY         Social History   Substance Use Topics     Smoking status: Never Smoker     Smokeless tobacco: Never Used     Alcohol use No     Family History   Problem Relation Age of Onset     Hypertension Mother          Current Outpatient Prescriptions   Medication Sig Dispense Refill     DiphenhydrAMINE HCl (SIMPLY ALLERGY PO) Take by mouth daily as needed       fluticasone (FLONASE) 50 MCG/ACT spray Spray 1-2 sprays into both nostrils daily 1 Bottle 11     metoprolol succinate (TOPROL-XL) 25 MG 24 hr tablet Take 1 tablet (25 mg) by mouth daily 90 tablet 1     metoprolol succinate (TOPROL-XL) 50 MG 24 hr tablet Take 1 tablet (50 mg) by mouth daily 90 tablet 1     simvastatin (ZOCOR) 20 MG tablet Take 1 tablet (20 mg) by mouth At Bedtime 90 tablet 3     [DISCONTINUED] metoprolol succinate (TOPROL-XL) 50 MG 24 hr tablet Take 1 tablet (50 mg) by mouth daily 30 tablet 1     No Known Allergies  BP Readings from Last 3 Encounters:   07/12/18 144/70   06/29/18 158/76   06/14/18 (!) 167/96    Wt Readings from Last 3 Encounters:   07/12/18 136 lb  "(61.7 kg)   06/14/18 136 lb 9.6 oz (62 kg)   02/22/17 135 lb 12.8 oz (61.6 kg)                  Labs reviewed in EPIC    ROS:  Constitutional, HEENT, cardiovascular, pulmonary, gi and gu systems are negative, except as otherwise noted.    OBJECTIVE:     /70  Pulse 72  Temp 97.8  F (36.6  C) (Oral)  Resp 16  Ht 5' 2\" (1.575 m)  Wt 136 lb (61.7 kg)  SpO2 97%  BMI 24.87 kg/m2  Body mass index is 24.87 kg/(m^2).  GENERAL: healthy, alert and no distress  NECK: no adenopathy, no asymmetry, masses, or scars and thyroid normal to palpation  RESP: lungs clear to auscultation - no rales, rhonchi or wheezes  CV: regular rate and rhythm, normal S1 S2, no S3 or S4, no murmur, click or rub, no peripheral edema and peripheral pulses strong  MS: no gross musculoskeletal defects noted, no edema    Diagnostic Test Results:  none     ASSESSMENT/PLAN:         ICD-10-CM    1. HTN, goal below 140/90 I10 metoprolol succinate (TOPROL-XL) 25 MG 24 hr tablet     metoprolol succinate (TOPROL-XL) 50 MG 24 hr tablet   2. Screen for colon cancer Z12.11 GASTROENTEROLOGY ADULT REF PROCEDURE ONLY   3. Asymptomatic postmenopausal status Z78.0 DEXA HIP/PELVIS/SPINE - Future   4. Visit for screening mammogram Z12.31    1. Will increase metoprolol to 75mg daily. Recheck blood pressure in pharmacy in 2-4 wks. warning signs discussed. side effects discussed  If stable recheck 6 months.     Nathaniel Walker PA-C  [facility] AND[facility]  The 10-year ASCVD risk score (Thorsby GLORY Jr, et al., 2013) is: 13.5%    Values used to calculate the score:      Age: 68 years      Sex: Female      Is Non- : No      Diabetic: No      Tobacco smoker: No      Systolic Blood Pressure: 144 mmHg      Is BP treated: Yes      HDL Cholesterol: 80 mg/dL      Total Cholesterol: 225 mg/dL   "

## 2018-07-12 ENCOUNTER — OFFICE VISIT (OUTPATIENT)
Dept: FAMILY MEDICINE | Facility: CLINIC | Age: 68
End: 2018-07-12
Payer: COMMERCIAL

## 2018-07-12 VITALS
SYSTOLIC BLOOD PRESSURE: 144 MMHG | WEIGHT: 136 LBS | DIASTOLIC BLOOD PRESSURE: 70 MMHG | BODY MASS INDEX: 25.03 KG/M2 | HEART RATE: 72 BPM | OXYGEN SATURATION: 97 % | TEMPERATURE: 97.8 F | HEIGHT: 62 IN | RESPIRATION RATE: 16 BRPM

## 2018-07-12 DIAGNOSIS — Z12.31 VISIT FOR SCREENING MAMMOGRAM: ICD-10-CM

## 2018-07-12 DIAGNOSIS — Z78.0 ASYMPTOMATIC POSTMENOPAUSAL STATUS: ICD-10-CM

## 2018-07-12 DIAGNOSIS — Z12.11 SCREEN FOR COLON CANCER: ICD-10-CM

## 2018-07-12 DIAGNOSIS — I10 HTN, GOAL BELOW 140/90: ICD-10-CM

## 2018-07-12 PROCEDURE — 99213 OFFICE O/P EST LOW 20 MIN: CPT | Performed by: PHYSICIAN ASSISTANT

## 2018-07-12 RX ORDER — METOPROLOL SUCCINATE 25 MG/1
25 TABLET, EXTENDED RELEASE ORAL DAILY
Qty: 90 TABLET | Refills: 1 | Status: SHIPPED | OUTPATIENT
Start: 2018-07-12 | End: 2019-01-23 | Stop reason: DRUGHIGH

## 2018-07-12 RX ORDER — METOPROLOL SUCCINATE 50 MG/1
50 TABLET, EXTENDED RELEASE ORAL DAILY
Qty: 90 TABLET | Refills: 1 | Status: SHIPPED | OUTPATIENT
Start: 2018-07-12 | End: 2019-01-23 | Stop reason: DRUGHIGH

## 2018-07-12 NOTE — NURSING NOTE
"Chief Complaint   Patient presents with     Hypertension       Initial /88  Pulse 72  Temp 97.8  F (36.6  C) (Oral)  Resp 16  Ht 5' 2\" (1.575 m)  Wt 136 lb (61.7 kg)  SpO2 97%  BMI 24.87 kg/m2 Estimated body mass index is 24.87 kg/(m^2) as calculated from the following:    Height as of this encounter: 5' 2\" (1.575 m).    Weight as of this encounter: 136 lb (61.7 kg).  Medication Reconciliation: complete  Nina Cuello CMA    "

## 2018-07-12 NOTE — MR AVS SNAPSHOT
After Visit Summary   7/12/2018    Dora Luna    MRN: [id number]           Patient Information     Date Of Birth          1950        Visit Information        Provider Department      7/12/2018 7:20 AM Nathaniel Walker PA-C [facility]        Today's Diagnoses     Screen for colon cancer        Asymptomatic postmenopausal status        Visit for screening mammogram        HTN, goal below 140/90           Follow-ups after your visit        Additional Services     GASTROENTEROLOGY ADULT REF PROCEDURE ONLY       Last Lab Result: Creatinine (mg/dL)       Date                     Value                 06/14/2018               0.71             ----------  Body mass index is 24.87 kg/(m^2).      Patient will be contacted to schedule procedure.     Please be aware that coverage of these services is subject to the terms and limitations of your health insurance plan.  Call member services at your health plan with any benefit or coverage questions.  Any procedures must be performed at a [facility] facility OR coordinated by your clinic's referral office.    Please bring the following with you to your appointment:    (1) Any X-Rays, CTs or MRIs which have been performed.  Contact the facility where they were done to arrange for  prior to your scheduled appointment.    (2) List of current medications   (3) This referral request   (4) Any documents/labs given to you for this referral                  Future tests that were ordered for you today     Open Future Orders        Priority Expected Expires Ordered    DEXA HIP/PELVIS/SPINE - Future Routine  7/11/2019 7/12/2018            Who to contact     If you have questions or need follow up information about today's clinic visit or your schedule please contact [facility] directly at [phone].  Normal or non-critical lab and imaging results will be communicated to you by MyChart, letter or phone within 4 business  "days after the clinic has received the results. If you do not hear from us within 7 days, please contact the clinic through [employer] or phone. If you have a critical or abnormal lab result, we will notify you by phone as soon as possible.  Submit refill requests through [employer] or call your pharmacy and they will forward the refill request to us. Please allow 3 business days for your refill to be completed.          Additional Information About Your Visit        Care EveryWhere ID     This is your Care EveryWhere ID. This could be used by other organizations to access your [facility] medical records  [id number]        Your Vitals Were     Pulse Temperature Respirations Height Pulse Oximetry BMI (Body Mass Index)    72 97.8  F (36.6  C) (Oral) 16 5' 2\" (1.575 m) 97% 24.87 kg/m2       Blood Pressure from Last 3 Encounters:   07/12/18 144/70   06/29/18 158/76   06/14/18 (!) 167/96    Weight from Last 3 Encounters:   07/12/18 136 lb (61.7 kg)   06/14/18 136 lb 9.6 oz (62 kg)   02/22/17 135 lb 12.8 oz (61.6 kg)              We Performed the Following     GASTROENTEROLOGY ADULT REF PROCEDURE ONLY          Today's Medication Changes          These changes are accurate as of 7/12/18  7:47 AM.  If you have any questions, ask your nurse or doctor.               These medicines have changed or have updated prescriptions.        Dose/Directions    * metoprolol succinate 25 MG 24 hr tablet   Commonly known as:  TOPROL-XL   This may have changed:  You were already taking a medication with the same name, and this prescription was added. Make sure you understand how and when to take each.   Used for:  HTN, goal below 140/90   Changed by:  Nathaniel Walker PA-C        Dose:  25 mg   Take 1 tablet (25 mg) by mouth daily   Quantity:  90 tablet   Refills:  1       * metoprolol succinate 50 MG 24 hr tablet   Commonly known as:  TOPROL-XL   This may have changed:  Another medication with the same name was added. Make sure you " understand how and when to take each.   Used for:  HTN, goal below 140/90   Changed by:  Nathaniel Walker PA-C        Dose:  50 mg   Take 1 tablet (50 mg) by mouth daily   Quantity:  90 tablet   Refills:  1       * Notice:  This list has 2 medication(s) that are the same as other medications prescribed for you. Read the directions carefully, and ask your doctor or other care provider to review them with you.         Where to get your medicines      These medications were sent to [facility] [address], [address]  [address], [place] [address], [facility] [address]     Phone:  [phone]     metoprolol succinate 25 MG 24 hr tablet    metoprolol succinate 50 MG 24 hr tablet                Primary Care Provider Office Phone # Fax #    [facility] [phone] [address] [address] [facility] [address]        Equal Access to Services     YEMI ORTIZ : Hadii isabella ku hadasho Soomaali, waaxda luqadaha, qaybta kaalmada adeegyada, waxay idiin hayscottn mabel carrera . So [facility] [phone].    ATENCIÓN: Si habla español, tiene a boyce disposición servicios gratuitos de asistencia lingüística. Llame al [phone].    We comply with applicable federal civil rights laws and [place] laws. We do not discriminate on the basis of race, color, national origin, age, disability, sex, sexual orientation, or gender identity.            Thank you!     Thank you for choosing [facility]  for your care. Our goal is always to provide you with excellent care. Hearing back from our patients is one way we can continue to improve our services. Please take a few minutes to complete the written survey that you may receive in the mail after your visit with us. Thank you!             Your Updated Medication List - Protect others around you: Learn how to safely use, store and throw away your medicines at www.disposemymeds.org.          This list is accurate as of  7/12/18  7:47 AM.  Always use your most recent med list.                   Brand Name Dispense Instructions for use Diagnosis    fluticasone 50 MCG/ACT spray    FLONASE    1 Bottle    Spray 1-2 sprays into both nostrils daily    Other sinusitis, unspecified chronicity       * metoprolol succinate 25 MG 24 hr tablet    TOPROL-XL    90 tablet    Take 1 tablet (25 mg) by mouth daily    HTN, goal below 140/90       * metoprolol succinate 50 MG 24 hr tablet    TOPROL-XL    90 tablet    Take 1 tablet (50 mg) by mouth daily    HTN, goal below 140/90       SIMPLY ALLERGY PO      Take by mouth daily as needed        simvastatin 20 MG tablet    ZOCOR    90 tablet    Take 1 tablet (20 mg) by mouth At Bedtime    At risk for cardiovascular event       * Notice:  This list has 2 medication(s) that are the same as other medications prescribed for you. Read the directions carefully, and ask your doctor or other care provider to review them with you.

## 2018-08-06 ENCOUNTER — TELEPHONE (OUTPATIENT)
Dept: FAMILY MEDICINE | Facility: CLINIC | Age: 68
End: 2018-08-06

## 2018-08-06 NOTE — LETTER
Dora Luna  [address] [facility] [address]          August 6, 2018          Dear Dora Luna      Our records indicate that you have not scheduled for a(n)Mammogram and Colonoscopy which is recommended by your health care team. Monitoring and managing your preventative and chronic health conditions are very important to us.       If you have received your health care elsewhere, please provide us with that information so it can be documented in your chart.    Please call [phone] or message us through your [employer] account to schedule an appointment or provide information for your chart.     We look forward to seeing you and working with you on your health care needs.     Sincerely,   Nathaniel Walker PA-C/JOSE CMA          *If you have already scheduled an appointment, please disregard this reminder

## 2018-08-06 NOTE — TELEPHONE ENCOUNTER
Panel Management Review      Patient has the following on her problem list:     Hypertension   Last three blood pressure readings:  BP Readings from Last 3 Encounters:   07/12/18 144/70   06/29/18 158/76   06/14/18 (!) 167/96     Blood pressure: Passed    HTN Guidelines:  Age 18-59 BP range:  Less than 140/90  Age 60-85 with Diabetes:  Less than 140/90  Age 60-85 without Diabetes:  less than 150/90      Composite cancer screening  Chart review shows that this patient is due/due soon for the following Mammogram and Colonoscopy  Summary:    Patient is due/failing the following:   COLONOSCOPY and MAMMOGRAM    Action needed:   Patient needs referral/order:  colonoscopy    Type of outreach:    Sent letter.    Questions for provider review:    None                                                                                                                                    SOLA Caraballo       Chart routed to none .

## 2019-01-22 NOTE — PROGRESS NOTES
"SUBJECTIVE:                                                    Dora Luna is a 68 year old female who presents to clinic today for the following health issues:    Hypertension Follow-up      Outpatient blood pressures are being checked at home.  Results are numbers are all over per pt .    Low Salt Diet: low salt    Problem list and histories reviewed & adjusted, as indicated.  Additional history: as documented    Patient Active Problem List   Diagnosis     Advanced directives, counseling/discussion     HTN, goal below 140/90     Benign essential hypertension     Past Surgical History:   Procedure Laterality Date     APPENDECTOMY         Social History     Tobacco Use     Smoking status: Never Smoker     Smokeless tobacco: Never Used   Substance Use Topics     Alcohol use: No     Family History   Problem Relation Age of Onset     Hypertension Mother          Current Outpatient Medications   Medication Sig Dispense Refill     metoprolol succinate ER (TOPROL-XL) 100 MG 24 hr tablet Take 1 tablet (100 mg) by mouth daily 90 tablet 1     simvastatin (ZOCOR) 20 MG tablet Take 1 tablet (20 mg) by mouth At Bedtime 90 tablet 3     DiphenhydrAMINE HCl (SIMPLY ALLERGY PO) Take by mouth daily as needed       fluticasone (FLONASE) 50 MCG/ACT spray Spray 1-2 sprays into both nostrils daily (Patient not taking: Reported on 1/23/2019) 1 Bottle 11     Allergies   Allergen Reactions     Norvasc [Amlodipine] Other (See Comments)     Chest pain     BP Readings from Last 3 Encounters:   01/23/19 152/82   07/12/18 144/70   06/29/18 158/76    Wt Readings from Last 3 Encounters:   01/23/19 61.7 kg (136 lb)   07/12/18 61.7 kg (136 lb)   06/14/18 62 kg (136 lb 9.6 oz)                  Labs reviewed in EPIC    ROS:  Constitutional, HEENT, cardiovascular, pulmonary, gi and gu systems are negative, except as otherwise noted.    OBJECTIVE:     /82   Pulse 68   Temp 98  F (36.7  C) (Oral)   Resp 14   Ht 1.575 m (5' 2\")   Wt 61.7 " kg (136 lb)   SpO2 96%   BMI 24.87 kg/m    Body mass index is 24.87 kg/m .  GENERAL: healthy, alert and no distress  RESP: lungs clear to auscultation - no rales, rhonchi or wheezes  CV: regular rate and rhythm, normal S1 S2, no S3 or S4, no murmur, click or rub, no peripheral edema and peripheral pulses strong  ABDOMEN: soft, nontender, no hepatosplenomegaly, no masses and bowel sounds normal  MS: no gross musculoskeletal defects noted, no edema    Diagnostic Test Results:  none     ASSESSMENT/PLAN:       ICD-10-CM    1. HTN, goal below 140/90 I10 metoprolol succinate ER (TOPROL-XL) 100 MG 24 hr tablet   2. Screen for colon cancer Z12.11    increase metoprolol to 100mg daily   warning signs discussed. side effects discussed  Recheck blood pressure in 2 wks with pharmacy. If stable recheck in clinic in 6 months.     Nathaniel Walker PA-C  [facility] AND[facility]    The 10-year ASCVD risk score (Trelljohnny HOLDER Jr., et al., 2013) is: 15%    Values used to calculate the score:      Age: 68 years      Sex: Female      Is Non- : No      Diabetic: No      Tobacco smoker: No      Systolic Blood Pressure: 152 mmHg      Is BP treated: Yes      HDL Cholesterol: 80 mg/dL      Total Cholesterol: 225 mg/dL     Answers for HPI/ROS submitted by the patient on 1/23/2019   Chronic problems general questions HPI Form  Outpatient blood pressures: are being checked

## 2019-01-23 ENCOUNTER — OFFICE VISIT (OUTPATIENT)
Dept: FAMILY MEDICINE | Facility: CLINIC | Age: 69
End: 2019-01-23
Payer: COMMERCIAL

## 2019-01-23 VITALS
TEMPERATURE: 98 F | HEIGHT: 62 IN | RESPIRATION RATE: 14 BRPM | BODY MASS INDEX: 25.03 KG/M2 | SYSTOLIC BLOOD PRESSURE: 152 MMHG | DIASTOLIC BLOOD PRESSURE: 82 MMHG | WEIGHT: 136 LBS | OXYGEN SATURATION: 96 % | HEART RATE: 68 BPM

## 2019-01-23 DIAGNOSIS — Z12.11 SCREEN FOR COLON CANCER: ICD-10-CM

## 2019-01-23 DIAGNOSIS — I10 HTN, GOAL BELOW 140/90: Primary | ICD-10-CM

## 2019-01-23 PROBLEM — Z12.31 VISIT FOR SCREENING MAMMOGRAM: Status: ACTIVE | Noted: 2019-01-23

## 2019-01-23 PROBLEM — Z12.31 VISIT FOR SCREENING MAMMOGRAM: Status: RESOLVED | Noted: 2019-01-23 | Resolved: 2019-01-23

## 2019-01-23 PROCEDURE — 99213 OFFICE O/P EST LOW 20 MIN: CPT | Performed by: PHYSICIAN ASSISTANT

## 2019-01-23 RX ORDER — METOPROLOL SUCCINATE 100 MG/1
100 TABLET, EXTENDED RELEASE ORAL DAILY
Qty: 90 TABLET | Refills: 1 | Status: SHIPPED | OUTPATIENT
Start: 2019-01-23 | End: 2019-07-11

## 2019-01-23 ASSESSMENT — MIFFLIN-ST. JEOR: SCORE: 1095.14

## 2019-03-19 ENCOUNTER — TRANSFERRED RECORDS (OUTPATIENT)
Dept: HEALTH INFORMATION MANAGEMENT | Facility: CLINIC | Age: 69
End: 2019-03-19

## 2019-03-19 LAB — COLOGUARD-ABSTRACT: NEGATIVE

## 2019-03-28 ENCOUNTER — TELEPHONE (OUTPATIENT)
Dept: FAMILY MEDICINE | Facility: CLINIC | Age: 69
End: 2019-03-28

## 2019-03-28 NOTE — LETTER
March 28, 2019      Dora Luna  [address] [facility] [address]      Dear Dora,    The result of your recent Cologuard testing was negative. A negative result means that Cologuard did not detect significant levels of DNA and/or hemoglobin biomarkers in the stool which are associated with colon cancer or precancer.    Thank you for completing your screening, your next screening should be completed in 3 years.      If you have any questions or concerns, please contact your care team at [phone].     Sincerely,    Nathaniel Walker PA-C/marycarmen

## 2019-03-28 NOTE — TELEPHONE ENCOUNTER
Patient has a negative Cologuard result. Paper copy has been placed in basket to review. When complete, sign result then place in TC basket and rout back to [facility].  Kiesha Magdaleno, TC

## 2019-05-23 ENCOUNTER — TELEPHONE (OUTPATIENT)
Dept: FAMILY MEDICINE | Facility: CLINIC | Age: 69
End: 2019-05-23

## 2019-05-23 NOTE — LETTER
[facility]  [address] [facility] [address]  Phone: [phone]    05/23/19    Dora Luna  [address] [facility] [address]      Dora,      Our records indicate that you have not came in for a blood pressure check with the pharmacy which was recommended by your health care team.     Monitoring and managing your preventative and chronic health conditions are very important to us.     If you have received your health care elsewhere, please call the clinic so the information can be documented in your chart.    Please call [phone] or message us through your [employer] account to schedule an appointment or provide information for your chart.     I look forward to seeing you and working with you on your health care needs.     Sincerely,       [facility]/rb              *If you have already scheduled an appointment, please disregard this reminder

## 2019-07-11 ENCOUNTER — OFFICE VISIT (OUTPATIENT)
Dept: FAMILY MEDICINE | Facility: CLINIC | Age: 69
End: 2019-07-11
Payer: COMMERCIAL

## 2019-07-11 VITALS
TEMPERATURE: 98.1 F | WEIGHT: 136 LBS | HEIGHT: 62 IN | SYSTOLIC BLOOD PRESSURE: 154 MMHG | OXYGEN SATURATION: 97 % | DIASTOLIC BLOOD PRESSURE: 82 MMHG | HEART RATE: 64 BPM | RESPIRATION RATE: 16 BRPM | BODY MASS INDEX: 25.03 KG/M2

## 2019-07-11 DIAGNOSIS — M70.62 TROCHANTERIC BURSITIS OF LEFT HIP: ICD-10-CM

## 2019-07-11 DIAGNOSIS — Z91.89 AT RISK FOR CARDIOVASCULAR EVENT: ICD-10-CM

## 2019-07-11 DIAGNOSIS — I10 HTN, GOAL BELOW 140/90: Primary | ICD-10-CM

## 2019-07-11 DIAGNOSIS — Z12.31 ENCOUNTER FOR SCREENING MAMMOGRAM FOR BREAST CANCER: ICD-10-CM

## 2019-07-11 LAB
ANION GAP SERPL CALCULATED.3IONS-SCNC: 8 MMOL/L (ref 3–14)
BUN SERPL-MCNC: 16 MG/DL (ref 7–30)
CALCIUM SERPL-MCNC: 9.6 MG/DL (ref 8.5–10.1)
CHLORIDE SERPL-SCNC: 105 MMOL/L (ref 94–109)
CHOLEST SERPL-MCNC: 188 MG/DL
CO2 SERPL-SCNC: 26 MMOL/L (ref 20–32)
CREAT SERPL-MCNC: 0.73 MG/DL (ref 0.52–1.04)
GFR SERPL CREATININE-BSD FRML MDRD: 83 ML/MIN/{1.73_M2}
GLUCOSE SERPL-MCNC: 99 MG/DL (ref 70–99)
HDLC SERPL-MCNC: 90 MG/DL
LDLC SERPL CALC-MCNC: 81 MG/DL
NONHDLC SERPL-MCNC: 98 MG/DL
POTASSIUM SERPL-SCNC: 4.2 MMOL/L (ref 3.4–5.3)
SODIUM SERPL-SCNC: 139 MMOL/L (ref 133–144)
TRIGL SERPL-MCNC: 87 MG/DL

## 2019-07-11 PROCEDURE — 80048 BASIC METABOLIC PNL TOTAL CA: CPT | Performed by: PHYSICIAN ASSISTANT

## 2019-07-11 PROCEDURE — 80061 LIPID PANEL: CPT | Performed by: PHYSICIAN ASSISTANT

## 2019-07-11 PROCEDURE — 36415 COLL VENOUS BLD VENIPUNCTURE: CPT | Performed by: PHYSICIAN ASSISTANT

## 2019-07-11 PROCEDURE — 99214 OFFICE O/P EST MOD 30 MIN: CPT | Performed by: PHYSICIAN ASSISTANT

## 2019-07-11 RX ORDER — METOPROLOL SUCCINATE 100 MG/1
100 TABLET, EXTENDED RELEASE ORAL DAILY
Qty: 90 TABLET | Refills: 1 | Status: SHIPPED | OUTPATIENT
Start: 2019-07-11 | End: 2020-01-16

## 2019-07-11 RX ORDER — SIMVASTATIN 20 MG
20 TABLET ORAL AT BEDTIME
Qty: 90 TABLET | Refills: 3 | Status: SHIPPED | OUTPATIENT
Start: 2019-07-11 | End: 2020-07-14

## 2019-07-11 RX ORDER — LISINOPRIL 10 MG/1
10 TABLET ORAL DAILY
Qty: 30 TABLET | Refills: 0 | Status: SHIPPED | OUTPATIENT
Start: 2019-07-11 | End: 2019-07-23

## 2019-07-11 ASSESSMENT — MIFFLIN-ST. JEOR: SCORE: 1090.14

## 2019-07-11 NOTE — LETTER
July 11, 2019      Dora Luna  [address] [facility] [address]              Ms. Luna,     All of your labs were normal for you.            Resulted Orders   Lipid panel reflex to direct LDL Fasting   Result Value Ref Range    Cholesterol 188 <200 mg/dL    Triglycerides 87 <150 mg/dL      Comment:      Fasting specimen    HDL Cholesterol 90 >49 mg/dL    LDL Cholesterol Calculated 81 <100 mg/dL      Comment:      Desirable:       <100 mg/dl    Non HDL Cholesterol 98 <130 mg/dL   Basic metabolic panel   Result Value Ref Range    Sodium 139 133 - 144 mmol/L    Potassium 4.2 3.4 - 5.3 mmol/L    Chloride 105 94 - 109 mmol/L    Carbon Dioxide 26 20 - 32 mmol/L    Anion Gap 8 3 - 14 mmol/L    Glucose 99 70 - 99 mg/dL      Comment:      Fasting specimen    Urea Nitrogen 16 7 - 30 mg/dL    Creatinine 0.73 0.52 - 1.04 mg/dL    GFR Estimate 83 >60 mL/min/[1.73_m2]      Comment:      Non  GFR Calc  Starting 12/18/2018, serum creatinine based estimated GFR (eGFR) will be   calculated using the Chronic Kidney Disease Epidemiology Collaboration   (CKD-EPI) equation.      GFR Estimate If Black >90 >60 mL/min/[1.73_m2]      Comment:       GFR Calc  Starting 12/18/2018, serum creatinine based estimated GFR (eGFR) will be   calculated using the Chronic Kidney Disease Epidemiology Collaboration   (CKD-EPI) equation.      Calcium 9.6 8.5 - 10.1 mg/dL     Please contact the clinic if you have additional questions.  Thank you.     Sincerely,     Nathaniel Walker PA-C / ashlyn

## 2019-07-11 NOTE — RESULT ENCOUNTER NOTE
MsReinaldo Luna,    All of your labs were normal for you.    Please contact the clinic if you have additional questions.  Thank you.    Sincerely,    Nathaniel Walker PA-C

## 2019-07-11 NOTE — PROGRESS NOTES
Subjective     Dora Luna is a 69 year old female who presents to clinic today for the following health issues:    History of Present Illness        Hypertension: She presents for follow up of hypertension.  She does check blood pressure  regularly outside of the clinic. Outside blood pressures have been over 140/90. She follows a low salt diet.   No chest pain or short of breath. No headache or dizziness.       Also left hip pain for couple month pain with range of motion and laying on left side. No numbness/tingling   No injury. Tx: none    Patient Active Problem List   Diagnosis     Advanced directives, counseling/discussion     HTN, goal below 140/90     Benign essential hypertension     At risk for cardiovascular event     Past Surgical History:   Procedure Laterality Date     APPENDECTOMY         Social History     Tobacco Use     Smoking status: Never Smoker     Smokeless tobacco: Never Used   Substance Use Topics     Alcohol use: No     Family History   Problem Relation Age of Onset     Hypertension Mother          Current Outpatient Medications   Medication Sig Dispense Refill     DiphenhydrAMINE HCl (SIMPLY ALLERGY PO) Take by mouth daily as needed       fluticasone (FLONASE) 50 MCG/ACT spray Spray 1-2 sprays into both nostrils daily 1 Bottle 11     lisinopril (PRINIVIL/ZESTRIL) 10 MG tablet Take 1 tablet (10 mg) by mouth daily 30 tablet 0     metoprolol succinate ER (TOPROL-XL) 100 MG 24 hr tablet Take 1 tablet (100 mg) by mouth daily 90 tablet 1     simvastatin (ZOCOR) 20 MG tablet Take 1 tablet (20 mg) by mouth At Bedtime 90 tablet 3     Allergies   Allergen Reactions     Norvasc [Amlodipine] Other (See Comments)     Chest pain     BP Readings from Last 3 Encounters:   07/11/19 154/82   01/23/19 152/82   07/12/18 144/70    Wt Readings from Last 3 Encounters:   07/11/19 61.7 kg (136 lb)   01/23/19 61.7 kg (136 lb)   07/12/18 61.7 kg (136 lb)           Reviewed and updated as needed this visit by  "Provider  Tobacco  Allergies  Meds  Problems  Med Hx  Surg Hx  Fam Hx         Review of Systems   ROS COMP: Constitutional, HEENT, cardiovascular, pulmonary, gi and gu systems are negative, except as otherwise noted.      Objective    /82   Pulse 64   Temp 98.1  F (36.7  C) (Oral)   Resp 16   Ht 1.575 m (5' 2\")   Wt 61.7 kg (136 lb)   SpO2 97%   BMI 24.87 kg/m    Body mass index is 24.87 kg/m .  Physical Exam   GENERAL: healthy, alert and no distress  RESP: lungs clear to auscultation - no rales, rhonchi or wheezes  CV: regular rate and rhythm, normal S1 S2, no S3 or S4, no murmur, click or rub, no peripheral edema and peripheral pulses strong  ABDOMEN: soft, nontender, no hepatosplenomegaly, no masses and bowel sounds normal  MS: no gross musculoskeletal defects noted, no edema  Right hip with full range of motion. Pain over greater trochanter. 5/5 lower extremities strength. Neurovascularly Intact Distally.      Diagnostic Test Results:  Labs reviewed in Epic  No results found for this or any previous visit (from the past 24 hour(s)).        Assessment & Plan       ICD-10-CM    1. HTN, goal below 140/90 I10 Basic metabolic panel     metoprolol succinate ER (TOPROL-XL) 100 MG 24 hr tablet     lisinopril (PRINIVIL/ZESTRIL) 10 MG tablet   2. At risk for cardiovascular event Z91.89    3. Trochanteric bursitis of left hip M70.62 [facility]   4. Encounter for screening mammogram for breast cancer Z12.31 *MA Screening Digital Bilateral   1. Will add lisinopril. warning signs discussed. side effects discussed   Recheck 2 wks.   2. Labs pending  3. Follow up  With PHYSICAL THERAPY if con't consider cortisone injection.     Return in about 2 weeks (around 7/25/2019) for BP Recheck.    The 10-year ASCVD risk score (Trelljohnny HOLDER Jr., et al., 2013) is: 17.2%    Values used to calculate the score:      Age: 69 years      Sex: Female      Is Non- : No      " Diabetic: No      Tobacco smoker: No      Systolic Blood Pressure: 154 mmHg      Is BP treated: Yes      HDL Cholesterol: 80 mg/dL      Total Cholesterol: 225 mg/dL   Nathaniel Walker PA-C  [facility]

## 2019-07-23 ENCOUNTER — ALLIED HEALTH/NURSE VISIT (OUTPATIENT)
Dept: NURSING | Facility: CLINIC | Age: 69
End: 2019-07-23
Payer: COMMERCIAL

## 2019-07-23 VITALS — DIASTOLIC BLOOD PRESSURE: 80 MMHG | SYSTOLIC BLOOD PRESSURE: 138 MMHG | HEART RATE: 65 BPM

## 2019-07-23 DIAGNOSIS — Z01.30 BP CHECK: Primary | ICD-10-CM

## 2019-07-23 DIAGNOSIS — I10 HTN, GOAL BELOW 140/90: ICD-10-CM

## 2019-07-23 PROCEDURE — 99207 ZZC NO CHARGE NURSE ONLY: CPT

## 2019-07-23 RX ORDER — LISINOPRIL 10 MG/1
10 TABLET ORAL DAILY
Qty: 90 TABLET | Refills: 1 | Status: SHIPPED | OUTPATIENT
Start: 2019-07-23 | End: 2020-01-16

## 2019-07-23 NOTE — NURSING NOTE
SUBJECTIVE:  Dora Luna is an 69 year old female who presents for a blood pressure check.    The reason for the visit is:  a recent medication change    The patient reports that she IS taking the medication as prescribed.     Current Outpatient Medications   Medication     DiphenhydrAMINE HCl (SIMPLY ALLERGY PO)     fluticasone (FLONASE) 50 MCG/ACT spray     lisinopril (PRINIVIL/ZESTRIL) 10 MG tablet     metoprolol succinate ER (TOPROL-XL) 100 MG 24 hr tablet     simvastatin (ZOCOR) 20 MG tablet     No current facility-administered medications for this visit.        Allergies   Allergen Reactions     Norvasc [Amlodipine] Other (See Comments)     Chest pain         OBJECTIVE:  Please get a blood pressure AND a pulse.  A height is also needed if has not been done in the past year.    /89   Pulse 65         If patient has diagnosis of HYPERTENSION, is there a goal on the problem list? Yes  Patient Active Problem List   Diagnosis     Advanced directives, counseling/discussion     HTN, goal below 140/90     Benign essential hypertension     At risk for cardiovascular event       Plan:    Systolic BP    Greater than or equal to 100  OR Less than  140    Diastolic BP    Greater than or equal to 70 OR less than 90    Pulse    Pulse greater than 60 or less than 100      If all the above three parameters are met, patient to go home. Chart CC to Primary Care Provider  If any one of the above three parameters is not met notify RN or provider.

## 2019-07-23 NOTE — Clinical Note
Per pt requesting for a 90 day supply of BP meds, told pt will send message to provider to see if that can be done. Pt verbally understands and has no other questions.

## 2019-12-23 ENCOUNTER — TELEPHONE (OUTPATIENT)
Dept: FAMILY MEDICINE | Facility: CLINIC | Age: 69
End: 2019-12-23

## 2019-12-23 NOTE — LETTER
Dora Luna  [address] [facility] [id number]          December 23, 2019          Dear Dora Luna      Our records indicate that you have not scheduled for a(n)Annual physical exam  which was recommended by your health care team. Monitoring and managing your preventative and chronic health conditions are very important to us.       If you have received your health care elsewhere, please provide us with that information so it can be documented in your chart.    Please call [phone] or message us through your [employer] account to schedule an appointment or provide information for your chart.     We look forward to seeing you and working with you on your health care needs.     Sincerely,   Nathaniel Walker PA-C/agnes          *If you have already scheduled an appointment, please disregard this reminder

## 2019-12-23 NOTE — TELEPHONE ENCOUNTER
Panel Management Review       Patient has the following on her problem list:     Hypertension   Last three blood pressure readings:  BP Readings from Last 3 Encounters:   07/23/19 138/80   07/11/19 154/82   01/23/19 152/82     Blood pressure: FAILED    HTN Guidelines:  Less than 140/90      Composite cancer screening  Chart review shows that this patient is due/due soon for the following Mammogram  Summary:    Patient is due/failing the following:   PHYSICAL    Action needed:   Patient needs office visit for physical.    Type of outreach:    Sent letter.    Questions for provider review:    None                                                                                                                                    Karime Mckeon [facility]     Chart routed to sent letter .

## 2020-01-16 DIAGNOSIS — I10 HTN, GOAL BELOW 140/90: ICD-10-CM

## 2020-01-16 RX ORDER — METOPROLOL SUCCINATE 100 MG/1
TABLET, EXTENDED RELEASE ORAL
Qty: 90 TABLET | Refills: 0 | Status: SHIPPED | OUTPATIENT
Start: 2020-01-16 | End: 2020-04-07

## 2020-01-16 RX ORDER — LISINOPRIL 10 MG/1
TABLET ORAL
Qty: 90 TABLET | Refills: 0 | Status: SHIPPED | OUTPATIENT
Start: 2020-01-16 | End: 2020-04-07

## 2020-01-16 NOTE — TELEPHONE ENCOUNTER
"Requested Prescriptions   Signed Prescriptions Disp Refills    metoprolol succinate ER (TOPROL-XL) 100 MG 24 hr tablet 90 tablet 0     Sig: TAKE 1 TABLET BY MOUTH ONCE DAILY       Beta-Blockers Protocol Passed - 1/16/2020  4:12 PM        Passed - Blood pressure under 140/90 in past 12 months     BP Readings from Last 3 Encounters:   07/23/19 138/80   07/11/19 154/82   01/23/19 152/82                 Passed - Patient is age 6 or older        Passed - Recent (12 mo) or future (30 days) visit within the authorizing provider's specialty     Patient has had an office visit with the authorizing provider or a provider within the authorizing providers department within the previous 12 mos or has a future within next 30 days. See \"Patient Info\" tab in inbasket, or \"Choose Columns\" in Meds & Orders section of the refill encounter.              Passed - Medication is active on med list       lisinopril (PRINIVIL/ZESTRIL) 10 MG tablet 90 tablet 0     Sig: TAKE 1 TABLET BY MOUTH ONCE DAILY       ACE Inhibitors (Including Combos) Protocol Passed - 1/16/2020  4:12 PM        Passed - Blood pressure under 140/90 in past 12 months     BP Readings from Last 3 Encounters:   07/23/19 138/80   07/11/19 154/82   01/23/19 152/82                 Passed - Recent (12 mo) or future (30 days) visit within the authorizing provider's specialty     Patient has had an office visit with the authorizing provider or a provider within the authorizing providers department within the previous 12 mos or has a future within next 30 days. See \"Patient Info\" tab in inbasket, or \"Choose Columns\" in Meds & Orders section of the refill encounter.              Passed - Medication is active on med list        Passed - Patient is age 18 or older        Passed - No active pregnancy on record        Passed - Normal serum creatinine on file in past 12 months     Recent Labs   Lab Test 07/11/19  0933   CR 0.73             Passed - Normal serum potassium on file in " past 12 months     Recent Labs   Lab Test 07/11/19  0933   POTASSIUM 4.2             Passed - No positive pregnancy test within past 12 months

## 2020-04-06 DIAGNOSIS — I10 HTN, GOAL BELOW 140/90: ICD-10-CM

## 2020-04-06 NOTE — TELEPHONE ENCOUNTER
Per Panel Management notes 12/23/19 patient was over due for Physical.  Letter sent.  No pending appointment.  Please advise  Deana Ruiz RN

## 2020-04-07 RX ORDER — LISINOPRIL 10 MG/1
TABLET ORAL
Qty: 90 TABLET | Refills: 0 | Status: SHIPPED | OUTPATIENT
Start: 2020-04-07 | End: 2020-07-14

## 2020-04-07 RX ORDER — METOPROLOL SUCCINATE 100 MG/1
TABLET, EXTENDED RELEASE ORAL
Qty: 90 TABLET | Refills: 0 | Status: SHIPPED | OUTPATIENT
Start: 2020-04-07 | End: 2020-07-14

## 2020-04-07 NOTE — TELEPHONE ENCOUNTER
Ok for refill.  Follow up  In clinic once corona virus is better controlled.    .Nathaniel Walker PA-C

## 2020-07-13 DIAGNOSIS — Z91.89 AT RISK FOR CARDIOVASCULAR EVENT: Primary | ICD-10-CM

## 2020-07-13 DIAGNOSIS — I10 HTN, GOAL BELOW 140/90: ICD-10-CM

## 2020-07-14 RX ORDER — METOPROLOL SUCCINATE 100 MG/1
TABLET, EXTENDED RELEASE ORAL
Qty: 90 TABLET | Refills: 0 | Status: SHIPPED | OUTPATIENT
Start: 2020-07-14 | End: 2020-10-13

## 2020-07-14 RX ORDER — LISINOPRIL 10 MG/1
TABLET ORAL
Qty: 90 TABLET | Refills: 0 | Status: SHIPPED | OUTPATIENT
Start: 2020-07-14 | End: 2020-08-06

## 2020-07-14 RX ORDER — SIMVASTATIN 20 MG
TABLET ORAL
Qty: 90 TABLET | Refills: 0 | Status: SHIPPED | OUTPATIENT
Start: 2020-07-14 | End: 2020-10-13

## 2020-07-14 NOTE — TELEPHONE ENCOUNTER
Routing refill request to provider for review/approval because:  Patient needs to be seen because it has been more than 1 year since last office visit.  BP Readings from Last 6 Encounters:   07/23/19 138/80   07/11/19 154/82   01/23/19 152/82   07/12/18 144/70   06/29/18 158/76   06/14/18 (!) 167/96     Creatinine   Date Value Ref Range Status   07/11/2019 0.73 0.52 - 1.04 mg/dL Final     Potassium   Date Value Ref Range Status   07/11/2019 4.2 3.4 - 5.3 mmol/L Final     Please advise on refill.  Deana Ruiz RN

## 2020-07-14 NOTE — TELEPHONE ENCOUNTER
Called the patient @ [phone]. I have scheduled her for a fasting lab appt. On 07/31/2020 and to see OZIEL Walker for an in clinic OV on 8/06/2020.  FRANC Brenner

## 2020-07-31 DIAGNOSIS — Z91.89 AT RISK FOR CARDIOVASCULAR EVENT: ICD-10-CM

## 2020-07-31 DIAGNOSIS — I10 HTN, GOAL BELOW 140/90: ICD-10-CM

## 2020-07-31 LAB
ANION GAP SERPL CALCULATED.3IONS-SCNC: 6 MMOL/L (ref 3–14)
BUN SERPL-MCNC: 17 MG/DL (ref 7–30)
CALCIUM SERPL-MCNC: 9.2 MG/DL (ref 8.5–10.1)
CHLORIDE SERPL-SCNC: 108 MMOL/L (ref 94–109)
CHOLEST SERPL-MCNC: 172 MG/DL
CO2 SERPL-SCNC: 27 MMOL/L (ref 20–32)
CREAT SERPL-MCNC: 0.78 MG/DL (ref 0.52–1.04)
GFR SERPL CREATININE-BSD FRML MDRD: 76 ML/MIN/{1.73_M2}
GLUCOSE SERPL-MCNC: 101 MG/DL (ref 70–99)
HDLC SERPL-MCNC: 88 MG/DL
LDLC SERPL CALC-MCNC: 70 MG/DL
NONHDLC SERPL-MCNC: 84 MG/DL
POTASSIUM SERPL-SCNC: 4.2 MMOL/L (ref 3.4–5.3)
SODIUM SERPL-SCNC: 141 MMOL/L (ref 133–144)
TRIGL SERPL-MCNC: 69 MG/DL

## 2020-07-31 PROCEDURE — 80061 LIPID PANEL: CPT | Performed by: PHYSICIAN ASSISTANT

## 2020-07-31 PROCEDURE — 36415 COLL VENOUS BLD VENIPUNCTURE: CPT | Performed by: PHYSICIAN ASSISTANT

## 2020-07-31 PROCEDURE — 80048 BASIC METABOLIC PNL TOTAL CA: CPT | Performed by: PHYSICIAN ASSISTANT

## 2020-08-06 ENCOUNTER — OFFICE VISIT (OUTPATIENT)
Dept: FAMILY MEDICINE | Facility: CLINIC | Age: 70
End: 2020-08-06
Payer: COMMERCIAL

## 2020-08-06 VITALS
HEART RATE: 69 BPM | WEIGHT: 133 LBS | TEMPERATURE: 96.8 F | SYSTOLIC BLOOD PRESSURE: 156 MMHG | BODY MASS INDEX: 24.48 KG/M2 | OXYGEN SATURATION: 97 % | HEIGHT: 62 IN | DIASTOLIC BLOOD PRESSURE: 84 MMHG | RESPIRATION RATE: 16 BRPM

## 2020-08-06 DIAGNOSIS — Z12.31 VISIT FOR SCREENING MAMMOGRAM: ICD-10-CM

## 2020-08-06 DIAGNOSIS — I10 HTN, GOAL BELOW 140/90: Primary | ICD-10-CM

## 2020-08-06 PROCEDURE — 99213 OFFICE O/P EST LOW 20 MIN: CPT | Performed by: PHYSICIAN ASSISTANT

## 2020-08-06 RX ORDER — LISINOPRIL 10 MG/1
20 TABLET ORAL DAILY
Qty: 90 TABLET | Refills: 0
Start: 2020-08-06 | End: 2020-10-13

## 2020-08-06 ASSESSMENT — MIFFLIN-ST. JEOR: SCORE: 1071.53

## 2020-08-06 NOTE — PROGRESS NOTES
Subjective     Dora Luna is a 70 year old female who presents to clinic today for the following health issues:    HPI       Hyperlipidemia Follow-Up      Are you regularly taking any medication or supplement to lower your cholesterol?   GENERAL: healthy, alert and no distress    NECK: no adenopathy, no asymmetry, masses, or scars and thyroid normal to palpation    RESP: lungs clear to auscultation - no rales, rhonchi or wheezes    CV: regular rate and rhythm, normal S1 S2, no S3 or S4, no murmur, click or rub, no peripheral edema and peripheral pulses strong    ABDOMEN: soft, nontender, no hepatosplenomegaly, no masses and bowel sounds normal    MS: no gross musculoskeletal defects noted, no edema    Are you having muscle aches or other side effects that you think could be caused by your cholesterol lowering medication?  No    Hypertension Follow-up      Do you check your blood pressure regularly outside of the clinic? Yes     Are you following a low salt diet? Less salt     Are your blood pressures ever more than 140 on the top number (systolic) OR more   than 90 on the bottom number (diastolic), for example 140/90? Yes    States she had EKG about 10 yrs ago due to a syncypical episode. She was told it was normal. No records.     Patient Active Problem List   Diagnosis     Advanced directives, counseling/discussion     HTN, goal below 140/90     Benign essential hypertension     At risk for cardiovascular event     Past Surgical History:   Procedure Laterality Date     APPENDECTOMY         Social History     Tobacco Use     Smoking status: Never Smoker     Smokeless tobacco: Never Used   Substance Use Topics     Alcohol use: No     Family History   Problem Relation Age of Onset     Hypertension Mother          Current Outpatient Medications   Medication Sig Dispense Refill     DiphenhydrAMINE HCl (SIMPLY ALLERGY PO) Take by mouth daily as needed       fluticasone (FLONASE) 50 MCG/ACT spray Spray 1-2 sprays  "into both nostrils daily 1 Bottle 11     lisinopril (ZESTRIL) 10 MG tablet Take 2 tablets (20 mg) by mouth daily 90 tablet 0     metoprolol succinate ER (TOPROL-XL) 100 MG 24 hr tablet TAKE 1 TABLET BY MOUTH ONCE DAILY 90 tablet 0     simvastatin (ZOCOR) 20 MG tablet TAKE 1 TABLET BY MOUTH AT BEDTIME 90 tablet 0     Allergies   Allergen Reactions     Norvasc [Amlodipine] Other (See Comments)     Chest pain     BP Readings from Last 3 Encounters:   08/06/20 (!) 156/84   07/23/19 138/80   07/11/19 154/82    Wt Readings from Last 3 Encounters:   08/06/20 60.3 kg (133 lb)   07/11/19 61.7 kg (136 lb)   01/23/19 61.7 kg (136 lb)            Reviewed and updated as needed this visit by Provider  Tobacco  Allergies  Meds  Problems  Med Hx  Surg Hx  Fam Hx         Review of Systems   Constitutional, HEENT, cardiovascular, pulmonary, gi and gu systems are negative, except as otherwise noted.      Objective    BP (!) 156/84   Pulse 69   Temp 96.8  F (36  C) (Tympanic)   Resp 16   Ht 1.575 m (5' 2\")   Wt 60.3 kg (133 lb)   SpO2 97%   BMI 24.33 kg/m    Body mass index is 24.33 kg/m .  Physical Exam   GENERAL: healthy, alert and no distress  NECK: no adenopathy, no asymmetry, masses, or scars and thyroid normal to palpation  RESP: lungs clear to auscultation - no rales, rhonchi or wheezes  CV: regular rate and rhythm, normal S1 S2, no S3 or S4, no murmur, click or rub, no peripheral edema and peripheral pulses strong  ABDOMEN: soft, nontender, no hepatosplenomegaly, no masses and bowel sounds normal  MS: no gross musculoskeletal defects noted, no edema    Diagnostic Test Results:  Labs reviewed in Epic        Assessment & Plan       ICD-10-CM    1. HTN, goal below 140/90  I10 **Basic metabolic panel FUTURE anytime     lisinopril (ZESTRIL) 10 MG tablet   2. Visit for screening mammogram  Z12.31 *MA Screening Digital Bilateral   increase lisinopril to 20mg daily. Recheck blood pressure and labs in 2 wks with ancillary. " warning signs discussed. side effects discussed  Work on Healthy diet and exercise. Getting heart rate elevated for 30 mins most days of week.  If blood pressure stable recheck 6 months.     Return in about 2 weeks (around 8/20/2020) for BP check with Ancillary, Lab Work- Non Fasting.    Nathaniel Walker PA-C  [facility]

## 2020-08-20 ENCOUNTER — ALLIED HEALTH/NURSE VISIT (OUTPATIENT)
Dept: NURSING | Facility: CLINIC | Age: 70
End: 2020-08-20
Payer: COMMERCIAL

## 2020-08-20 VITALS — DIASTOLIC BLOOD PRESSURE: 78 MMHG | SYSTOLIC BLOOD PRESSURE: 132 MMHG

## 2020-08-20 DIAGNOSIS — I10 HTN, GOAL BELOW 140/90: ICD-10-CM

## 2020-08-20 DIAGNOSIS — Z01.30 BP CHECK: Primary | ICD-10-CM

## 2020-08-20 LAB
ANION GAP SERPL CALCULATED.3IONS-SCNC: 6 MMOL/L (ref 3–14)
BUN SERPL-MCNC: 19 MG/DL (ref 7–30)
CALCIUM SERPL-MCNC: 9 MG/DL (ref 8.5–10.1)
CHLORIDE SERPL-SCNC: 106 MMOL/L (ref 94–109)
CO2 SERPL-SCNC: 28 MMOL/L (ref 20–32)
CREAT SERPL-MCNC: 0.87 MG/DL (ref 0.52–1.04)
GFR SERPL CREATININE-BSD FRML MDRD: 66 ML/MIN/{1.73_M2}
GLUCOSE SERPL-MCNC: 89 MG/DL (ref 70–99)
POTASSIUM SERPL-SCNC: 4 MMOL/L (ref 3.4–5.3)
SODIUM SERPL-SCNC: 140 MMOL/L (ref 133–144)

## 2020-08-20 PROCEDURE — 80048 BASIC METABOLIC PNL TOTAL CA: CPT | Performed by: PHYSICIAN ASSISTANT

## 2020-08-20 PROCEDURE — 99207 ZZC NO CHARGE NURSE ONLY: CPT

## 2020-08-20 PROCEDURE — 36415 COLL VENOUS BLD VENIPUNCTURE: CPT | Performed by: PHYSICIAN ASSISTANT

## 2020-08-20 NOTE — LETTER
August 20, 2020      Dora Luna  [address] [facility] [address]        Dear ,    We are writing to inform you of your test results.    All of your labs were normal/near normal for you.       Please contact the clinic if you have additional questions.  Thank you.     Sincerely,     Nathaniel Walker PA-C /[facility]    Resulted Orders   **Basic metabolic panel FUTURE anytime   Result Value Ref Range    Sodium 140 133 - 144 mmol/L    Potassium 4.0 3.4 - 5.3 mmol/L    Chloride 106 94 - 109 mmol/L    Carbon Dioxide 28 20 - 32 mmol/L    Anion Gap 6 3 - 14 mmol/L    Glucose 89 70 - 99 mg/dL      Comment:      Non Fasting    Urea Nitrogen 19 7 - 30 mg/dL    Creatinine 0.87 0.52 - 1.04 mg/dL    GFR Estimate 66 >60 mL/min/[1.73_m2]      Comment:      Non  GFR Calc  Starting 12/18/2018, serum creatinine based estimated GFR (eGFR) will be   calculated using the Chronic Kidney Disease Epidemiology Collaboration   (CKD-EPI) equation.      GFR Estimate If Black 77 >60 mL/min/[1.73_m2]      Comment:       GFR Calc  Starting 12/18/2018, serum creatinine based estimated GFR (eGFR) will be   calculated using the Chronic Kidney Disease Epidemiology Collaboration   (CKD-EPI) equation.      Calcium 9.0 8.5 - 10.1 mg/dL

## 2020-08-20 NOTE — PROGRESS NOTES
SUBJECTIVE:  Dora Luna is an 70 year old female who presents for a blood pressure check.    The reason for the visit is:  an elevated blood pressure was noted elsewhere and they were told to come for a recheck    The patient reports that she IS taking the medication as prescribed.     Current Outpatient Medications   Medication     DiphenhydrAMINE HCl (SIMPLY ALLERGY PO)     fluticasone (FLONASE) 50 MCG/ACT spray     lisinopril (ZESTRIL) 10 MG tablet     metoprolol succinate ER (TOPROL-XL) 100 MG 24 hr tablet     simvastatin (ZOCOR) 20 MG tablet     No current facility-administered medications for this visit.        Allergies   Allergen Reactions     Norvasc [Amlodipine] Other (See Comments)     Chest pain         OBJECTIVE:  Please get a blood pressure AND a pulse.  A height is also needed if has not been done in the past year.    /78         If patient has diagnosis of HYPERTENSION, is there a goal on the problem list? Yes  Patient Active Problem List   Diagnosis     Advanced directives, counseling/discussion     HTN, goal below 140/90     Benign essential hypertension     At risk for cardiovascular event       Plan:    Systolic BP    Greater than or equal to 100  OR Less than  140    Diastolic BP    Greater than or equal to 70 OR less than 90    Pulse    Pulse greater than 60 or less than 100      If all the above three parameters are met, patient to go home. Chart CC to Primary Care Provider  If any one of the above three parameters is not met notify RN or provider.

## 2020-08-20 NOTE — RESULT ENCOUNTER NOTE
Ms. Cheryl,    All of your labs were normal/near normal for you.    Please contact the clinic if you have additional questions.  Thank you.    Sincerely,    Nathaniel Walker PA-C

## 2020-09-17 ENCOUNTER — OFFICE VISIT (OUTPATIENT)
Dept: OPTOMETRY | Facility: CLINIC | Age: 70
End: 2020-09-17
Payer: COMMERCIAL

## 2020-09-17 DIAGNOSIS — H25.813 MIXED TYPE AGE-RELATED CATARACT, BOTH EYES: Primary | ICD-10-CM

## 2020-09-17 DIAGNOSIS — H52.223 REGULAR ASTIGMATISM OF BOTH EYES: ICD-10-CM

## 2020-09-17 DIAGNOSIS — H52.03 HYPEROPIA, BILATERAL: ICD-10-CM

## 2020-09-17 DIAGNOSIS — H04.123 DRY EYES: ICD-10-CM

## 2020-09-17 DIAGNOSIS — H52.4 PRESBYOPIA: ICD-10-CM

## 2020-09-17 PROCEDURE — 92004 COMPRE OPH EXAM NEW PT 1/>: CPT | Performed by: OPTOMETRIST

## 2020-09-17 PROCEDURE — 92015 DETERMINE REFRACTIVE STATE: CPT | Performed by: OPTOMETRIST

## 2020-09-17 ASSESSMENT — REFRACTION_WEARINGRX
OS_AXIS: 020
OD_CYLINDER: +0.50
OS_ADD: +2.25
SPECS_TYPE: BIFOCAL
OS_CYLINDER: +1.25
OD_SPHERE: +0.50
OS_SPHERE: -0.25
OD_ADD: +2.25
OD_AXIS: 150

## 2020-09-17 ASSESSMENT — VISUAL ACUITY
OD_CC: 20/30
OS_CC: 20/30
OD_CC+: -2
OS_CC: 20/30
METHOD: SNELLEN - LINEAR
OS_CC+: +1
OD_CC: 20/25
CORRECTION_TYPE: GLASSES

## 2020-09-17 ASSESSMENT — REFRACTION_MANIFEST
OD_AXIS: 159
OD_SPHERE: +0.75
OS_CYLINDER: +1.00
OD_CYLINDER: +0.50
OS_CYLINDER: +1.25
OS_SPHERE: -0.50
OD_AXIS: 165
OS_ADD: +2.50
METHOD_AUTOREFRACTION: 1
OD_ADD: +2.50
OD_CYLINDER: +0.50
OS_AXIS: 010
OD_SPHERE: +0.50
OS_AXIS: 019
OS_SPHERE: +0.25

## 2020-09-17 ASSESSMENT — TONOMETRY
OD_IOP_MMHG: 16
OS_IOP_MMHG: 16
IOP_METHOD: APPLANATION

## 2020-09-17 ASSESSMENT — KERATOMETRY
OD_K1POWER_DIOPTERS: 44.50
OS_K1POWER_DIOPTERS: 45.50
OS_K2POWER_DIOPTERS: 45.00
OD_K2POWER_DIOPTERS: 44.75
OS_AXISANGLE2_DEGREES: 14
OD_AXISANGLE2_DEGREES: 18

## 2020-09-17 ASSESSMENT — CONF VISUAL FIELD
METHOD: COUNTING FINGERS
OD_NORMAL: 1
OS_NORMAL: 1

## 2020-09-17 ASSESSMENT — CUP TO DISC RATIO
OS_RATIO: 0.6
OD_RATIO: 0.6

## 2020-09-17 ASSESSMENT — SLIT LAMP EXAM - LIDS
COMMENTS: LOWER LID LAXITY
COMMENTS: LOWER LID LAXITY

## 2020-09-17 NOTE — LETTER
9/17/2020         RE: Dora Luna  [address] [facility] [address]        Dear Colleague,    Thank you for referring your patient, Dora Luna, to the [facility]. Please see a copy of my visit note below.    Chief Complaint   Patient presents with     Annual Eye Exam     New to Eye Dept       Accompanied by , out in the lobby     Last Eye Exam: 5+ years   Dilated Previously: Yes    What are you currently using to see?  glasses       Distance Vision Acuity: Satisfied with vision, seems ok     Near Vision Acuity: Not satisfied, it's getting a little harder, and she gets a headache when she reads now longer than 30 minutes , frontal pain, some time to resolve       Eye Comfort: itchy evening and left eye much worse seems to get a film and stringy thing- white  in it , reports dry mouth   allergy pill twice a day to help , tried allergy drop   Do you use eye drops? : Yes: Uses an artificial tear not take red out  in the evenings lasts 15 minutes   Occupation or Hobbies: Retired - loves to read     Daxa Apple Optometric Assistant           Medical, surgical and family histories reviewed and updated 9/17/2020.     Takes DiphenhydrAMINE HCl (SIMPLY ALLERGY PO) OTC antihistamine takes twice a day almost everyday    OBJECTIVE: See Ophthalmology exam    ASSESSMENT:    ICD-10-CM    1. Mixed type age-related cataract, both eyes  H25.813    2. Hyperopia, bilateral  H52.03    3. Regular astigmatism of both eyes  H52.223    4. Presbyopia  H52.4    5. Dry eyes  H04.123       PLAN:     Patient Instructions   Patient was advised of today's exam findings.  Fill glasses prescription  Allow 2 weeks to adapt to change in glasses  Use over the counter artificial tears 3 times a day (Thera Tears , Thera Tears Extra or Refresh Optive)  Monitor mild cataracts   Return in 1 year for eye exam or sooner if vision gets worse     Anabella Olson O.D.  [facility]   [address] [place] [address]  [phone]           Again, thank you for allowing me to participate in the care of your patient.        Sincerely,        Anabella Olson, OD

## 2020-09-17 NOTE — PATIENT INSTRUCTIONS
Patient was advised of today's exam findings.  Fill glasses prescription  Allow 2 weeks to adapt to change in glasses  Use over the counter artificial tears 3 times a day (Thera Tears , Thera Tears Extra or Refresh Optive)  Monitor mild cataracts   Return in 1 year for eye exam or sooner if vision gets worse     Anabella Olson O.D.  [facility]   [address] [place], [place] [id number] [phone]

## 2020-09-17 NOTE — PROGRESS NOTES
Chief Complaint   Patient presents with     Annual Eye Exam     New to Eye Dept       Accompanied by , out in the lobby     Last Eye Exam: 5+ years   Dilated Previously: Yes    What are you currently using to see?  glasses       Distance Vision Acuity: Satisfied with vision, seems ok     Near Vision Acuity: Not satisfied, it's getting a little harder, and she gets a headache when she reads now longer than 30 minutes , frontal pain, some time to resolve       Eye Comfort: itchy evening and left eye much worse seems to get a film and stringy thing- white  in it , reports dry mouth   allergy pill twice a day to help , tried allergy drop   Do you use eye drops? : Yes: Uses an artificial tear not take red out  in the evenings lasts 15 minutes   Occupation or Hobbies: Retired - loves to read     Daxa Apple Optometric Assistant           Medical, surgical and family histories reviewed and updated 9/17/2020.     Takes DiphenhydrAMINE HCl (SIMPLY ALLERGY PO) OTC antihistamine takes twice a day almost everyday    OBJECTIVE: See Ophthalmology exam    ASSESSMENT:    ICD-10-CM    1. Mixed type age-related cataract, both eyes  H25.813    2. Hyperopia, bilateral  H52.03    3. Regular astigmatism of both eyes  H52.223    4. Presbyopia  H52.4    5. Dry eyes  H04.123       PLAN:     Patient Instructions   Patient was advised of today's exam findings.  Fill glasses prescription  Allow 2 weeks to adapt to change in glasses  Use over the counter artificial tears 3 times a day (Thera Tears , Thera Tears Extra or Refresh Optive)  Monitor mild cataracts   Return in 1 year for eye exam or sooner if vision gets worse     Anabella Olson O.D.  [facility]   [address] [place], [place] [id number] [phone]

## 2020-10-13 DIAGNOSIS — Z91.89 AT RISK FOR CARDIOVASCULAR EVENT: ICD-10-CM

## 2020-10-13 DIAGNOSIS — I10 HTN, GOAL BELOW 140/90: ICD-10-CM

## 2020-10-13 RX ORDER — SIMVASTATIN 20 MG
TABLET ORAL
Qty: 90 TABLET | Refills: 2 | Status: SHIPPED | OUTPATIENT
Start: 2020-10-13 | End: 2021-10-13

## 2020-10-13 RX ORDER — LISINOPRIL 10 MG/1
TABLET ORAL
Qty: 90 TABLET | Refills: 1 | Status: SHIPPED | OUTPATIENT
Start: 2020-10-13 | End: 2020-10-14 | Stop reason: DRUGHIGH

## 2020-10-13 RX ORDER — METOPROLOL SUCCINATE 100 MG/1
TABLET, EXTENDED RELEASE ORAL
Qty: 90 TABLET | Refills: 2 | Status: SHIPPED | OUTPATIENT
Start: 2020-10-13 | End: 2021-07-13

## 2020-10-13 NOTE — TELEPHONE ENCOUNTER
To provider, needing new prescription for Lisinopril 20mg, was increased on 8/6 office visit     Elva SOMMERSN, RN, CPN

## 2020-10-14 ENCOUNTER — TELEPHONE (OUTPATIENT)
Dept: FAMILY MEDICINE | Facility: CLINIC | Age: 70
End: 2020-10-14

## 2020-10-14 RX ORDER — LISINOPRIL 20 MG/1
20 TABLET ORAL DAILY
Qty: 90 TABLET | Refills: 0 | Status: SHIPPED | OUTPATIENT
Start: 2020-10-14 | End: 2021-01-12

## 2020-10-14 NOTE — TELEPHONE ENCOUNTER
Pharmacy informed to disregard prescription for 10mg, 20mg is correct prescription      Elva SOMMERSN, RN, CPN

## 2020-10-14 NOTE — TELEPHONE ENCOUNTER
Reason for Call:  Other prescription    Detailed comments: lisinopril 10 was sent yesterday and today they got a prescription for lisinopril 20. Which is correct??    Phone Number Patient can be reached at: Home number on file [phone] (home)    Best Time: any    Can we leave a detailed message on this number? YES    Call taken on 10/14/2020 at 10:30 AM by Deb Hdz

## 2021-01-12 DIAGNOSIS — I10 HTN, GOAL BELOW 140/90: ICD-10-CM

## 2021-01-12 RX ORDER — LISINOPRIL 20 MG/1
TABLET ORAL
Qty: 90 TABLET | Refills: 0 | Status: SHIPPED | OUTPATIENT
Start: 2021-01-12 | End: 2021-02-11

## 2021-02-10 NOTE — PROGRESS NOTES
"  Assessment & Plan       ICD-10-CM    1. Impacted cerumen of right ear  H61.21    2. HTN, goal below 140/90  I10 lisinopril (ZESTRIL) 20 MG tablet     Basic metabolic panel   3. Hair loss  L65.9 CBC with platelets and differential     TSH with free T4 reflex   1.  MA irrigated cerumen out ear looks fine.  2.  Medical condition is stable okay for refills.  Labs are pending.  3.  Unclear etiology labs are pending.  Have her stop her shampoo product and monitor things over the next month.  If she continues to have symptoms may have her follow-up with dermatology.    Return in about 6 months (around 8/11/2021) for BP Recheck.    Nathaniel Walker PA-C  [facility] ADRIANO Smith   Dora is a 70 year old who presents for the following health issues  accompanied by her Self:    HPI       Concern - Ear Problem   Onset: on and off x 2 weeks now  Description: Both ears feels plugged per pt  Intensity: moderate  Progression of Symptoms:  worsening  Accompanying Signs & Symptoms: r ear pain  Previous history of similar problem: none    Alleviating factors:        Improved by: none  Therapies tried and outcome: Per pt was given a med for ears recently.    Hypertension Follow-up      Do you check your blood pressure regularly outside of the clinic? Yes     Are you following a low salt diet? Yes    Are your blood pressures ever more than 140 on the top number (systolic) OR more   than 90 on the bottom number (diastolic), for example 140/90? No  Also states over the last couple weeks she has noticed hair loss.  Mostly in the shower.  She has been using a new shampoo product it enough that was related.  She otherwise feels fine.    Review of Systems   Constitutional, HEENT, cardiovascular, pulmonary, gi and gu systems are negative, except as otherwise noted.      Objective    /83   Pulse 64   Temp 97.8  F (36.6  C) (Tympanic)   Resp 14   Ht 1.575 m (5' 2\")   Wt 59.4 kg (131 lb)   SpO2 97%   " Breastfeeding No   BMI 23.96 kg/m    Body mass index is 23.96 kg/m .  Physical Exam   GENERAL: healthy, alert and no distress  EYES: Eyes grossly normal to inspection, PERRL and conjunctivae and sclerae normal  HENT: ear canals and TM's normal, nose and mouth without ulcers or lesions cerumen in right ear was irrigated out by my MA.  On scalp exam there is no large patches of hair missing.  NECK: no adenopathy, no asymmetry, masses, or scars and thyroid normal to palpation  RESP: lungs clear to auscultation - no rales, rhonchi or wheezes  CV: regular rate and rhythm, normal S1 S2, no S3 or S4, no murmur, click or rub, no peripheral edema and peripheral pulses strong  MS: no gross musculoskeletal defects noted, no edema    Labs pending

## 2021-02-11 ENCOUNTER — OFFICE VISIT (OUTPATIENT)
Dept: FAMILY MEDICINE | Facility: CLINIC | Age: 71
End: 2021-02-11
Payer: COMMERCIAL

## 2021-02-11 VITALS
HEART RATE: 64 BPM | SYSTOLIC BLOOD PRESSURE: 130 MMHG | WEIGHT: 131 LBS | DIASTOLIC BLOOD PRESSURE: 83 MMHG | TEMPERATURE: 97.8 F | HEIGHT: 62 IN | BODY MASS INDEX: 24.11 KG/M2 | OXYGEN SATURATION: 97 % | RESPIRATION RATE: 14 BRPM

## 2021-02-11 DIAGNOSIS — L65.9 HAIR LOSS: ICD-10-CM

## 2021-02-11 DIAGNOSIS — H61.21 IMPACTED CERUMEN OF RIGHT EAR: Primary | ICD-10-CM

## 2021-02-11 DIAGNOSIS — I10 HTN, GOAL BELOW 140/90: ICD-10-CM

## 2021-02-11 LAB
ANION GAP SERPL CALCULATED.3IONS-SCNC: 3 MMOL/L (ref 3–14)
BASOPHILS # BLD AUTO: 0 10E9/L (ref 0–0.2)
BASOPHILS NFR BLD AUTO: 0.3 %
BUN SERPL-MCNC: 17 MG/DL (ref 7–30)
CALCIUM SERPL-MCNC: 9.5 MG/DL (ref 8.5–10.1)
CHLORIDE SERPL-SCNC: 105 MMOL/L (ref 94–109)
CO2 SERPL-SCNC: 30 MMOL/L (ref 20–32)
CREAT SERPL-MCNC: 0.66 MG/DL (ref 0.52–1.04)
DIFFERENTIAL METHOD BLD: NORMAL
EOSINOPHIL # BLD AUTO: 0.2 10E9/L (ref 0–0.7)
EOSINOPHIL NFR BLD AUTO: 2.8 %
ERYTHROCYTE [DISTWIDTH] IN BLOOD BY AUTOMATED COUNT: 12.6 % (ref 10–15)
GFR SERPL CREATININE-BSD FRML MDRD: 88 ML/MIN/{1.73_M2}
GLUCOSE SERPL-MCNC: 100 MG/DL (ref 70–99)
HCT VFR BLD AUTO: 41.6 % (ref 35–47)
HGB BLD-MCNC: 13.7 G/DL (ref 11.7–15.7)
LYMPHOCYTES # BLD AUTO: 1.6 10E9/L (ref 0.8–5.3)
LYMPHOCYTES NFR BLD AUTO: 21.6 %
MCH RBC QN AUTO: 30.3 PG (ref 26.5–33)
MCHC RBC AUTO-ENTMCNC: 32.9 G/DL (ref 31.5–36.5)
MCV RBC AUTO: 92 FL (ref 78–100)
MONOCYTES # BLD AUTO: 0.7 10E9/L (ref 0–1.3)
MONOCYTES NFR BLD AUTO: 9.2 %
NEUTROPHILS # BLD AUTO: 4.8 10E9/L (ref 1.6–8.3)
NEUTROPHILS NFR BLD AUTO: 66.1 %
PLATELET # BLD AUTO: 264 10E9/L (ref 150–450)
POTASSIUM SERPL-SCNC: 4.4 MMOL/L (ref 3.4–5.3)
RBC # BLD AUTO: 4.52 10E12/L (ref 3.8–5.2)
SODIUM SERPL-SCNC: 138 MMOL/L (ref 133–144)
TSH SERPL DL<=0.005 MIU/L-ACNC: 1.45 MU/L (ref 0.4–4)
WBC # BLD AUTO: 7.3 10E9/L (ref 4–11)

## 2021-02-11 PROCEDURE — 80048 BASIC METABOLIC PNL TOTAL CA: CPT | Performed by: PHYSICIAN ASSISTANT

## 2021-02-11 PROCEDURE — 36415 COLL VENOUS BLD VENIPUNCTURE: CPT | Performed by: PHYSICIAN ASSISTANT

## 2021-02-11 PROCEDURE — 99213 OFFICE O/P EST LOW 20 MIN: CPT | Performed by: PHYSICIAN ASSISTANT

## 2021-02-11 PROCEDURE — 85025 COMPLETE CBC W/AUTO DIFF WBC: CPT | Performed by: PHYSICIAN ASSISTANT

## 2021-02-11 PROCEDURE — 84443 ASSAY THYROID STIM HORMONE: CPT | Performed by: PHYSICIAN ASSISTANT

## 2021-02-11 RX ORDER — LISINOPRIL 20 MG/1
20 TABLET ORAL DAILY
Qty: 90 TABLET | Refills: 0 | Status: SHIPPED | OUTPATIENT
Start: 2021-02-11 | End: 2021-07-13

## 2021-02-11 ASSESSMENT — MIFFLIN-ST. JEOR: SCORE: 1062.46

## 2021-02-11 NOTE — LETTER
February 11, 2021      Dora Luna  [address] [facility] [address]        Ms. Luna,     All of your labs were normal/near normal for you.     Please contact the clinic if you have additional questions.  Thank you.     Sincerely,     Nathaniel Walker PA-C     Resulted Orders   Basic metabolic panel   Result Value Ref Range    Sodium 138 133 - 144 mmol/L    Potassium 4.4 3.4 - 5.3 mmol/L    Chloride 105 94 - 109 mmol/L    Carbon Dioxide 30 20 - 32 mmol/L    Anion Gap 3 3 - 14 mmol/L    Glucose 100 (H) 70 - 99 mg/dL      Comment:      Non Fasting    Urea Nitrogen 17 7 - 30 mg/dL    Creatinine 0.66 0.52 - 1.04 mg/dL    GFR Estimate 88 >60 mL/min/[1.73_m2]      Comment:      Non  GFR Calc  Starting 12/18/2018, serum creatinine based estimated GFR (eGFR) will be   calculated using the Chronic Kidney Disease Epidemiology Collaboration   (CKD-EPI) equation.      GFR Estimate If Black >90 >60 mL/min/[1.73_m2]      Comment:       GFR Calc  Starting 12/18/2018, serum creatinine based estimated GFR (eGFR) will be   calculated using the Chronic Kidney Disease Epidemiology Collaboration   (CKD-EPI) equation.      Calcium 9.5 8.5 - 10.1 mg/dL   CBC with platelets and differential   Result Value Ref Range    WBC 7.3 4.0 - 11.0 10e9/L    RBC Count 4.52 3.8 - 5.2 10e12/L    Hemoglobin 13.7 11.7 - 15.7 g/dL    Hematocrit 41.6 35.0 - 47.0 %    MCV 92 78 - 100 fl    MCH 30.3 26.5 - 33.0 pg    MCHC 32.9 31.5 - 36.5 g/dL    RDW 12.6 10.0 - 15.0 %    Platelet Count 264 150 - 450 10e9/L    % Neutrophils 66.1 %    % Lymphocytes 21.6 %    % Monocytes 9.2 %    % Eosinophils 2.8 %    % Basophils 0.3 %    Absolute Neutrophil 4.8 1.6 - 8.3 10e9/L    Absolute Lymphocytes 1.6 0.8 - 5.3 10e9/L    Absolute Monocytes 0.7 0.0 - 1.3 10e9/L    Absolute Eosinophils 0.2 0.0 - 0.7 10e9/L    Absolute Basophils 0.0 0.0 - 0.2 10e9/L    Diff Method Automated Method    TSH with free T4 reflex   Result Value Ref Range     TSH 1.45 0.40 - 4.00 mU/L

## 2021-07-12 DIAGNOSIS — I10 HTN, GOAL BELOW 140/90: ICD-10-CM

## 2021-07-13 RX ORDER — LISINOPRIL 20 MG/1
TABLET ORAL
Qty: 90 TABLET | Refills: 0 | Status: SHIPPED | OUTPATIENT
Start: 2021-07-13 | End: 2021-10-27 | Stop reason: DRUGHIGH

## 2021-07-13 RX ORDER — METOPROLOL SUCCINATE 100 MG/1
TABLET, EXTENDED RELEASE ORAL
Qty: 90 TABLET | Refills: 0 | Status: SHIPPED | OUTPATIENT
Start: 2021-07-13 | End: 2021-10-13

## 2021-09-21 ENCOUNTER — TELEPHONE (OUTPATIENT)
Dept: FAMILY MEDICINE | Facility: CLINIC | Age: 71
End: 2021-09-21

## 2021-09-21 NOTE — LETTER
September 21, 2021      Dora Luna  [address] [address]      Your healthcare team cares about your health. To provide you with the best care,   we have reviewed your chart and based on our findings, we see that you are due to:     - BREAST CANCER SCREENING:  Schedule Annual Mammogram. Breast center scheduling number - [phone] or schedule in MyChart (self referall)    If you have already completed these items, please contact the clinic via phone or   [employer] so your care team can review and update your records. Thank you for   choosing [facility] for your healthcare needs. For any questions,   concerns, or to schedule an appointment please contact the clinic.       Healthy Regards,      Your [facility] Care Team

## 2021-09-21 NOTE — TELEPHONE ENCOUNTER
Patient Quality Outreach      Summary:    Patient has the following on her problem list/HM:     Patient is due/failing the following:   Breast Cancer Screening - Mammogram    Type of outreach:    Sent letter.    Questions for provider review:    None                                                                                                                                     Nina Cuello CMA       Chart routed to closed.

## 2021-10-13 ENCOUNTER — OFFICE VISIT (OUTPATIENT)
Dept: FAMILY MEDICINE | Facility: CLINIC | Age: 71
End: 2021-10-13
Payer: COMMERCIAL

## 2021-10-13 VITALS
HEART RATE: 64 BPM | DIASTOLIC BLOOD PRESSURE: 92 MMHG | WEIGHT: 130.6 LBS | TEMPERATURE: 97.6 F | BODY MASS INDEX: 23.89 KG/M2 | SYSTOLIC BLOOD PRESSURE: 190 MMHG | OXYGEN SATURATION: 96 %

## 2021-10-13 DIAGNOSIS — E78.00 HIGH CHOLESTEROL: ICD-10-CM

## 2021-10-13 DIAGNOSIS — Z00.00 ROUTINE GENERAL MEDICAL EXAMINATION AT A HEALTH CARE FACILITY: Primary | ICD-10-CM

## 2021-10-13 DIAGNOSIS — Z91.89 AT RISK FOR CARDIOVASCULAR EVENT: ICD-10-CM

## 2021-10-13 DIAGNOSIS — I10 HTN, GOAL BELOW 140/90: ICD-10-CM

## 2021-10-13 DIAGNOSIS — Z78.0 POST-MENOPAUSAL: ICD-10-CM

## 2021-10-13 DIAGNOSIS — Z12.31 ENCOUNTER FOR SCREENING MAMMOGRAM FOR BREAST CANCER: ICD-10-CM

## 2021-10-13 DIAGNOSIS — R07.9 CHEST PAIN, UNSPECIFIED TYPE: ICD-10-CM

## 2021-10-13 LAB
ANION GAP SERPL CALCULATED.3IONS-SCNC: 5 MMOL/L (ref 3–14)
BUN SERPL-MCNC: 19 MG/DL (ref 7–30)
CALCIUM SERPL-MCNC: 9.6 MG/DL (ref 8.5–10.1)
CHLORIDE BLD-SCNC: 104 MMOL/L (ref 94–109)
CHOLEST SERPL-MCNC: 182 MG/DL
CO2 SERPL-SCNC: 29 MMOL/L (ref 20–32)
CREAT SERPL-MCNC: 0.71 MG/DL (ref 0.52–1.04)
FASTING STATUS PATIENT QL REPORTED: YES
GFR SERPL CREATININE-BSD FRML MDRD: 85 ML/MIN/1.73M2
GLUCOSE BLD-MCNC: 98 MG/DL (ref 70–99)
HDLC SERPL-MCNC: 95 MG/DL
LDLC SERPL CALC-MCNC: 75 MG/DL
NONHDLC SERPL-MCNC: 87 MG/DL
POTASSIUM BLD-SCNC: 4.2 MMOL/L (ref 3.4–5.3)
SODIUM SERPL-SCNC: 138 MMOL/L (ref 133–144)
TRIGL SERPL-MCNC: 62 MG/DL

## 2021-10-13 PROCEDURE — 80061 LIPID PANEL: CPT | Performed by: PHYSICIAN ASSISTANT

## 2021-10-13 PROCEDURE — 99214 OFFICE O/P EST MOD 30 MIN: CPT | Mod: 25 | Performed by: PHYSICIAN ASSISTANT

## 2021-10-13 PROCEDURE — 93000 ELECTROCARDIOGRAM COMPLETE: CPT | Performed by: PHYSICIAN ASSISTANT

## 2021-10-13 PROCEDURE — 99397 PER PM REEVAL EST PAT 65+ YR: CPT | Performed by: PHYSICIAN ASSISTANT

## 2021-10-13 PROCEDURE — 80048 BASIC METABOLIC PNL TOTAL CA: CPT | Performed by: PHYSICIAN ASSISTANT

## 2021-10-13 PROCEDURE — 36415 COLL VENOUS BLD VENIPUNCTURE: CPT | Performed by: PHYSICIAN ASSISTANT

## 2021-10-13 RX ORDER — LISINOPRIL 40 MG/1
40 TABLET ORAL DAILY
Qty: 90 TABLET | Refills: 1 | Status: SHIPPED | OUTPATIENT
Start: 2021-10-13 | End: 2022-04-12

## 2021-10-13 RX ORDER — METOPROLOL SUCCINATE 100 MG/1
100 TABLET, EXTENDED RELEASE ORAL DAILY
Qty: 90 TABLET | Refills: 1 | Status: SHIPPED | OUTPATIENT
Start: 2021-10-13 | End: 2022-04-12

## 2021-10-13 RX ORDER — SIMVASTATIN 20 MG
20 TABLET ORAL AT BEDTIME
Qty: 90 TABLET | Refills: 1 | Status: SHIPPED | OUTPATIENT
Start: 2021-10-13 | End: 2022-07-26

## 2021-10-13 ASSESSMENT — ENCOUNTER SYMPTOMS
PALPITATIONS: 0
DIARRHEA: 0
PARESTHESIAS: 0
JOINT SWELLING: 0
HEADACHES: 0
SORE THROAT: 0
DYSURIA: 0
MYALGIAS: 0
FEVER: 0
HEMATURIA: 0
ARTHRALGIAS: 1
HEARTBURN: 1
COUGH: 0
NERVOUS/ANXIOUS: 0
CONSTIPATION: 1
WEAKNESS: 0
EYE PAIN: 0
ABDOMINAL PAIN: 0
HEMATOCHEZIA: 0
FREQUENCY: 0
SHORTNESS OF BREATH: 1
DIZZINESS: 0
NAUSEA: 0
CHILLS: 0
BREAST MASS: 0

## 2021-10-13 ASSESSMENT — ACTIVITIES OF DAILY LIVING (ADL): CURRENT_FUNCTION: NO ASSISTANCE NEEDED

## 2021-10-13 NOTE — PROGRESS NOTES
"SUBJECTIVE:   Dora Luna is a 71 year old female who presents for Preventive Visit.      Patient has been advised of split billing requirements and indicates understanding: Yes   Are you in the first 12 months of your Medicare coverage?  No    Healthy Habits:     In general, how would you rate your overall health?  Good    Frequency of exercise:  2-3 days/week    Duration of exercise:  30-45 minutes    Do you usually eat at least 4 servings of fruit and vegetables a day, include whole grains    & fiber and avoid regularly eating high fat or \"junk\" foods?  No    Taking medications regularly:  Yes    Medication side effects:  None    Ability to successfully perform activities of daily living:  No assistance needed    Home Safety:  No safety concerns identified    Hearing Impairment:  Difficulty following a conversation in a noisy restaurant or crowded room, need to ask people to speak up or repeat themselves and difficulty understanding soft or whispered speech    In the past 6 months, have you been bothered by leaking of urine? Yes    In general, how would you rate your overall mental or emotional health?  Good      PHQ-2 Total Score: 1    Additional concerns today:  No    Do you feel safe in your environment? Yes    Have you ever done Advance Care Planning? (For example, a Health Directive, POLST, or a discussion with a medical provider or your loved ones about your wishes): No, advance care planning information given to patient to review.  Patient plans to discuss their wishes with loved ones or provider.         Fall risk  Fallen 2 or more times in the past year?: No  Any fall with injury in the past year?: No    Cognitive Screening   1) Repeat 3 items (Leader, Season, Table)    2) Clock draw: NORMAL  3) 3 item recall: Recalls 1 object   Results: NORMAL clock, 1-2 items recalled: COGNITIVE IMPAIRMENT LESS LIKELY    Mini-CogTM Copyright S Alvaro. Licensed by the author for use in [facility]; " reprinted with permission (akilah@.[email or web address]). All rights reserved.      Do you have sleep apnea, excessive snoring or daytime drowsiness?: no    Reviewed and updated as needed this visit by clinical staff  Tobacco  Allergies  Meds  Problems  Med Hx  Surg Hx  Fam Hx  Soc Hx          Reviewed and updated as needed this visit by Provider   Allergies  Meds  Problems            Social History     Tobacco Use     Smoking status: Never Smoker     Smokeless tobacco: Never Used   Substance Use Topics     Alcohol use: No         Alcohol Use 10/13/2021   Prescreen: >3 drinks/day or >7 drinks/week? Not Applicable       Hyperlipidemia Follow-Up      Are you regularly taking any medication or supplement to lower your cholesterol?   Yes- simvastatin    Are you having muscle aches or other side effects that you think could be caused by your cholesterol lowering medication?  No    Hypertension Follow-up      Do you check your blood pressure regularly outside of the clinic? Yes     Are you following a low salt diet? Yes    Are your blood pressures ever more than 140 on the top number (systolic) OR more   than 90 on the bottom number (diastolic), for example 140/90? Yes  She did not take her blood pressure medications this morning as she is fasting and she states it upsets her stomach when she takes it on empty stomach.    Also on and off the last couple months she has noticed this tightness that wraps around her mid trunk area and causes chest pain.  She states is she mostly notices when they go for a walk.  She denies any nausea vomiting diarrhea headaches or dizziness or shortness of breath.  She is wondering if is coming from her back.  She denies any injuries.  She states she has to go lay down and then her symptoms will resolve.    Current providers sharing in care for this patient include:   Patient Care Team:  Nathaniel Walker PA-C as PCP - General (Physician Assistant)  Nathaniel Walker PA-C as Assigned  PCP    The following health maintenance items are reviewed in Epic and correct as of today:  Health Maintenance Due   Topic Date Due     DEXA  Never done     ANNUAL REVIEW OF HM ORDERS  Never done     MAMMO SCREENING  Never done     Pneumococcal Vaccine: 65+ Years (2 of 2 - PPSV23) 06/14/2019     LIPID  07/31/2021     FALL RISK ASSESSMENT  08/06/2021     INFLUENZA VACCINE (1) Never done     Lab work is in process  Labs reviewed in EPIC  BP Readings from Last 3 Encounters:   10/13/21 (!) 190/92   02/11/21 130/83   08/20/20 132/78    Wt Readings from Last 3 Encounters:   10/13/21 59.2 kg (130 lb 9.6 oz)   02/11/21 59.4 kg (131 lb)   08/06/20 60.3 kg (133 lb)                  Patient Active Problem List   Diagnosis     Advanced directives, counseling/discussion     HTN, goal below 140/90     Benign essential hypertension     At risk for cardiovascular event     High cholesterol     Past Surgical History:   Procedure Laterality Date     APPENDECTOMY         Social History     Tobacco Use     Smoking status: Never Smoker     Smokeless tobacco: Never Used   Substance Use Topics     Alcohol use: No     Family History   Problem Relation Age of Onset     Hypertension Mother      Glaucoma No family hx of      Macular Degeneration No family hx of          Current Outpatient Medications   Medication Sig Dispense Refill     DiphenhydrAMINE HCl (SIMPLY ALLERGY PO) Take by mouth daily as needed       lisinopril (ZESTRIL) 20 MG tablet TAKE 1 TABLET BY MOUTH ONCE DAILY 90 tablet 0     lisinopril (ZESTRIL) 40 MG tablet Take 1 tablet (40 mg) by mouth daily 90 tablet 1     metoprolol succinate ER (TOPROL-XL) 100 MG 24 hr tablet Take 1 tablet (100 mg) by mouth daily 90 tablet 1     simvastatin (ZOCOR) 20 MG tablet Take 1 tablet (20 mg) by mouth At Bedtime 90 tablet 1     fluticasone (FLONASE) 50 MCG/ACT spray Spray 1-2 sprays into both nostrils daily (Patient not taking: Reported on 10/13/2021) 1 Bottle 11     Allergies   Allergen  "Reactions     Norvasc [Amlodipine] Other (See Comments)     Chest pain     Mammogram Screening: Mammogram Screening - Mammography discussed and declined        Review of Systems   Constitutional: Negative for chills and fever.   HENT: Positive for hearing loss. Negative for congestion, ear pain and sore throat.    Eyes: Negative for pain and visual disturbance.   Respiratory: Positive for shortness of breath. Negative for cough.    Cardiovascular: Positive for chest pain. Negative for palpitations and peripheral edema.   Gastrointestinal: Positive for constipation and heartburn. Negative for abdominal pain, diarrhea, hematochezia and nausea.   Breasts:  Negative for tenderness, breast mass and discharge.   Genitourinary: Positive for pelvic pain. Negative for dysuria, frequency, genital sores, hematuria, urgency, vaginal bleeding and vaginal discharge.   Musculoskeletal: Positive for arthralgias. Negative for joint swelling and myalgias.   Skin: Negative for rash.   Neurological: Negative for dizziness, weakness, headaches and paresthesias.   Psychiatric/Behavioral: Negative for mood changes. The patient is not nervous/anxious.      Constitutional, HEENT, cardiovascular, pulmonary, gi and gu systems are negative, except as otherwise noted.    OBJECTIVE:   BP (!) 190/92   Pulse 64   Temp 97.6  F (36.4  C) (Tympanic)   Wt 59.2 kg (130 lb 9.6 oz)   SpO2 96%   BMI 23.89 kg/m   Estimated body mass index is 23.89 kg/m  as calculated from the following:    Height as of 2/11/21: 1.575 m (5' 2\").    Weight as of this encounter: 59.2 kg (130 lb 9.6 oz).  Physical Exam  GENERAL: healthy, alert and no distress  EYES: Eyes grossly normal to inspection, PERRL and conjunctivae and sclerae normal  HENT: ear canals and TM's normal, nose and mouth without ulcers or lesions  NECK: no adenopathy, no asymmetry, masses, or scars and thyroid normal to palpation  RESP: lungs clear to auscultation - no rales, rhonchi or " wheezes  BREAST: deferred  CV: regular rate and rhythm, normal S1 S2, no S3 or S4, no murmur, click or rub, no peripheral edema and peripheral pulses strong  ABDOMEN: soft, nontender, no hepatosplenomegaly, no masses and bowel sounds normal   (female): deferred  MS: no gross musculoskeletal defects noted, no edema  SKIN: no suspicious lesions or rashes  NEURO: Normal strength and tone, mentation intact and speech normal  PSYCH: mentation appears normal, affect normal/bright    Diagnostic Test Results:  Labs reviewed in [facility]  EKG: Sinus  Rhythm  - occasional PAC     # PACs = 1.  -Nonspecific ST depression  -Nondiagnostic.     ASSESSMENT / PLAN:       ICD-10-CM    1. Routine general medical examination at a health care facility  Z00.00    2. High cholesterol  E78.00 Lipid panel reflex to direct LDL Fasting     Lipid panel reflex to direct LDL Fasting     OFFICE/OUTPT VISIT,EST,LEVL IV   3. HTN, goal below 140/90  I10 Basic metabolic panel  (Ca, Cl, CO2, Creat, Gluc, K, Na, BUN)     lisinopril (ZESTRIL) 40 MG tablet     metoprolol succinate ER (TOPROL-XL) 100 MG 24 hr tablet     simvastatin (ZOCOR) 20 MG tablet     Basic metabolic panel  (Ca, Cl, CO2, Creat, Gluc, K, Na, BUN)     OFFICE/OUTPT VISIT,EST,LEVL IV   4. Chest pain, unspecified type  R07.9 EKG 12-lead complete w/read - Clinics     Adult Cardiology Eval Referral     Echocardiogram Exercise Stress     OFFICE/OUTPT VISIT,EST,LEVL IV   5. At risk for cardiovascular event  Z91.89 simvastatin (ZOCOR) 20 MG tablet   6. Encounter for screening mammogram for breast cancer  Z12.31 MA SCREENING DIGITAL BILAT - Future  (s+30)   7. Post-menopausal  Z78.0 DEXA HIP/PELVIS/SPINE - Future   Talk to patient about her concerns.  I am concerned about her chest discomfort.  We will go ahead and get her set up for stress echocardiogram and follow-up with cardiology.  Warning signs were discussed that if they occur if she should go to emergency room.  Labs are pending.   "Follow-up depend on lab results.  Her blood pressure is quite high today we will increase her lisinopril to 40 mg and recheck her blood pressure in 2 weeks.  Warning signs of side effects were discussed.  Patient has been advised of split billing requirements and indicates understanding: Yes  COUNSELING:  Reviewed preventive health counseling, as reflected in patient instructions       Regular exercise       Healthy diet/nutrition       Vision screening       Dental care    Estimated body mass index is 23.89 kg/m  as calculated from the following:    Height as of 2/11/21: 1.575 m (5' 2\").    Weight as of this encounter: 59.2 kg (130 lb 9.6 oz).        She reports that she has never smoked. She has never used smokeless tobacco.      Appropriate preventive services were discussed with this patient, including applicable screening as appropriate for cardiovascular disease, diabetes, osteopenia/osteoporosis, and glaucoma.  As appropriate for age/gender, discussed screening for colorectal cancer, prostate cancer, breast cancer, and cervical cancer. Checklist reviewing preventive services available has been given to the patient.    Reviewed patients plan of care and provided an AVS. The Basic Care Plan (routine screening as documented in Health Maintenance) for Dora meets the Care Plan requirement. This Care Plan has been established and reviewed with the Patient and spouse.    Counseling Resources:  ATP IV Guidelines  Pooled Cohorts Equation Calculator  Breast Cancer Risk Calculator  Breast Cancer: Medication to Reduce Risk  FRAX Risk Assessment  ICSI Preventive Guidelines  Dietary Guidelines for Americans, 2010  USDA's MyPlate  ASA Prophylaxis  Lung CA Screening    JOHN Cat [facility]    Identified Health Risks:  "

## 2021-10-13 NOTE — PATIENT INSTRUCTIONS
Preventive Health Recommendations    See your health care provider every year to    Review health changes.     Discuss preventive care.      Review your medicines if your doctor has prescribed any.      You no longer need a yearly Pap test unless you've had an abnormal Pap test in the past 10 years. If you have vaginal symptoms, such as bleeding or discharge, be sure to talk with your provider about a Pap test.      Every 1 to 2 years, have a mammogram.  If you are over 69, talk with your health care provider about whether or not you want to continue having screening mammograms.      Every 10 years, have a colonoscopy. Or, have a yearly FIT test (stool test). These exams will check for colon cancer.       Have a cholesterol test every 5 years, or more often if your doctor advises it.       Have a diabetes test (fasting glucose) every three years. If you are at risk for diabetes, you should have this test more often.       At age 65, have a bone density scan (DEXA) to check for osteoporosis (brittle bone disease).    Shots:    Get a flu shot each year.    Get a tetanus shot every 10 years.    Talk to your doctor about your pneumonia vaccines. There are now two you should receive - Pneumovax (PPSV 23) and Prevnar (PCV 13).    Talk to your pharmacist about the shingles vaccine.    Talk to your doctor about the hepatitis B vaccine.    Nutrition:     Eat at least 5 servings of fruits and vegetables each day.      Eat whole-grain bread, whole-wheat pasta and brown rice instead of white grains and rice.      Get adequate about Calcium and Vitamin D.     Lifestyle    Exercise at least 150 minutes a week (30 minutes a day, 5 days a week). This will help you control your weight and prevent disease.      Limit alcohol to one drink per day.      No smoking.       Wear sunscreen to prevent skin cancer.       See your dentist twice a year for an exam and cleaning.      See your eye doctor every 1 to 2 years to screen for  conditions such as glaucoma, macular degeneration, cataracts, etc.    Personalized Prevention Plan  You are due for the preventive services outlined below.  Your care team is available to assist you in scheduling these services.  If you have already completed any of these items, please share that information with your care team to update in your medical record.    Health Maintenance Due   Topic Date Due     Osteoporosis Screening  Never done     ANNUAL REVIEW OF HM ORDERS  Never done     Mammogram  Never done     Annual Wellness Visit  Never done     Pneumococcal Vaccine (2 of 2 - PPSV23) 06/14/2019     Discuss Advance Care Planning  02/26/2021     Cholesterol Lab  07/31/2021     FALL RISK ASSESSMENT  08/06/2021     Flu Vaccine (1) Never done

## 2021-10-13 NOTE — LETTER
October 13, 2021      Dora Luna  [address] [facility] [address]        Ms. Luna,     All of your labs were normal/near normal for you.     Resulted Orders   Basic metabolic panel  (Ca, Cl, CO2, Creat, Gluc, K, Na, BUN)   Result Value Ref Range    Sodium 138 133 - 144 mmol/L    Potassium 4.2 3.4 - 5.3 mmol/L    Chloride 104 94 - 109 mmol/L    Carbon Dioxide (CO2) 29 20 - 32 mmol/L    Anion Gap 5 3 - 14 mmol/L    Urea Nitrogen 19 7 - 30 mg/dL    Creatinine 0.71 0.52 - 1.04 mg/dL    Calcium 9.6 8.5 - 10.1 mg/dL    Glucose 98 70 - 99 mg/dL    GFR Estimate 85 >60 mL/min/1.73m2      Comment:      As of July 11, 2021, eGFR is calculated by the CKD-EPI creatinine equation, without race adjustment. eGFR can be influenced by muscle mass, exercise, and diet. The reported eGFR is an estimation only and is only applicable if the renal function is stable.   Lipid panel reflex to direct LDL Fasting   Result Value Ref Range    Cholesterol 182 <200 mg/dL    Triglycerides 62 <150 mg/dL    Direct Measure HDL 95 >=50 mg/dL    LDL Cholesterol Calculated 75 <=100 mg/dL    Non HDL Cholesterol 87 <130 mg/dL    Patient Fasting > 8hrs? Yes     Narrative    Cholesterol  Desirable:  <200 mg/dL    Triglycerides  Normal:  Less than 150 mg/dL  Borderline High:  150-199 mg/dL  High:  200-499 mg/dL  Very High:  Greater than or equal to 500 mg/dL    Direct Measure HDL  Female:  Greater than or equal to 50 mg/dL   Male:  Greater than or equal to 40 mg/dL    LDL Cholesterol  Desirable:  <100mg/dL  Above Desirable:  100-129 mg/dL   Borderline High:  130-159 mg/dL   High:  160-189 mg/dL   Very High:  >= 190 mg/dL    Non HDL Cholesterol  Desirable:  130 mg/dL  Above Desirable:  130-159 mg/dL  Borderline High:  160-189 mg/dL  High:  190-219 mg/dL  Very High:  Greater than or equal to 220 mg/dL       If you have any questions or concerns, please call the clinic at the number listed above.       Sincerely,      Nathaniel Walker,  JOHN

## 2021-10-26 NOTE — PROGRESS NOTES
"  Assessment & Plan       ICD-10-CM    1. HTN, goal below 140/90  I10 hydrochlorothiazide (HYDRODIURIL) 12.5 MG tablet     We will add hydrochlorothiazide 12.5 mg daily to her regimen of blood pressure medications.  Warning signs side effects were discussed.  We will recheck her blood pressure in a couple weeks.    No follow-ups on file.    JOHN [facility]    Luis John is a 71 year old who presents for the following health issues  accompanied by her spouse.    HPI     Hypertension Follow-up      Do you check your blood pressure regularly outside of the clinic? Yes     Are you following a low salt diet? Less salt    Are your blood pressures ever more than 140 on the top number (systolic) OR more   than 90 on the bottom number (diastolic), for example 140/90? Yes      Review of Systems   Constitutional, HEENT, cardiovascular, pulmonary, gi and gu systems are negative, except as otherwise noted.      Objective    BP (!) 166/74   Pulse 62   Resp 17   Ht 1.575 m (5' 2\")   Wt 59.4 kg (131 lb)   SpO2 99%   BMI 23.96 kg/m    Body mass index is 23.96 kg/m .  Physical Exam   GENERAL: healthy, alert and no distress  RESP: lungs clear to auscultation - no rales, rhonchi or wheezes  CV: regular rate and rhythm, normal S1 S2, no S3 or S4, no murmur, click or rub, no peripheral edema and peripheral pulses strong  MS: no gross musculoskeletal defects noted, no edema          "

## 2021-10-27 ENCOUNTER — OFFICE VISIT (OUTPATIENT)
Dept: FAMILY MEDICINE | Facility: CLINIC | Age: 71
End: 2021-10-27
Payer: COMMERCIAL

## 2021-10-27 VITALS
RESPIRATION RATE: 17 BRPM | HEART RATE: 62 BPM | SYSTOLIC BLOOD PRESSURE: 166 MMHG | BODY MASS INDEX: 24.11 KG/M2 | DIASTOLIC BLOOD PRESSURE: 74 MMHG | OXYGEN SATURATION: 99 % | HEIGHT: 62 IN | WEIGHT: 131 LBS

## 2021-10-27 DIAGNOSIS — I10 HTN, GOAL BELOW 140/90: Primary | ICD-10-CM

## 2021-10-27 PROCEDURE — 99213 OFFICE O/P EST LOW 20 MIN: CPT | Performed by: PHYSICIAN ASSISTANT

## 2021-10-27 RX ORDER — HYDROCHLOROTHIAZIDE 12.5 MG/1
12.5 TABLET ORAL DAILY
Qty: 30 TABLET | Refills: 0 | Status: SHIPPED | OUTPATIENT
Start: 2021-10-27 | End: 2022-07-26

## 2021-10-27 ASSESSMENT — MIFFLIN-ST. JEOR: SCORE: 1057.46

## 2021-11-30 NOTE — PROGRESS NOTES
"  Assessment & Plan       ICD-10-CM    1. HTN, goal below 140/90  I10 Comprehensive metabolic panel (BMP + Alb, Alk Phos, ALT, AST, Total. Bili, TP)     amLODIPine (NORVASC) 5 MG tablet     hydrochlorothiazide (HYDRODIURIL) 25 MG tablet     Comprehensive metabolic panel (BMP + Alb, Alk Phos, ALT, AST, Total. Bili, TP)   2. High priority for 2019-nCoV vaccine  Z23 COVID-19,PF,PFIZER (12+ Yrs PURPLE LABEL)   Go ahead and increase your hydrochlorothiazide to 25 mg and add amlodipine 5 mg. She will reports to us in about a week with blood pressure readings from home. Make adjustments as needed. It is unclear why she had a jump in her blood pressure. If we are unable to get this controlled she will follow-up with cardiology for second opinion. Warning signs were discussed.    Return in about 2 weeks (around 12/15/2021) for BP Recheck.    Nathaniel Walker PA-C  [facility]   Dora is a 71 year old who presents for the following health issues  accompanied by her spouse.    HPI     Hypertension Follow-up      Do you check your blood pressure regularly outside of the clinic? Yes     Are you following a low salt diet? No added salt    Are your blood pressures ever more than 140 on the top number (systolic) OR more   than 90 on the bottom number (diastolic), for example 140/90? Yes    Review of Systems   Constitutional, HEENT, cardiovascular, pulmonary, gi and gu systems are negative, except as otherwise noted.      Objective    BP (!) 180/80   Pulse 57   Temp 97.5  F (36.4  C) (Tympanic)   Resp 16   Ht 1.575 m (5' 2\")   Wt 59.4 kg (131 lb)   SpO2 99%   BMI 23.96 kg/m    Body mass index is 23.96 kg/m .  Physical Exam   GENERAL: healthy, alert and no distress  RESP: lungs clear to auscultation - no rales, rhonchi or wheezes  CV: regular rate and rhythm, normal S1 S2, no S3 or S4, no murmur, click or rub, no peripheral edema and peripheral pulses strong  MS: no gross " musculoskeletal defects noted, no edema    Labs pending

## 2021-12-01 ENCOUNTER — OFFICE VISIT (OUTPATIENT)
Dept: FAMILY MEDICINE | Facility: CLINIC | Age: 71
End: 2021-12-01
Payer: COMMERCIAL

## 2021-12-01 VITALS
TEMPERATURE: 97.5 F | DIASTOLIC BLOOD PRESSURE: 80 MMHG | OXYGEN SATURATION: 99 % | HEIGHT: 62 IN | HEART RATE: 57 BPM | RESPIRATION RATE: 16 BRPM | BODY MASS INDEX: 24.11 KG/M2 | SYSTOLIC BLOOD PRESSURE: 180 MMHG | WEIGHT: 131 LBS

## 2021-12-01 DIAGNOSIS — Z23 HIGH PRIORITY FOR 2019-NCOV VACCINE: ICD-10-CM

## 2021-12-01 DIAGNOSIS — I10 HTN, GOAL BELOW 140/90: Primary | ICD-10-CM

## 2021-12-01 LAB
ALBUMIN SERPL-MCNC: 3.7 G/DL (ref 3.4–5)
ALP SERPL-CCNC: 94 U/L (ref 40–150)
ALT SERPL W P-5'-P-CCNC: 24 U/L (ref 0–50)
ANION GAP SERPL CALCULATED.3IONS-SCNC: 1 MMOL/L (ref 3–14)
AST SERPL W P-5'-P-CCNC: 16 U/L (ref 0–45)
BILIRUB SERPL-MCNC: 0.6 MG/DL (ref 0.2–1.3)
BUN SERPL-MCNC: 17 MG/DL (ref 7–30)
CALCIUM SERPL-MCNC: 9.5 MG/DL (ref 8.5–10.1)
CHLORIDE BLD-SCNC: 105 MMOL/L (ref 94–109)
CO2 SERPL-SCNC: 30 MMOL/L (ref 20–32)
CREAT SERPL-MCNC: 0.69 MG/DL (ref 0.52–1.04)
GFR SERPL CREATININE-BSD FRML MDRD: 87 ML/MIN/1.73M2
GLUCOSE BLD-MCNC: 97 MG/DL (ref 70–99)
POTASSIUM BLD-SCNC: 4.4 MMOL/L (ref 3.4–5.3)
PROT SERPL-MCNC: 7.9 G/DL (ref 6.8–8.8)
SODIUM SERPL-SCNC: 136 MMOL/L (ref 133–144)

## 2021-12-01 PROCEDURE — 0004A COVID-19,PF,PFIZER (12+ YRS): CPT | Performed by: PHYSICIAN ASSISTANT

## 2021-12-01 PROCEDURE — 91300 COVID-19,PF,PFIZER (12+ YRS): CPT | Performed by: PHYSICIAN ASSISTANT

## 2021-12-01 PROCEDURE — 99214 OFFICE O/P EST MOD 30 MIN: CPT | Performed by: PHYSICIAN ASSISTANT

## 2021-12-01 PROCEDURE — 80053 COMPREHEN METABOLIC PANEL: CPT | Performed by: PHYSICIAN ASSISTANT

## 2021-12-01 PROCEDURE — 36415 COLL VENOUS BLD VENIPUNCTURE: CPT | Performed by: PHYSICIAN ASSISTANT

## 2021-12-01 RX ORDER — AMLODIPINE BESYLATE 5 MG/1
5 TABLET ORAL DAILY
Qty: 30 TABLET | Refills: 1 | Status: SHIPPED | OUTPATIENT
Start: 2021-12-01 | End: 2022-07-26

## 2021-12-01 RX ORDER — HYDROCHLOROTHIAZIDE 25 MG/1
25 TABLET ORAL DAILY
Qty: 30 TABLET | Refills: 0 | Status: SHIPPED | OUTPATIENT
Start: 2021-12-01 | End: 2022-09-13 | Stop reason: DRUGHIGH

## 2021-12-01 ASSESSMENT — PAIN SCALES - GENERAL: PAINLEVEL: NO PAIN (0)

## 2021-12-01 ASSESSMENT — MIFFLIN-ST. JEOR: SCORE: 1057.46

## 2021-12-01 NOTE — LETTER
December 2, 2021      Dora Luna  [address] [facility] [address]        Dear ,    We are writing to inform you of your test results.    All of your labs were normal/near normal for you.     Please contact the clinic if you have additional questions.        Resulted Orders   Comprehensive metabolic panel (BMP + Alb, Alk Phos, ALT, AST, Total. Bili, TP)   Result Value Ref Range    Sodium 136 133 - 144 mmol/L    Potassium 4.4 3.4 - 5.3 mmol/L    Chloride 105 94 - 109 mmol/L    Carbon Dioxide (CO2) 30 20 - 32 mmol/L    Anion Gap 1 (L) 3 - 14 mmol/L    Urea Nitrogen 17 7 - 30 mg/dL    Creatinine 0.69 0.52 - 1.04 mg/dL    Calcium 9.5 8.5 - 10.1 mg/dL    Glucose 97 70 - 99 mg/dL    Alkaline Phosphatase 94 40 - 150 U/L    AST 16 0 - 45 U/L    ALT 24 0 - 50 U/L    Protein Total 7.9 6.8 - 8.8 g/dL    Albumin 3.7 3.4 - 5.0 g/dL    Bilirubin Total 0.6 0.2 - 1.3 mg/dL    GFR Estimate 87 >60 mL/min/1.73m2      Comment:      As of July 11, 2021, eGFR is calculated by the CKD-EPI creatinine equation, without race adjustment. eGFR can be influenced by muscle mass, exercise, and diet. The reported eGFR is an estimation only and is only applicable if the renal function is stable.       If you have any questions or concerns, please call the clinic at the number listed above.       Sincerely,      Nathaniel Walker PA-C

## 2021-12-10 ENCOUNTER — TELEPHONE (OUTPATIENT)
Dept: FAMILY MEDICINE | Facility: CLINIC | Age: 71
End: 2021-12-10
Payer: COMMERCIAL

## 2021-12-10 NOTE — TELEPHONE ENCOUNTER
"Patient was seen by Nathaniel Walker PA-C 12/1/21.  Patient was to call back and report blood pressure values.   Spouse and patient are both on the phone.   12/2, 135/65  12/3   [id number]  12/4,   159/74  12/5,   160/82  12/6,   145/76  12/7,   177/82.  12/8,   142/68  12/9,  143/78  12/10,  146/71    Patient was prescribe hydrochlorothiazide 25 mg, but developed chest pains stopped that for several days, resumed the medication and symptoms reoccurred for 3-4 hours.  Patient stopped the hydrochlorothiazide for good.  For the last 3 values patient has only been taking the Amlodipine 5 mg .    Discussed importance of contacting clinic when have symptoms have chest pain, other symptoms, Warning symptoms that would need to be seen promptly at Emergency Room or require calling 911 include,  Chest pain, radiating pain, shortness of breath, painful breathing with exertion, nausea, sweating, accelerated heart rate, palpitations, worst headache of your life, calf pain/swelling/redness.        Denies chest pain at this time.  \"I am doing fine now\".      Please advise  Deana Ruiz RN     "

## 2021-12-10 NOTE — TELEPHONE ENCOUNTER
Patient calling with more information about blood pressure.     1. Patient has only been taking Amlodipine 5mg daily. See Blood pressures she called in with earlier today.     2. Patient would like to clarify if she should also be taking lisinopril 40mg and metoprolol 100mg. She stopped those when she started the amplodipine.     3. Patient tried taking hydrochlorothiazide but was experiencing chest pain when she was taking it. She stopped taking it and the chest pain disappeared so she has not resumed taking it.       Anna Prasad RN, BSN  [facility]

## 2021-12-12 NOTE — TELEPHONE ENCOUNTER
She is to be on amlodipine 5 mg daily, lisinopril 40mg daily, metoprolol 100mg daily.     If check pain with hydrochlorothiazide ok to stop or can try to go back to 12.5mg dosage daily.    I would like her to follow up  With cardiology.    Thanks,  Nathaniel Walker PA-C

## 2021-12-13 NOTE — TELEPHONE ENCOUNTER
Patient/parent is informed of MD note below, as it is written. Verbalized good understanding.    12/11/21 134/77 only taking the Amlodipine.      Phone number Cardiologist is provided.     Please advise, continue with all medication in light of blood pressure on Saturday.    Deana Ruiz RN

## 2021-12-13 NOTE — TELEPHONE ENCOUNTER
That is a drastic difference in blood pressure readings.   Maybe have her have her blood pressure check with pharmacy or ancillary with only being on amlodipine.   Have her bring her blood pressure monitor in to check it with ours.     Thanks  Nathaniel Walker PA-C

## 2021-12-13 NOTE — TELEPHONE ENCOUNTER
Patient notified of provider's message as written below. She has an appointment Wednesday with the cardiologist will have her blood pressure checked there and will compare her machine there to see if it is comparable. Patient verbalized good understanding, had no further questions and needed no further support.Pham Knight R.N.

## 2021-12-14 NOTE — PROGRESS NOTES
CARDIOLOGY CONSULT    REASON FOR CONSULT: hypertension      PRIMARY CARE PHYSICIAN:  Nathaniel Walker    HISTORY OF PRESENT ILLNESS:  Ms. Luna is a 72 y/o woman with PMH significant for hypertension who presents to clinic today for evaluation of the same.  Dora follows with Nathaniel BARBOUR in primary care clinic.  She was noted to be very hypertensive at several clinic visits.  She had adjustments with her BP medications, however, there was quite a bit of confusion for her regarding what she was supposed to be taking and in fact, stopped her other medications when she was prescribed hydrochlorothiazide.   She states when taking this medication, she noted a substernal chest tightness that lasted for a number of hours.  She stopped taking this medication and when she tried adding it back, the symptoms again recurred.  She is currently taking metoprolol  mg daily, lisinopril 40 mg daily and amlodipine 5 mg daily.  She states she had a similar reaction to amlodipine in the past-and was actually surprised that this was once of the medications she is prescribed and taking.  Her  acknowledges these are the medications she is taking.  Her BP has been check at home and most recently (since taking all three medications) has been mostly in the 130's systolic range.  Her BP here was 130/64 (and BP on home machine was 147 systolic).  She does note some light-headedness if she bends over and stands up to quickly.  She does not get regular exercise, but is active in the home cooking/cleaning, helping her  in the yard.  She denies any exertional chest pain, chest discomfort or shortness of breath.  She is otherwise without complaints.            PAST MEDICAL HISTORY:  1.  Hypertension  2.  Hyperlipidemia    MEDICATIONS:  Current Outpatient Medications   Medication     amLODIPine (NORVASC) 5 MG tablet     DiphenhydrAMINE HCl (SIMPLY ALLERGY PO)     fluticasone (FLONASE) 50 MCG/ACT spray      hydrochlorothiazide (HYDRODIURIL) 12.5 MG tablet     hydrochlorothiazide (HYDRODIURIL) 25 MG tablet     lisinopril (ZESTRIL) 40 MG tablet     metoprolol succinate ER (TOPROL-XL) 100 MG 24 hr tablet     simvastatin (ZOCOR) 20 MG tablet     No current facility-administered medications for this visit.       ALLERGIES:  Allergies   Allergen Reactions     Norvasc [Amlodipine] Other (See Comments)     Chest pain       SOCIAL HISTORY:  Nonsmoker, no significant ETOH    FAMILY HISTORY:  I have reviewed this patient's family history and updated it with pertinent information if needed.   Family History   Problem Relation Age of Onset     Hypertension Mother      Glaucoma No family hx of      Macular Degeneration No family hx of        REVIEW OF SYSTEMS:  A complete ROS was obtained and the pertinent positives are outlined in the history of present illness above.  The remainder of systems is negative.      PHYSICAL EXAM:                     Vital Signs with Ranges     0 lbs 0 oz    Constitutional: awake, alert, no distress  Eyes: PERRL, sclera nonicteric  ENT: trachea midline  Respiratory: CTAB  Cardiovascular: RRR no m/r/g, no JVD  GI: nondistended, nontender, bowel sounds present  Lymph/Hematologic: no lymphadenopathy  Skin: dry, no rash  Musculoskeletal: good muscle tone, no edema bilaterally  Neurologic: no focal deficits  Neuropsychiatric: appropriate affact    DATA:      EKG: Dated 12/15/21 images reviewed personally.  Normal sinus rhythm with nonspecific ST segment changes        ASSESSMENT:  1.  Hypertension:  Improved control.   BP management recently complicated by miscommunication regarding dosing (and patient was therefore not taking all medications as prescribed)  2.  Hyperlipidemia  3.  Chest pain:  Atypical for cardiac etiology.  ECG today with normal sinus rhythm with nonspecific ST segment changes      RECOMMENDATIONS:  1.  Reviewed BP management and goals.  BP now improved while on three antihypertensive  agents which she is taking regularly.  No changes made today.  BP monitor at home running quite a bit higher than in clinic today and she was given a list of approved BP monitors to use.  2.  Given atypical chest pain and risk factors for CAD, recommend further risk stratification with exercise stress echocardiogram (as previously ordered)  3.  Plan for follow up in 6 weeks to discuss above results and reassess BP.       Christiana Moya MD [facility]  December 15, 2021      Today's clinic visit entailed:  Ordering of each unique test  30 minutes spent on the date of the encounter doing chart review, history and exam, documentation and further activities per the note  Provider  Link to MDM Help Grid     The level of medical decision making during this visit was of moderate complexity.

## 2021-12-15 ENCOUNTER — HOSPITAL ENCOUNTER (OUTPATIENT)
Dept: CARDIOLOGY | Facility: CLINIC | Age: 71
Discharge: HOME | End: 2021-12-15
Attending: INTERNAL MEDICINE | Admitting: INTERNAL MEDICINE
Payer: COMMERCIAL

## 2021-12-15 ENCOUNTER — OFFICE VISIT (OUTPATIENT)
Dept: CARDIOLOGY | Facility: CLINIC | Age: 71
End: 2021-12-15
Attending: PHYSICIAN ASSISTANT
Payer: COMMERCIAL

## 2021-12-15 VITALS
SYSTOLIC BLOOD PRESSURE: 130 MMHG | BODY MASS INDEX: 24.14 KG/M2 | DIASTOLIC BLOOD PRESSURE: 64 MMHG | HEART RATE: 61 BPM | OXYGEN SATURATION: 97 % | HEIGHT: 62 IN | WEIGHT: 131.2 LBS

## 2021-12-15 DIAGNOSIS — I10 BENIGN ESSENTIAL HYPERTENSION: Primary | ICD-10-CM

## 2021-12-15 DIAGNOSIS — R07.9 CHEST PAIN, UNSPECIFIED TYPE: ICD-10-CM

## 2021-12-15 PROCEDURE — 93005 ELECTROCARDIOGRAM TRACING: CPT | Performed by: REHABILITATION PRACTITIONER

## 2021-12-15 PROCEDURE — 99204 OFFICE O/P NEW MOD 45 MIN: CPT | Performed by: INTERNAL MEDICINE

## 2021-12-15 PROCEDURE — 93010 ELECTROCARDIOGRAM REPORT: CPT | Performed by: INTERNAL MEDICINE

## 2021-12-15 ASSESSMENT — MIFFLIN-ST. JEOR: SCORE: 1058.37

## 2021-12-15 NOTE — PATIENT INSTRUCTIONS
-Exercise stress echocardiogram   -Continue current medications; no changes made today  -Follow up with Dr. Moya in 6 weeks

## 2021-12-15 NOTE — LETTER
12/15/2021    Nathaniel Walker PA-C  [address] [facility] [id number]    RE: Dora Luna       Dear Colleague,     I had the pleasure of seeing Dora Luna in the [facility].  CARDIOLOGY CONSULT    REASON FOR CONSULT: hypertension      PRIMARY CARE PHYSICIAN:  Nathaniel Walker    HISTORY OF PRESENT ILLNESS:  Ms. Luna is a 70 y/o woman with PMH significant for hypertension who presents to clinic today for evaluation of the same.  Dora follows with Nathaniel BARBOUR in primary care clinic.  She was noted to be very hypertensive at several clinic visits.  She had adjustments with her BP medications, however, there was quite a bit of confusion for her regarding what she was supposed to be taking and in fact, stopped her other medications when she was prescribed hydrochlorothiazide.   She states when taking this medication, she noted a substernal chest tightness that lasted for a number of hours.  She stopped taking this medication and when she tried adding it back, the symptoms again recurred.  She is currently taking metoprolol  mg daily, lisinopril 40 mg daily and amlodipine 5 mg daily.  She states she had a similar reaction to amlodipine in the past-and was actually surprised that this was once of the medications she is prescribed and taking.  Her  acknowledges these are the medications she is taking.  Her BP has been check at home and most recently (since taking all three medications) has been mostly in the 130's systolic range.  Her BP here was 130/64 (and BP on home machine was 147 systolic).  She does note some light-headedness if she bends over and stands up to quickly.  She does not get regular exercise, but is active in the home cooking/cleaning, helping her  in the yard.  She denies any exertional chest pain, chest discomfort or shortness of breath.  She is otherwise without complaints.            PAST MEDICAL HISTORY:  1.  Hypertension  2.   Hyperlipidemia    MEDICATIONS:  Current Outpatient Medications   Medication     amLODIPine (NORVASC) 5 MG tablet     DiphenhydrAMINE HCl (SIMPLY ALLERGY PO)     fluticasone (FLONASE) 50 MCG/ACT spray     hydrochlorothiazide (HYDRODIURIL) 12.5 MG tablet     hydrochlorothiazide (HYDRODIURIL) 25 MG tablet     lisinopril (ZESTRIL) 40 MG tablet     metoprolol succinate ER (TOPROL-XL) 100 MG 24 hr tablet     simvastatin (ZOCOR) 20 MG tablet     No current facility-administered medications for this visit.       ALLERGIES:  Allergies   Allergen Reactions     Norvasc [Amlodipine] Other (See Comments)     Chest pain       SOCIAL HISTORY:  Nonsmoker, no significant ETOH    FAMILY HISTORY:  I have reviewed this patient's family history and updated it with pertinent information if needed.   Family History   Problem Relation Age of Onset     Hypertension Mother      Glaucoma No family hx of      Macular Degeneration No family hx of        REVIEW OF SYSTEMS:  A complete ROS was obtained and the pertinent positives are outlined in the history of present illness above.  The remainder of systems is negative.      PHYSICAL EXAM:                     Vital Signs with Ranges     0 lbs 0 oz    Constitutional: awake, alert, no distress  Eyes: PERRL, sclera nonicteric  ENT: trachea midline  Respiratory: CTAB  Cardiovascular: RRR no m/r/g, no JVD  GI: nondistended, nontender, bowel sounds present  Lymph/Hematologic: no lymphadenopathy  Skin: dry, no rash  Musculoskeletal: good muscle tone, no edema bilaterally  Neurologic: no focal deficits  Neuropsychiatric: appropriate affact    DATA:      EKG: Dated 12/15/21 images reviewed personally.  Normal sinus rhythm with nonspecific ST segment changes        ASSESSMENT:  1.  Hypertension:  Improved control.   BP management recently complicated by miscommunication regarding dosing (and patient was therefore not taking all medications as prescribed)  2.  Hyperlipidemia  3.  Chest pain:  Atypical for  cardiac etiology.  ECG today with normal sinus rhythm with nonspecific ST segment changes      RECOMMENDATIONS:  1.  Reviewed BP management and goals.  BP now improved while on three antihypertensive agents which she is taking regularly.  No changes made today.  BP monitor at home running quite a bit higher than in clinic today and she was given a list of approved BP monitors to use.  2.  Given atypical chest pain and risk factors for CAD, recommend further risk stratification with exercise stress echocardiogram (as previously ordered)  3.  Plan for follow up in 6 weeks to discuss above results and reassess BP.       Christiana Moya MD [facility]  December 15, 2021      Today's clinic visit entailed:  Ordering of each unique test  30 minutes spent on the date of the encounter doing chart review, history and exam, documentation and further activities per the note  Provider  Link to [facility] Help Grid     The level of medical decision making during this visit was of moderate complexity.    Thank you for allowing me to participate in the care of your patient.    Sincerely,   Christiana Moya MD     [facility]  cc:   Nathaniel Walker PA-C  [address] [place] [address]

## 2022-05-13 ENCOUNTER — TELEPHONE (OUTPATIENT)
Dept: FAMILY MEDICINE | Facility: CLINIC | Age: 72
End: 2022-05-13
Payer: COMMERCIAL

## 2022-05-13 NOTE — TELEPHONE ENCOUNTER
Patient Quality Outreach    Patient is due for the following:   Colon Cancer Screening -  Colonoscopy  Breast Cancer Screening - Mammogram    NEXT STEPS:   Schedule a office visit for Mammogram     Type of outreach:    Sent letter.    Next Steps:  Reach out within 90 days via Letter.    Max number of attempts reached: No. Will try again in 90 days if patient still on fail list.    Questions for provider review:    None     Josefina Son MA

## 2022-05-13 NOTE — LETTER
May 13, 2022      Dora Luna  [address] [address]      Your healthcare team cares about your health. To provide you with the best care,   we have reviewed your chart and based on our findings, we see that you are due to:     - BREAST CANCER SCREENING:  Schedule Annual Mammogram. [facility] [place] scheduling number - [phone] or schedule in MyChart (self referall)  - COLON CANCER SCREENING:  Call or mychart the clinic to schedule your colonoscopy or schedule/ your FIT Test, or Cologuard test    If you have already completed these items, please contact the clinic via phone or   [email or web address] so your care team can review and update your records. Thank you for   choosing [facility] for your healthcare needs. For any questions,   concerns, or to schedule an appointment please contact the clinic.       Healthy Regards,      Your [facility] Care Team

## 2022-07-26 ENCOUNTER — OFFICE VISIT (OUTPATIENT)
Dept: FAMILY MEDICINE | Facility: CLINIC | Age: 72
End: 2022-07-26
Payer: COMMERCIAL

## 2022-07-26 VITALS
DIASTOLIC BLOOD PRESSURE: 80 MMHG | HEART RATE: 56 BPM | SYSTOLIC BLOOD PRESSURE: 180 MMHG | OXYGEN SATURATION: 97 % | HEIGHT: 62 IN | BODY MASS INDEX: 23.55 KG/M2 | RESPIRATION RATE: 14 BRPM | WEIGHT: 128 LBS | TEMPERATURE: 97.3 F

## 2022-07-26 DIAGNOSIS — Z91.89 AT RISK FOR CARDIOVASCULAR EVENT: ICD-10-CM

## 2022-07-26 DIAGNOSIS — I10 HTN, GOAL BELOW 140/90: Primary | ICD-10-CM

## 2022-07-26 DIAGNOSIS — Z12.12 SCREENING FOR MALIGNANT NEOPLASM OF THE RECTUM: ICD-10-CM

## 2022-07-26 DIAGNOSIS — E78.00 HIGH CHOLESTEROL: ICD-10-CM

## 2022-07-26 DIAGNOSIS — Z23 HIGH PRIORITY FOR 2019-NCOV VACCINE: ICD-10-CM

## 2022-07-26 LAB
ANION GAP SERPL CALCULATED.3IONS-SCNC: 7 MMOL/L (ref 3–14)
BUN SERPL-MCNC: 15 MG/DL (ref 7–30)
CALCIUM SERPL-MCNC: 9.7 MG/DL (ref 8.5–10.1)
CHLORIDE BLD-SCNC: 104 MMOL/L (ref 94–109)
CHOLEST SERPL-MCNC: 193 MG/DL
CO2 SERPL-SCNC: 29 MMOL/L (ref 20–32)
CREAT SERPL-MCNC: 0.82 MG/DL (ref 0.52–1.04)
FASTING STATUS PATIENT QL REPORTED: YES
GFR SERPL CREATININE-BSD FRML MDRD: 75 ML/MIN/1.73M2
GLUCOSE BLD-MCNC: 96 MG/DL (ref 70–99)
HDLC SERPL-MCNC: 87 MG/DL
LDLC SERPL CALC-MCNC: 91 MG/DL
NONHDLC SERPL-MCNC: 106 MG/DL
POTASSIUM BLD-SCNC: 4.2 MMOL/L (ref 3.4–5.3)
SODIUM SERPL-SCNC: 140 MMOL/L (ref 133–144)
TRIGL SERPL-MCNC: 77 MG/DL

## 2022-07-26 PROCEDURE — 80061 LIPID PANEL: CPT | Performed by: PHYSICIAN ASSISTANT

## 2022-07-26 PROCEDURE — 80048 BASIC METABOLIC PNL TOTAL CA: CPT | Performed by: PHYSICIAN ASSISTANT

## 2022-07-26 PROCEDURE — 91305 COVID-19,PF,PFIZER (12+ YRS): CPT | Performed by: PHYSICIAN ASSISTANT

## 2022-07-26 PROCEDURE — 36415 COLL VENOUS BLD VENIPUNCTURE: CPT | Performed by: PHYSICIAN ASSISTANT

## 2022-07-26 PROCEDURE — 0054A COVID-19,PF,PFIZER (12+ YRS): CPT | Performed by: PHYSICIAN ASSISTANT

## 2022-07-26 PROCEDURE — 99213 OFFICE O/P EST LOW 20 MIN: CPT | Mod: 25 | Performed by: PHYSICIAN ASSISTANT

## 2022-07-26 RX ORDER — HYDROCHLOROTHIAZIDE 12.5 MG/1
12.5 TABLET ORAL DAILY
Qty: 90 TABLET | Refills: 0 | Status: SHIPPED | OUTPATIENT
Start: 2022-07-26 | End: 2022-09-13

## 2022-07-26 RX ORDER — SIMVASTATIN 20 MG
20 TABLET ORAL AT BEDTIME
Qty: 90 TABLET | Refills: 1 | Status: SHIPPED | OUTPATIENT
Start: 2022-07-26 | End: 2023-02-15

## 2022-07-26 ASSESSMENT — PAIN SCALES - GENERAL: PAINLEVEL: NO PAIN (0)

## 2022-07-26 NOTE — PROGRESS NOTES
Assessment & Plan       ICD-10-CM    1. HTN, goal below 140/90  I10 hydrochlorothiazide (HYDRODIURIL) 12.5 MG tablet     simvastatin (ZOCOR) 20 MG tablet     Basic metabolic panel  (Ca, Cl, CO2, Creat, Gluc, K, Na, BUN)     Basic metabolic panel  (Ca, Cl, CO2, Creat, Gluc, K, Na, BUN)   2. At risk for cardiovascular event  Z91.89 simvastatin (ZOCOR) 20 MG tablet   3. High cholesterol  E78.00 Lipid panel reflex to direct LDL Fasting     Lipid panel reflex to direct LDL Fasting   4. Screening for malignant neoplasm of the rectum  Z12.12 COLOGUARD([employer])   5. High priority for 2019-nCoV vaccine  Z23 COVID-19,PF,PFIZER (12+ Yrs GRAY LABEL)   Talk to patient about her concerns at this point working to try to add a lower dose hydrochlorothiazide to her blood pressure regimen.  She is going to follow back up with her cardiologist that she is due for recheck.  It is unclear at this time why her blood pressure readings are so high.  Warning signs were discussed and they should occur she will emergency room.  Labs are pending follow-up depend on lab results otherwise we will recheck her blood pressure in 4 weeks      Return in about 4 weeks (around 8/23/2022) for BP Recheck.    Nathaniel Walker PA-C  [facility]   Dora is a 72 year old accompanied by her spouse, presenting for the following health issues:  Diabetes, Hypertension, and Imm/Inj (COVID-19 VACCINE)      History of Present Illness       Hypertension: She presents for follow up of hypertension.  She does check blood pressure  regularly outside of the clinic. Outside blood pressures have been over 140/90. She follows a low salt diet.    Dora continues to have elevated blood pressure readings.  She states that high at home also.  She has seen cardiologist in the past.  She states she started her on amlodipine but she developed chest pain so she stopped it.  She also has been on hydrochlorothiazide in the past  "which gave her similar symptoms.  She currently denies any chest pain or shortness of breath or exertional symptoms.    Hyperlipidemia Follow-Up      Are you regularly taking any medication or supplement to lower your cholesterol?   Yes- simvastatin    Are you having muscle aches or other side effects that you think could be caused by your cholesterol lowering medication?  No      Review of Systems   Constitutional, HEENT, cardiovascular, pulmonary, gi and gu systems are negative, except as otherwise noted.      Objective    BP (!) 180/80   Pulse 56   Temp 97.3  F (36.3  C) (Tympanic)   Resp 14   Ht 1.575 m (5' 2\")   Wt 58.1 kg (128 lb)   SpO2 97%   BMI 23.41 kg/m    Body mass index is 23.41 kg/m .  Physical Exam   GENERAL: healthy, alert and no distress  NECK: no adenopathy, no asymmetry, masses, or scars and thyroid normal to palpation  RESP: lungs clear to auscultation - no rales, rhonchi or wheezes  CV: regular rate and rhythm, normal S1 S2, no S3 or S4, no murmur, click or rub, no peripheral edema and peripheral pulses strong  ABDOMEN: soft, nontender, no hepatosplenomegaly, no masses and bowel sounds normal  MS: no gross musculoskeletal defects noted, no edema    Labs pending        "

## 2022-08-31 LAB — NONINV COLON CA DNA+OCC BLD SCRN STL QL: NEGATIVE

## 2022-09-12 NOTE — PROGRESS NOTES
"  Assessment & Plan       ICD-10-CM    1. HTN, goal below 140/90  I10 hydrochlorothiazide (HYDRODIURIL) 12.5 MG tablet     medical conditions are stable. meds refilled.  Work on Healthy diet and exercise. Getting heart rate elevated for 30 mins most days of week.      Return in about 6 months (around 3/13/2023) for recheck.    [facility]    Luis John is a 72 year old accompanied by her spouse, presenting for the following health issues:  Hypertension      History of Present Illness       Hypertension: She presents for follow up of hypertension.  She does check blood pressure  regularly outside of the clinic. Outside blood pressures have been over 140/90. She follows a low salt diet.     No chest pain or short of breath. No headache or dizziness.   Getting normal blood pressure readings at home.   She is here for routine blood pressure management.  She has been taking Toprol 5 mg hydrochlorothiazide and she states she is feeling much better.  She denies any chest pain or shortness of breath headaches or dizziness.  We have been struggling to get her blood pressure under control.    Review of Systems   Constitutional, HEENT, cardiovascular, pulmonary, gi and gu systems are negative, except as otherwise noted.      Objective    BP (!) 144/80   Pulse 57   Temp (!) 96.5  F (35.8  C) (Tympanic)   Resp 14   Ht 1.575 m (5' 2\")   Wt 59.4 kg (131 lb)   SpO2 98%   BMI 23.96 kg/m    Body mass index is 23.96 kg/m .  Physical Exam   GENERAL: healthy, alert and no distress  NECK: no adenopathy, no asymmetry, masses, or scars and thyroid normal to palpation  RESP: lungs clear to auscultation - no rales, rhonchi or wheezes  CV: regular rate and rhythm, normal S1 S2, no S3 or S4, no murmur, click or rub, no peripheral edema and peripheral pulses strong  ABDOMEN: soft, nontender, no hepatosplenomegaly, no masses and bowel sounds normal  MS: no gross musculoskeletal " defects noted, no edema

## 2022-09-13 ENCOUNTER — OFFICE VISIT (OUTPATIENT)
Dept: FAMILY MEDICINE | Facility: CLINIC | Age: 72
End: 2022-09-13
Payer: COMMERCIAL

## 2022-09-13 VITALS
WEIGHT: 131 LBS | RESPIRATION RATE: 14 BRPM | OXYGEN SATURATION: 98 % | TEMPERATURE: 96.5 F | DIASTOLIC BLOOD PRESSURE: 80 MMHG | SYSTOLIC BLOOD PRESSURE: 144 MMHG | HEART RATE: 57 BPM | HEIGHT: 62 IN | BODY MASS INDEX: 24.11 KG/M2

## 2022-09-13 DIAGNOSIS — I10 HTN, GOAL BELOW 140/90: ICD-10-CM

## 2022-09-13 PROCEDURE — 99213 OFFICE O/P EST LOW 20 MIN: CPT | Performed by: PHYSICIAN ASSISTANT

## 2022-09-13 RX ORDER — HYDROCHLOROTHIAZIDE 12.5 MG/1
12.5 TABLET ORAL DAILY
Qty: 90 TABLET | Refills: 0 | Status: SHIPPED | OUTPATIENT
Start: 2022-09-13 | End: 2023-01-17

## 2022-09-13 RX ORDER — ISOSORBIDE MONONITRATE 30 MG/1
30 TABLET, EXTENDED RELEASE ORAL DAILY
Qty: 30 TABLET | Refills: 0 | Status: CANCELLED | OUTPATIENT
Start: 2022-09-13

## 2022-09-13 ASSESSMENT — PAIN SCALES - GENERAL: PAINLEVEL: NO PAIN (0)

## 2022-09-23 ENCOUNTER — TELEPHONE (OUTPATIENT)
Dept: FAMILY MEDICINE | Facility: CLINIC | Age: 72
End: 2022-09-23

## 2022-09-23 NOTE — TELEPHONE ENCOUNTER
Patient Quality Outreach    Patient is due for the following:   Hypertension -  BP check    Next Steps:   patient was just seen, per Nathaniel Walker note patient is not due back until 03/2023    Type of outreach:    none needed      Questions for provider review:    None     Josefina Son MA

## 2022-10-23 DIAGNOSIS — I10 HTN, GOAL BELOW 140/90: ICD-10-CM

## 2022-10-25 RX ORDER — METOPROLOL SUCCINATE 100 MG/1
TABLET, EXTENDED RELEASE ORAL
Qty: 90 TABLET | Refills: 0 | Status: SHIPPED | OUTPATIENT
Start: 2022-10-25 | End: 2023-01-17

## 2022-10-25 RX ORDER — LISINOPRIL 40 MG/1
TABLET ORAL
Qty: 90 TABLET | Refills: 0 | Status: SHIPPED | OUTPATIENT
Start: 2022-10-25 | End: 2023-01-17

## 2023-01-17 DIAGNOSIS — I10 HTN, GOAL BELOW 140/90: ICD-10-CM

## 2023-01-17 RX ORDER — HYDROCHLOROTHIAZIDE 12.5 MG/1
TABLET ORAL
Qty: 90 TABLET | Refills: 0 | Status: SHIPPED | OUTPATIENT
Start: 2023-01-17 | End: 2023-02-15

## 2023-01-17 RX ORDER — METOPROLOL SUCCINATE 100 MG/1
TABLET, EXTENDED RELEASE ORAL
Qty: 90 TABLET | Refills: 0 | Status: SHIPPED | OUTPATIENT
Start: 2023-01-17 | End: 2023-02-15

## 2023-01-17 RX ORDER — LISINOPRIL 40 MG/1
TABLET ORAL
Qty: 90 TABLET | Refills: 0 | Status: SHIPPED | OUTPATIENT
Start: 2023-01-17 | End: 2023-02-15

## 2023-02-15 ENCOUNTER — OFFICE VISIT (OUTPATIENT)
Dept: FAMILY MEDICINE | Facility: CLINIC | Age: 73
End: 2023-02-15
Payer: COMMERCIAL

## 2023-02-15 VITALS
BODY MASS INDEX: 24.29 KG/M2 | HEIGHT: 62 IN | WEIGHT: 132 LBS | DIASTOLIC BLOOD PRESSURE: 80 MMHG | SYSTOLIC BLOOD PRESSURE: 160 MMHG | OXYGEN SATURATION: 100 % | RESPIRATION RATE: 14 BRPM | TEMPERATURE: 96 F | HEART RATE: 64 BPM

## 2023-02-15 DIAGNOSIS — I10 HTN, GOAL BELOW 140/90: ICD-10-CM

## 2023-02-15 DIAGNOSIS — Z78.0 POST-MENOPAUSAL: ICD-10-CM

## 2023-02-15 DIAGNOSIS — Z12.31 VISIT FOR SCREENING MAMMOGRAM: ICD-10-CM

## 2023-02-15 DIAGNOSIS — Z91.89 AT RISK FOR CARDIOVASCULAR EVENT: ICD-10-CM

## 2023-02-15 DIAGNOSIS — Z00.00 ENCOUNTER FOR MEDICARE ANNUAL WELLNESS EXAM: Primary | ICD-10-CM

## 2023-02-15 DIAGNOSIS — E78.00 HIGH CHOLESTEROL: ICD-10-CM

## 2023-02-15 LAB
ALBUMIN SERPL-MCNC: 4 G/DL (ref 3.4–5)
ALP SERPL-CCNC: 69 U/L (ref 40–150)
ALT SERPL W P-5'-P-CCNC: 23 U/L (ref 0–50)
ANION GAP SERPL CALCULATED.3IONS-SCNC: 6 MMOL/L (ref 3–14)
AST SERPL W P-5'-P-CCNC: 15 U/L (ref 0–45)
BILIRUB SERPL-MCNC: 0.5 MG/DL (ref 0.2–1.3)
BUN SERPL-MCNC: 19 MG/DL (ref 7–30)
CALCIUM SERPL-MCNC: 9.7 MG/DL (ref 8.5–10.1)
CHLORIDE BLD-SCNC: 105 MMOL/L (ref 94–109)
CHOLEST SERPL-MCNC: 170 MG/DL
CO2 SERPL-SCNC: 32 MMOL/L (ref 20–32)
CREAT SERPL-MCNC: 0.73 MG/DL (ref 0.52–1.04)
FASTING STATUS PATIENT QL REPORTED: YES
GFR SERPL CREATININE-BSD FRML MDRD: 86 ML/MIN/1.73M2
GLUCOSE BLD-MCNC: 100 MG/DL (ref 70–99)
HDLC SERPL-MCNC: 90 MG/DL
LDLC SERPL CALC-MCNC: 65 MG/DL
NONHDLC SERPL-MCNC: 80 MG/DL
POTASSIUM BLD-SCNC: 4 MMOL/L (ref 3.4–5.3)
PROT SERPL-MCNC: 7.8 G/DL (ref 6.8–8.8)
SODIUM SERPL-SCNC: 143 MMOL/L (ref 133–144)
TRIGL SERPL-MCNC: 73 MG/DL

## 2023-02-15 PROCEDURE — 80061 LIPID PANEL: CPT | Performed by: PHYSICIAN ASSISTANT

## 2023-02-15 PROCEDURE — 0124A COVID-19 VACCINE BIVALENT BOOSTER 12+ (PFIZER): CPT | Performed by: PHYSICIAN ASSISTANT

## 2023-02-15 PROCEDURE — 91312 COVID-19 VACCINE BIVALENT BOOSTER 12+ (PFIZER): CPT | Performed by: PHYSICIAN ASSISTANT

## 2023-02-15 PROCEDURE — 36415 COLL VENOUS BLD VENIPUNCTURE: CPT | Performed by: PHYSICIAN ASSISTANT

## 2023-02-15 PROCEDURE — G0438 PPPS, INITIAL VISIT: HCPCS | Performed by: PHYSICIAN ASSISTANT

## 2023-02-15 PROCEDURE — 80053 COMPREHEN METABOLIC PANEL: CPT | Performed by: PHYSICIAN ASSISTANT

## 2023-02-15 PROCEDURE — 99214 OFFICE O/P EST MOD 30 MIN: CPT | Mod: 25 | Performed by: PHYSICIAN ASSISTANT

## 2023-02-15 RX ORDER — SIMVASTATIN 20 MG
20 TABLET ORAL AT BEDTIME
Qty: 90 TABLET | Refills: 1 | Status: SHIPPED | OUTPATIENT
Start: 2023-02-15 | End: 2023-10-12

## 2023-02-15 RX ORDER — METOPROLOL SUCCINATE 100 MG/1
100 TABLET, EXTENDED RELEASE ORAL DAILY
Qty: 90 TABLET | Refills: 1 | Status: SHIPPED | OUTPATIENT
Start: 2023-02-15 | End: 2023-10-12

## 2023-02-15 RX ORDER — HYDROCHLOROTHIAZIDE 12.5 MG/1
12.5 TABLET ORAL DAILY
Qty: 90 TABLET | Refills: 1 | Status: SHIPPED | OUTPATIENT
Start: 2023-02-15 | End: 2023-10-12

## 2023-02-15 RX ORDER — LISINOPRIL 40 MG/1
40 TABLET ORAL DAILY
Qty: 90 TABLET | Refills: 1 | Status: SHIPPED | OUTPATIENT
Start: 2023-02-15 | End: 2023-10-12

## 2023-02-15 ASSESSMENT — ENCOUNTER SYMPTOMS
HEADACHES: 0
PARESTHESIAS: 0
HEARTBURN: 1
ARTHRALGIAS: 1
HEMATOCHEZIA: 0
NERVOUS/ANXIOUS: 0
COUGH: 0
WEAKNESS: 0
CONSTIPATION: 1
DIZZINESS: 0
DIARRHEA: 0
FEVER: 0
DYSURIA: 0
SHORTNESS OF BREATH: 0
HEMATURIA: 0
PALPITATIONS: 0
EYE PAIN: 0
BREAST MASS: 0
SORE THROAT: 0
MYALGIAS: 0
ABDOMINAL PAIN: 0
NAUSEA: 0
CHILLS: 0
FREQUENCY: 0
JOINT SWELLING: 0

## 2023-02-15 ASSESSMENT — PAIN SCALES - GENERAL: PAINLEVEL: EXTREME PAIN (8)

## 2023-02-15 ASSESSMENT — ACTIVITIES OF DAILY LIVING (ADL): CURRENT_FUNCTION: NO ASSISTANCE NEEDED

## 2023-02-15 NOTE — LETTER
February 15, 2023      Dora Luna  [address] [facility] [address]        Dear ,    We are writing to inform you of your test results.    All of your labs were normal/near normal for you.     Please contact the clinic if you have additional questions.  Thank you.       Resulted Orders   Lipid panel reflex to direct LDL Fasting   Result Value Ref Range    Cholesterol 170 <200 mg/dL    Triglycerides 73 <150 mg/dL    Direct Measure HDL 90 >=50 mg/dL    LDL Cholesterol Calculated 65 <=100 mg/dL    Non HDL Cholesterol 80 <130 mg/dL    Patient Fasting > 8hrs? Yes     Narrative    Cholesterol  Desirable:  <200 mg/dL    Triglycerides  Normal:  Less than 150 mg/dL  Borderline High:  150-199 mg/dL  High:  200-499 mg/dL  Very High:  Greater than or equal to 500 mg/dL    Direct Measure HDL  Female:  Greater than or equal to 50 mg/dL   Male:  Greater than or equal to 40 mg/dL    LDL Cholesterol  Desirable:  <100mg/dL  Above Desirable:  100-129 mg/dL   Borderline High:  130-159 mg/dL   High:  160-189 mg/dL   Very High:  >= 190 mg/dL    Non HDL Cholesterol  Desirable:  130 mg/dL  Above Desirable:  130-159 mg/dL  Borderline High:  160-189 mg/dL  High:  190-219 mg/dL  Very High:  Greater than or equal to 220 mg/dL   Comprehensive metabolic panel (BMP + Alb, Alk Phos, ALT, AST, Total. Bili, TP)   Result Value Ref Range    Sodium 143 133 - 144 mmol/L    Potassium 4.0 3.4 - 5.3 mmol/L    Chloride 105 94 - 109 mmol/L    Carbon Dioxide (CO2) 32 20 - 32 mmol/L    Anion Gap 6 3 - 14 mmol/L    Urea Nitrogen 19 7 - 30 mg/dL    Creatinine 0.73 0.52 - 1.04 mg/dL    Calcium 9.7 8.5 - 10.1 mg/dL    Glucose 100 (H) 70 - 99 mg/dL    Alkaline Phosphatase 69 40 - 150 U/L    AST 15 0 - 45 U/L    ALT 23 0 - 50 U/L    Protein Total 7.8 6.8 - 8.8 g/dL    Albumin 4.0 3.4 - 5.0 g/dL    Bilirubin Total 0.5 0.2 - 1.3 mg/dL    GFR Estimate 86 >60 mL/min/1.73m2      Comment:      eGFR calculated using 2021 CKD-EPI equation.       If you  have any questions or concerns, please call the clinic at the number listed above.       Sincerely,      Nathaniel Walker PA-C

## 2023-02-15 NOTE — PROGRESS NOTES
"SUBJECTIVE:   Dora is a 72 year old who presents for Preventive Visit.    Patient has been advised of split billing requirements and indicates understanding: Yes  Are you in the first 12 months of your Medicare coverage?  No    Healthy Habits:     In general, how would you rate your overall health?  Fair    Frequency of exercise:  None    Do you usually eat at least 4 servings of fruit and vegetables a day, include whole grains    & fiber and avoid regularly eating high fat or \"junk\" foods?  No    Taking medications regularly:  Yes    Ability to successfully perform activities of daily living:  No assistance needed    Home Safety:  No safety concerns identified    Hearing Impairment:  Difficulty following a conversation in a noisy restaurant or crowded room, difficult to understand a speaker at a public meeting or [employer] service, need to ask people to speak up or repeat themselves and difficulty understanding soft or whispered speech    In the past 6 months, have you been bothered by leaking of urine?  No    In general, how would you rate your overall mental or emotional health?  Fair      PHQ-2 Total Score: 0    Additional concerns today:  No    She has had also 2 months of bilateral shoulder pain right greater than left no new injuries or new activities.  She does sleep on her right shoulder and wakes up and is stiff and sore.  She has pain with overhead activities.  She denies any numbness or tingling or neck pain.  She has not been doing thing for treatment     have you ever done Advance Care Planning? (For example, a Health Directive, POLST, or a discussion with a medical provider or your loved ones about your wishes): No, advance care planning information given to patient to review.  Patient declined advance care planning discussion at this time.       Fall risk  Fallen 2 or more times in the past year?: Yes  Any fall with injury in the past year?: No    Cognitive Screening no impairment      Reviewed " and updated as needed this visit by clinical staff   Tobacco  Allergies  Meds  Problems  Med Hx  Surg Hx  Fam Hx          Reviewed and updated as needed this visit by Provider   Tobacco  Allergies  Meds  Problems  Med Hx  Surg Hx  Fam Hx         Social History     Tobacco Use     Smoking status: Never     Smokeless tobacco: Never   Substance Use Topics     Alcohol use: No         Alcohol Use 2/15/2023   Prescreen: >3 drinks/day or >7 drinks/week? Not Applicable         Hyperlipidemia Follow-Up      Are you regularly taking any medication or supplement to lower your cholesterol?   Yes- simvastatin    Are you having muscle aches or other side effects that you think could be caused by your cholesterol lowering medication?  No    Hypertension Follow-up      Do you check your blood pressure regularly outside of the clinic? Yes     Are you following a low salt diet? Yes    Are your blood pressures ever more than 140 on the top number (systolic) OR more   than 90 on the bottom number (diastolic), for example 140/90? No      Current providers sharing in care for this patient include:   Patient Care Team:  Nathaniel Walker PA-C as PCP - General (Physician Assistant)  Nathaniel Walker PA-C as Assigned PCP  Christiana Moya MD as Assigned Heart and Vascular Provider    The following health maintenance items are reviewed in Epic and correct as of today:  Health Maintenance   Topic Date Due     DEXA  Never done     ANNUAL REVIEW OF  ORDERS  Never done     MAMMO SCREENING  Never done     ZOSTER IMMUNIZATION (1 of 2) Never done     Pneumococcal Vaccine: 65+ Years (2 - PPSV23 if available, else PCV20) 06/14/2019     INFLUENZA VACCINE (1) 09/01/2022     MEDICARE ANNUAL WELLNESS VISIT  10/13/2022     LIPID  07/26/2023     FALL RISK ASSESSMENT  02/15/2024     COLORECTAL CANCER SCREENING  08/23/2025     ADVANCE CARE PLANNING  02/15/2028     DTAP/TDAP/TD IMMUNIZATION (2 - Td or Tdap) 06/14/2028      HEPATITIS C SCREENING  Completed     PHQ-2 (once per calendar year)  Completed     COVID-19 Vaccine  Completed     IPV IMMUNIZATION  Aged Out     MENINGITIS IMMUNIZATION  Aged Out     Lab work is in process  Labs reviewed in EPIC  BP Readings from Last 3 Encounters:   02/15/23 (!) 160/80   09/13/22 (!) 144/80   07/26/22 (!) 180/80    Wt Readings from Last 3 Encounters:   02/15/23 59.9 kg (132 lb)   09/13/22 59.4 kg (131 lb)   07/26/22 58.1 kg (128 lb)                  Patient Active Problem List   Diagnosis     Advanced directives, counseling/discussion     HTN, goal below 140/90     Benign essential hypertension     At risk for cardiovascular event     High cholesterol     Past Surgical History:   Procedure Laterality Date     APPENDECTOMY         Social History     Tobacco Use     Smoking status: Never     Smokeless tobacco: Never   Substance Use Topics     Alcohol use: No     Family History   Problem Relation Age of Onset     Hypertension Mother      Glaucoma No family hx of      Macular Degeneration No family hx of          Current Outpatient Medications   Medication Sig Dispense Refill     DiphenhydrAMINE HCl (SIMPLY ALLERGY PO) Take by mouth daily as needed       fluticasone (FLONASE) 50 MCG/ACT spray Spray 1-2 sprays into both nostrils daily 1 Bottle 11     hydrochlorothiazide (HYDRODIURIL) 12.5 MG tablet Take 1 tablet (12.5 mg) by mouth daily 90 tablet 1     lisinopril (ZESTRIL) 40 MG tablet Take 1 tablet (40 mg) by mouth daily 90 tablet 1     metoprolol succinate ER (TOPROL XL) 100 MG 24 hr tablet Take 1 tablet (100 mg) by mouth daily 90 tablet 1     simvastatin (ZOCOR) 20 MG tablet Take 1 tablet (20 mg) by mouth At Bedtime 90 tablet 1     Allergies   Allergen Reactions     Norvasc [Amlodipine] Other (See Comments)     Chest pain       Review of Systems   Constitutional: Negative for chills and fever.   HENT: Positive for hearing loss. Negative for congestion, ear pain and sore throat.    Eyes: Negative for  "pain and visual disturbance.   Respiratory: Negative for cough and shortness of breath.    Cardiovascular: Negative for chest pain, palpitations and peripheral edema.   Gastrointestinal: Positive for constipation and heartburn. Negative for abdominal pain, diarrhea, hematochezia and nausea.   Breasts:  Negative for tenderness, breast mass and discharge.   Genitourinary: Negative for dysuria, frequency, genital sores, hematuria, pelvic pain, urgency, vaginal bleeding and vaginal discharge.   Musculoskeletal: Positive for arthralgias. Negative for joint swelling and myalgias.   Skin: Negative for rash.   Neurological: Negative for dizziness, weakness, headaches and paresthesias.   Psychiatric/Behavioral: Negative for mood changes. The patient is not nervous/anxious.        OBJECTIVE:   BP (!) 160/80   Pulse 64   Temp (!) 96  F (35.6  C) (Tympanic)   Resp 14   Ht 1.575 m (5' 2\")   Wt 59.9 kg (132 lb)   SpO2 100%   BMI 24.14 kg/m   Estimated body mass index is 24.14 kg/m  as calculated from the following:    Height as of this encounter: 1.575 m (5' 2\").    Weight as of this encounter: 59.9 kg (132 lb).  Physical Exam  GENERAL: healthy, alert and no distress  EYES: Eyes grossly normal to inspection, PERRL and conjunctivae and sclerae normal  HENT: ear canals and TM's normal, nose and mouth without ulcers or lesions  NECK: no adenopathy, no asymmetry, masses, or scars and thyroid normal to palpation  RESP: lungs clear to auscultation - no rales, rhonchi or wheezes  CV: regular rate and rhythm, normal S1 S2, no S3 or S4, no murmur, click or rub, no peripheral edema and peripheral pulses strong  ABDOMEN: soft, nontender, no hepatosplenomegaly, no masses and bowel sounds normal  MS: no gross musculoskeletal defects noted, no edema  SKIN: no suspicious lesions or rashes  NEURO: Normal strength and tone, mentation intact and speech normal  PSYCH: mentation appears normal, affect normal/bright  Bilateral shoulder: " Decreased active range of motion in all directions due to pain.  Passive range of motion is to about 120 degrees forward flexion abduction to 90 external rotation to 45 at the side.  She has 5-5 strength in all directions.  She has a positive Neer's and Pena test.  She is neuro vas intact distally.    Diagnostic Test Results:  Labs reviewed in Epic    ASSESSMENT / PLAN:       ICD-10-CM    1. Encounter for Medicare annual wellness exam  Z00.00       2. HTN, goal below 140/90  I10 Comprehensive metabolic panel (BMP + Alb, Alk Phos, ALT, AST, Total. Bili, TP)     hydrochlorothiazide (HYDRODIURIL) 12.5 MG tablet     lisinopril (ZESTRIL) 40 MG tablet     metoprolol succinate ER (TOPROL XL) 100 MG 24 hr tablet     simvastatin (ZOCOR) 20 MG tablet     Comprehensive metabolic panel (BMP + Alb, Alk Phos, ALT, AST, Total. Bili, TP)      3. At risk for cardiovascular event  Z91.89 simvastatin (ZOCOR) 20 MG tablet      4. High cholesterol  E78.00 Lipid panel reflex to direct LDL Fasting     Lipid panel reflex to direct LDL Fasting      5. Post-menopausal  Z78.0 DEXA HIP/PELVIS/SPINE - Future      6. Visit for screening mammogram  Z12.31 MA SCREENING DIGITAL BILAT - Future  (s+30)        medical conditions are stable. meds refilled.  Work on Healthy diet and exercise. Getting heart rate elevated for 30 mins most days of week.  I talked her about her shoulders at this point I offered physical therapy as I believe there is some tendinopathy developing.  She deferred for now.  She states her  had rotator cuff surgery and she will have him shown her some exercises.  Recheck in a couple months if needed.    Her blood pressure is elevated today she states she is getting better readings at home.  We will have her make an appointment with ancillary and bring her machine in with her to measure the accuracy of it.    COUNSELING:  Reviewed preventive health counseling, as reflected in patient instructions       Regular exercise        Healthy diet/nutrition       Vision screening       Dental care        She reports that she has never smoked. She has never used smokeless tobacco.      Appropriate preventive services were discussed with this patient, including applicable screening as appropriate for cardiovascular disease, diabetes, osteopenia/osteoporosis, and glaucoma.  As appropriate for age/gender, discussed screening for colorectal cancer, prostate cancer, breast cancer, and cervical cancer. Checklist reviewing preventive services available has been given to the patient.    Reviewed patients plan of care and provided an AVS. The Basic Care Plan (routine screening as documented in Health Maintenance) for Dora meets the Care Plan requirement. This Care Plan has been established and reviewed with the Patient.          Nathaniel Walker PA-C  [facility]    Identified Health Risks:

## 2023-02-15 NOTE — PATIENT INSTRUCTIONS
Patient Education   Personalized Prevention Plan  You are due for the preventive services outlined below.  Your care team is available to assist you in scheduling these services.  If you have already completed any of these items, please share that information with your care team to update in your medical record.  Health Maintenance Due   Topic Date Due     Osteoporosis Screening  Never done     ANNUAL REVIEW OF HM ORDERS  Never done     Mammogram  Never done     Zoster (Shingles) Vaccine (1 of 2) Never done     Pneumococcal Vaccine (2 - PPSV23 if available, else PCV20) 06/14/2019     Flu Vaccine (1) 09/01/2022     Annual Wellness Visit  10/13/2022     PHQ-2 (once per calendar year)  01/01/2023       Preventing Falls at Home  A person can fall for many reasons. Older adults may fall because reaction time slows down as we age. Your muscles and joints may get stiff, weak, or less flexible because of illness, medicines, or a physical condition.   Other health problems that make falls more likely include:     Arthritis    Dizziness or lightheadedness when you stand up (orthostatic hypotension)    History of a stroke    Dizziness    Anemia    Certain medicines taken for mental illness or to control blood pressure.    Problems with balance or gait    Bladder or urinary problems    History of falling    Changes in vision (vision impairment)    Changes in thinking skills and memory (cognitive impairment)  Injuries from a fall can include serious injuries such as broken bones, dislocated joints, internal bleeding and cuts. Injuries like these can limit your independence.   Prevention tips  To help prevent falls and fall-related injuries, follow the tips below.    Floors  To make floors safer:     Put nonskid pads under area rugs.    Remove small rugs.    Replace worn floor coverings.    Tack carpets firmly to each step on carpeted stairs. Put nonskid strips on the edges of uncarpeted stairs.    Keep floors and stairs free  of clutter and cords.    Arrange furniture so there are clear pathways.    Clean up any spills right away.  Bathrooms    To make bathrooms safer:     Install grab bars in the tub or shower.    Apply nonskid strips or put a nonskid rubber mat in the tub or shower.    Sit on a bath chair to bathe.    Use bathmats with nonskid backing.  Lighting  To improve visibility in your home:      Keep a flashlight in each room. Or put a lamp next to the bed within easy reach.    Put nightlights in the bedrooms, hallways, kitchen, and bathrooms.    Make sure all stairways have good lighting.    Take your time when going up and down stairs.    Put handrails on both sides of stairs and in walkways for more support. To prevent injury to your wrist or arm, don t use handrails to pull yourself up.    Install grab bars to pull yourself up.    Move or rearrange items that you use often. This will make them easier to find or reach.    Look at your home to find any safety hazards. Especially look at doorways, walkways, and the driveway. Remove or repair any safety problems that you find.  Other changes to make    Look around to find any safety hazards. Look closely at doorways, walkways, and the driveway. Remove or repair any safety problems that you find.    Wear shoes that fit well.    Take your time when going up and down stairs.    Put handrails on both sides of stairs and in walkways for more support. To prevent injury to your wrist or arm, don t use handrails to pull yourself up.    Install grab bars wherever needed to pull yourself up.    Arrange items that you use often. This will make them easier to find or reach.    [employer] last reviewed this educational content on 3/1/2020    2988-3078 [employer]. All rights reserved. This information is not intended as a substitute for professional medical care. Always follow your healthcare professional's instructions.

## 2023-03-01 ENCOUNTER — ALLIED HEALTH/NURSE VISIT (OUTPATIENT)
Dept: FAMILY MEDICINE | Facility: CLINIC | Age: 73
End: 2023-03-01
Payer: COMMERCIAL

## 2023-03-01 VITALS — DIASTOLIC BLOOD PRESSURE: 72 MMHG | HEART RATE: 63 BPM | SYSTOLIC BLOOD PRESSURE: 138 MMHG

## 2023-03-01 DIAGNOSIS — Z01.30 BP CHECK: Primary | ICD-10-CM

## 2023-03-01 PROCEDURE — 99207 PR NO CHARGE NURSE ONLY: CPT

## 2023-03-01 NOTE — Clinical Note
Per pt brought her BP monitor cuff to check and see if it matches ours.  Pt BP monitor cuff reading BP: 138/72 Pulse: 63

## 2023-03-01 NOTE — PROGRESS NOTES
SUBJECTIVE:  Dora Luna is an 72 year old female who presents for a blood pressure check.    The reason for the visit is:  an elevated blood pressure was noted elsewhere and they were told to come for a recheck    The patient reports that she IS taking the medication as prescribed.     Current Outpatient Medications   Medication     DiphenhydrAMINE HCl (SIMPLY ALLERGY PO)     fluticasone (FLONASE) 50 MCG/ACT spray     hydrochlorothiazide (HYDRODIURIL) 12.5 MG tablet     lisinopril (ZESTRIL) 40 MG tablet     metoprolol succinate ER (TOPROL XL) 100 MG 24 hr tablet     simvastatin (ZOCOR) 20 MG tablet     No current facility-administered medications for this visit.       Allergies   Allergen Reactions     Norvasc [Amlodipine] Other (See Comments)     Chest pain         OBJECTIVE:  Please get a blood pressure AND a pulse.  A height is also needed if has not been done in the past year.    /72   Pulse 63         If patient has diagnosis of HYPERTENSION, is there a goal on the problem list? Yes  Patient Active Problem List   Diagnosis     Advanced directives, counseling/discussion     HTN, goal below 140/90     Benign essential hypertension     At risk for cardiovascular event     High cholesterol       Plan:    Systolic BP    Greater than or equal to 100  OR Less than  140    Diastolic BP    Greater than or equal to 70 OR less than 90    Pulse    Pulse greater than 60 or less than 100      If all the above three parameters are met, patient to go home. Chart CC to Primary Care Provider  If any one of the above three parameters is not met notify RN or provider.

## 2023-05-03 ENCOUNTER — TELEPHONE (OUTPATIENT)
Dept: FAMILY MEDICINE | Facility: CLINIC | Age: 73
End: 2023-05-03
Payer: COMMERCIAL

## 2023-05-03 NOTE — TELEPHONE ENCOUNTER
Patient Quality Outreach    Patient is due for the following:   Breast Cancer Screening - Mammogram    Next Steps:   Schedule a office visit for Mammogram    Type of outreach:    Sent letter.    Next Steps:  Reach out within 90 days via Letter.    Max number of attempts reached: No. Will try again in 90 days if patient still on fail list.    Questions for provider review:    None     Josefina Son MA

## 2023-05-03 NOTE — LETTER
May 3, 2023    To  Dora uLna  [address] [facility] [address]    Your team at [facility] cares about your health. We have reviewed your chart and based on our findings; we are making the following recommendations to better manage your health.     You are in particular need of attention regarding the following:     Schedule Annual MAMMOGRAPHY. [facility] scheduling number is [phone] or schedule in [facility] (self referral).    If you have already completed these items, please contact the clinic via phone or   [facility]spek[facility]art so your care team can review and update your records. Thank you for   choosing [facility] for your healthcare needs. For any questions,   concerns, or to schedule an appointment please contact our clinic.    Healthy Regards,      Your [facility] Care Team

## 2023-10-12 DIAGNOSIS — I10 HTN, GOAL BELOW 140/90: ICD-10-CM

## 2023-10-12 DIAGNOSIS — Z91.89 AT RISK FOR CARDIOVASCULAR EVENT: ICD-10-CM

## 2023-10-12 RX ORDER — METOPROLOL SUCCINATE 100 MG/1
100 TABLET, EXTENDED RELEASE ORAL DAILY
Qty: 90 TABLET | Refills: 0 | Status: SHIPPED | OUTPATIENT
Start: 2023-10-12 | End: 2024-01-09

## 2023-10-12 RX ORDER — LISINOPRIL 40 MG/1
40 TABLET ORAL DAILY
Qty: 90 TABLET | Refills: 0 | Status: SHIPPED | OUTPATIENT
Start: 2023-10-12 | End: 2024-01-09

## 2023-10-12 RX ORDER — HYDROCHLOROTHIAZIDE 12.5 MG/1
12.5 TABLET ORAL DAILY
Qty: 90 TABLET | Refills: 0 | Status: SHIPPED | OUTPATIENT
Start: 2023-10-12 | End: 2024-02-20 | Stop reason: ALTCHOICE

## 2023-10-12 RX ORDER — SIMVASTATIN 20 MG
20 TABLET ORAL AT BEDTIME
Qty: 90 TABLET | Refills: 0 | Status: SHIPPED | OUTPATIENT
Start: 2023-10-12 | End: 2024-02-20

## 2023-11-21 ENCOUNTER — TELEPHONE (OUTPATIENT)
Dept: FAMILY MEDICINE | Facility: CLINIC | Age: 73
End: 2023-11-21
Payer: COMMERCIAL

## 2023-11-21 NOTE — TELEPHONE ENCOUNTER
Patient Quality Outreach    Patient is due for the following:   Breast Cancer Screening - Mammogram    Next Steps:   Schedule a office visit for Mammogram    Type of outreach:    Sent [employer] message.    Next Steps:  Reach out within 90 days via [employer].    Max number of attempts reached: No. Will try again in 90 days if patient still on fail list.    Questions for provider review:    None           Josefina Son MA

## 2023-11-21 NOTE — LETTER
November 21, 2023    To  Dora Luna  [address] [facility] [address]    Your team at [facility] cares about your health. We have reviewed your chart and based on our findings; we are making the following recommendations to better manage your health.     You are in particular need of attention regarding the following:     Schedule Annual MAMMOGRAPHY. [facility] scheduling number is [phone] or schedule in [facility] (self referral).    If you have already completed these items, please contact the clinic via phone or   Drimm[facility]art so your care team can review and update your records. Thank you for   choosing [facility] for your healthcare needs. For any questions,   concerns, or to schedule an appointment please contact our clinic.    Healthy Regards,      Your [facility] Care Team

## 2024-01-09 DIAGNOSIS — I10 HTN, GOAL BELOW 140/90: ICD-10-CM

## 2024-01-09 RX ORDER — METOPROLOL SUCCINATE 100 MG/1
100 TABLET, EXTENDED RELEASE ORAL DAILY
Qty: 90 TABLET | Refills: 0 | Status: SHIPPED | OUTPATIENT
Start: 2024-01-09 | End: 2024-02-20

## 2024-01-09 RX ORDER — LISINOPRIL 40 MG/1
40 TABLET ORAL DAILY
Qty: 90 TABLET | Refills: 0 | Status: SHIPPED | OUTPATIENT
Start: 2024-01-09 | End: 2024-02-20

## 2024-02-20 ENCOUNTER — OFFICE VISIT (OUTPATIENT)
Dept: FAMILY MEDICINE | Facility: CLINIC | Age: 74
End: 2024-02-20
Payer: COMMERCIAL

## 2024-02-20 ENCOUNTER — ANCILLARY PROCEDURE (OUTPATIENT)
Dept: GENERAL RADIOLOGY | Facility: CLINIC | Age: 74
End: 2024-02-20
Attending: PHYSICIAN ASSISTANT
Payer: COMMERCIAL

## 2024-02-20 VITALS
TEMPERATURE: 96.8 F | RESPIRATION RATE: 16 BRPM | WEIGHT: 132 LBS | DIASTOLIC BLOOD PRESSURE: 80 MMHG | HEIGHT: 62 IN | OXYGEN SATURATION: 98 % | HEART RATE: 68 BPM | SYSTOLIC BLOOD PRESSURE: 180 MMHG | BODY MASS INDEX: 24.29 KG/M2

## 2024-02-20 DIAGNOSIS — Z00.00 ENCOUNTER FOR MEDICARE ANNUAL WELLNESS EXAM: Primary | ICD-10-CM

## 2024-02-20 DIAGNOSIS — M25.551 HIP PAIN, RIGHT: ICD-10-CM

## 2024-02-20 DIAGNOSIS — I10 HTN, GOAL BELOW 140/90: ICD-10-CM

## 2024-02-20 DIAGNOSIS — Z91.89 AT RISK FOR CARDIOVASCULAR EVENT: ICD-10-CM

## 2024-02-20 DIAGNOSIS — Z12.31 ENCOUNTER FOR SCREENING MAMMOGRAM FOR MALIGNANT NEOPLASM OF BREAST: ICD-10-CM

## 2024-02-20 LAB
ALBUMIN SERPL BCG-MCNC: 4.4 G/DL (ref 3.5–5.2)
ALP SERPL-CCNC: 84 U/L (ref 40–150)
ALT SERPL W P-5'-P-CCNC: 14 U/L (ref 0–50)
ANION GAP SERPL CALCULATED.3IONS-SCNC: 12 MMOL/L (ref 7–15)
AST SERPL W P-5'-P-CCNC: 25 U/L (ref 0–45)
BILIRUB SERPL-MCNC: 0.5 MG/DL
BUN SERPL-MCNC: 17 MG/DL (ref 8–23)
CALCIUM SERPL-MCNC: 9.9 MG/DL (ref 8.8–10.2)
CHLORIDE SERPL-SCNC: 101 MMOL/L (ref 98–107)
CHOLEST SERPL-MCNC: 187 MG/DL
CREAT SERPL-MCNC: 0.75 MG/DL (ref 0.51–0.95)
DEPRECATED HCO3 PLAS-SCNC: 27 MMOL/L (ref 22–29)
EGFRCR SERPLBLD CKD-EPI 2021: 83 ML/MIN/1.73M2
FASTING STATUS PATIENT QL REPORTED: YES
GLUCOSE SERPL-MCNC: 99 MG/DL (ref 70–99)
HDLC SERPL-MCNC: 87 MG/DL
LDLC SERPL CALC-MCNC: 84 MG/DL
NONHDLC SERPL-MCNC: 100 MG/DL
POTASSIUM SERPL-SCNC: 4.2 MMOL/L (ref 3.4–5.3)
PROT SERPL-MCNC: 7.5 G/DL (ref 6.4–8.3)
SODIUM SERPL-SCNC: 140 MMOL/L (ref 135–145)
TRIGL SERPL-MCNC: 79 MG/DL

## 2024-02-20 PROCEDURE — 91320 SARSCV2 VAC 30MCG TRS-SUC IM: CPT | Performed by: PHYSICIAN ASSISTANT

## 2024-02-20 PROCEDURE — 73501 X-RAY EXAM HIP UNI 1 VIEW: CPT | Mod: TC | Performed by: RADIOLOGY

## 2024-02-20 PROCEDURE — 80053 COMPREHEN METABOLIC PANEL: CPT | Performed by: PHYSICIAN ASSISTANT

## 2024-02-20 PROCEDURE — 36415 COLL VENOUS BLD VENIPUNCTURE: CPT | Performed by: PHYSICIAN ASSISTANT

## 2024-02-20 PROCEDURE — 99214 OFFICE O/P EST MOD 30 MIN: CPT | Mod: 25 | Performed by: PHYSICIAN ASSISTANT

## 2024-02-20 PROCEDURE — 90480 ADMN SARSCOV2 VAC 1/ONLY CMP: CPT | Performed by: PHYSICIAN ASSISTANT

## 2024-02-20 PROCEDURE — 80061 LIPID PANEL: CPT | Performed by: PHYSICIAN ASSISTANT

## 2024-02-20 PROCEDURE — G0439 PPPS, SUBSEQ VISIT: HCPCS | Performed by: PHYSICIAN ASSISTANT

## 2024-02-20 RX ORDER — LISINOPRIL 40 MG/1
40 TABLET ORAL DAILY
Qty: 90 TABLET | Refills: 1 | Status: SHIPPED | OUTPATIENT
Start: 2024-02-20

## 2024-02-20 RX ORDER — CARVEDILOL 6.25 MG/1
6.25 TABLET ORAL 2 TIMES DAILY WITH MEALS
Qty: 60 TABLET | Refills: 1 | Status: SHIPPED | OUTPATIENT
Start: 2024-02-20 | End: 2024-04-15

## 2024-02-20 RX ORDER — HYDROCHLOROTHIAZIDE 25 MG/1
25 TABLET ORAL DAILY
Status: CANCELLED | OUTPATIENT
Start: 2024-02-20

## 2024-02-20 RX ORDER — METOPROLOL SUCCINATE 100 MG/1
100 TABLET, EXTENDED RELEASE ORAL DAILY
Qty: 90 TABLET | Refills: 1 | Status: SHIPPED | OUTPATIENT
Start: 2024-02-20 | End: 2024-02-20 | Stop reason: ALTCHOICE

## 2024-02-20 RX ORDER — HYDROCHLOROTHIAZIDE 12.5 MG/1
12.5 TABLET ORAL DAILY
Qty: 90 TABLET | Refills: 0 | Status: CANCELLED | OUTPATIENT
Start: 2024-02-20

## 2024-02-20 RX ORDER — SIMVASTATIN 20 MG
20 TABLET ORAL AT BEDTIME
Qty: 90 TABLET | Refills: 1 | Status: SHIPPED | OUTPATIENT
Start: 2024-02-20 | End: 2024-06-14

## 2024-02-20 RX ORDER — CHLORTHALIDONE 25 MG/1
25 TABLET ORAL DAILY
Qty: 90 TABLET | Refills: 1 | Status: SHIPPED | OUTPATIENT
Start: 2024-02-20 | End: 2024-06-14

## 2024-02-20 SDOH — HEALTH STABILITY: PHYSICAL HEALTH: ON AVERAGE, HOW MANY DAYS PER WEEK DO YOU ENGAGE IN MODERATE TO STRENUOUS EXERCISE (LIKE A BRISK WALK)?: 6 DAYS

## 2024-02-20 ASSESSMENT — SOCIAL DETERMINANTS OF HEALTH (SDOH): HOW OFTEN DO YOU GET TOGETHER WITH FRIENDS OR RELATIVES?: TWICE A WEEK

## 2024-02-20 ASSESSMENT — PAIN SCALES - GENERAL: PAINLEVEL: EXTREME PAIN (8)

## 2024-02-20 NOTE — LETTER
February 21, 2024      Dora Luna  [address] [facility] [address]        Dear ,    We are writing to inform you of your test results.    Your test results fall within the expected range(s) or remain unchanged from previous results.  Please continue with current treatment plan.    Resulted Orders   Lipid panel reflex to direct LDL Non-fasting   Result Value Ref Range    Cholesterol 187 <200 mg/dL    Triglycerides 79 <150 mg/dL    Direct Measure HDL 87 >=50 mg/dL    LDL Cholesterol Calculated 84 <=100 mg/dL    Non HDL Cholesterol 100 <130 mg/dL    Patient Fasting > 8hrs? Yes     Narrative    Cholesterol  Desirable:  <200 mg/dL    Triglycerides  Normal:  Less than 150 mg/dL  Borderline High:  150-199 mg/dL  High:  200-499 mg/dL  Very High:  Greater than or equal to 500 mg/dL    Direct Measure HDL  Female:  Greater than or equal to 50 mg/dL   Male:  Greater than or equal to 40 mg/dL    LDL Cholesterol  Desirable:  <100mg/dL  Above Desirable:  100-129 mg/dL   Borderline High:  130-159 mg/dL   High:  160-189 mg/dL   Very High:  >= 190 mg/dL    Non HDL Cholesterol  Desirable:  130 mg/dL  Above Desirable:  130-159 mg/dL  Borderline High:  160-189 mg/dL  High:  190-219 mg/dL  Very High:  Greater than or equal to 220 mg/dL   Comprehensive metabolic panel (BMP + Alb, Alk Phos, ALT, AST, Total. Bili, TP)   Result Value Ref Range    Sodium 140 135 - 145 mmol/L      Comment:      Reference intervals for this test were updated on 09/26/2023 to more accurately reflect our healthy population. There may be differences in the flagging of prior results with similar values performed with this method. Interpretation of those prior results can be made in the context of the updated reference intervals.     Potassium 4.2 3.4 - 5.3 mmol/L    Carbon Dioxide (CO2) 27 22 - 29 mmol/L    Anion Gap 12 7 - 15 mmol/L    Urea Nitrogen 17.0 8.0 - 23.0 mg/dL    Creatinine 0.75 0.51 - 0.95 mg/dL    GFR Estimate 83 >60  mL/min/1.73m2    Calcium 9.9 8.8 - 10.2 mg/dL    Chloride 101 98 - 107 mmol/L    Glucose 99 70 - 99 mg/dL    Alkaline Phosphatase 84 40 - 150 U/L      Comment:      Reference intervals for this test were updated on 11/14/2023 to more accurately reflect our healthy population. There may be differences in the flagging of prior results with similar values performed with this method. Interpretation of those prior results can be made in the context of the updated reference intervals.    AST 25 0 - 45 U/L      Comment:      Reference intervals for this test were updated on 6/12/2023 to more accurately reflect our healthy population. There may be differences in the flagging of prior results with similar values performed with this method. Interpretation of those prior results can be made in the context of the updated reference intervals.    ALT 14 0 - 50 U/L      Comment:      Reference intervals for this test were updated on 6/12/2023 to more accurately reflect our healthy population. There may be differences in the flagging of prior results with similar values performed with this method. Interpretation of those prior results can be made in the context of the updated reference intervals.      Protein Total 7.5 6.4 - 8.3 g/dL    Albumin 4.4 3.5 - 5.2 g/dL    Bilirubin Total 0.5 <=1.2 mg/dL       If you have any questions or concerns, please call the clinic at the number listed above.       Sincerely,      Nathaniel Walker PA-C

## 2024-02-20 NOTE — PATIENT INSTRUCTIONS
Preventive Care Advice   This is general advice given by our system to help you stay healthy. However, your care team may have specific advice just for you. Please talk to your care team about your preventive care needs.  Nutrition  Eat 5 or more servings of fruits and vegetables each day.  Try wheat bread, brown rice and whole grain pasta (instead of white bread, rice, and pasta).  Get enough calcium and vitamin D. Check the label on foods and aim for 100% of the RDA (recommended daily allowance).  Lifestyle  Exercise at least 150 minutes each week  (30 minutes a day, 5 days a week).  Do muscle strengthening activities 2 days a week. These help control your weight and prevent disease.  No smoking.  Wear sunscreen to prevent skin cancer.  Have a dental exam and cleaning every 6 months.  Yearly exams  See your health care team every year to talk about:  Any changes in your health.  Any medicines your care team has prescribed.  Preventive care, family planning, and ways to prevent chronic diseases.  Shots (vaccines)   HPV shots (up to age 26), if you've never had them before.  Hepatitis B shots (up to age 59), if you've never had them before.  COVID-19 shot: Get this shot when it's due.  Flu shot: Get a flu shot every year.  Tetanus shot: Get a tetanus shot every 10 years.  Pneumococcal, hepatitis A, and RSV shots: Ask your care team if you need these based on your risk.  Shingles shot (for age 50 and up)  General health tests  Diabetes screening:  Starting at age 35, Get screened for diabetes at least every 3 years.  If you are younger than age 35, ask your care team if you should be screened for diabetes.  Cholesterol test: At age 39, start having a cholesterol test every 5 years, or more often if advised.  Bone density scan (DEXA): At age 50, ask your care team if you should have this scan for osteoporosis (brittle bones).  Hepatitis C: Get tested at least once in your life.  STIs (sexually transmitted  infections)  Before age 24: Ask your care team if you should be screened for STIs.  After age 24: Get screened for STIs if you're at risk. You are at risk for STIs (including HIV) if:  You are sexually active with more than one person.  You don't use condoms every time.  You or a partner was diagnosed with a sexually transmitted infection.  If you are at risk for HIV, ask about PrEP medicine to prevent HIV.  Get tested for HIV at least once in your life, whether you are at risk for HIV or not.  Cancer screening tests  Cervical cancer screening: If you have a cervix, begin getting regular cervical cancer screening tests starting at age 21.  Breast cancer scan (mammogram): If you've ever had breasts, begin having regular mammograms starting at age 40. This is a scan to check for breast cancer.  Colon cancer screening: It is important to start screening for colon cancer at age 45.  Have a colonoscopy test every 10 years (or more often if you're at risk) Or, ask your provider about stool tests like a FIT test every year or Cologuard test every 3 years.  To learn more about your testing options, visit:   https://www.TapTrack/287611.pdf.  For help making a decision, visit:   https://bit.ly/oa38495.  Prostate cancer screening test: If you have a prostate, ask your care team if a prostate cancer screening test (PSA) at age 55 is right for you.  Lung cancer screening: If you are a current or former smoker ages 50 to 80, ask your care team if ongoing lung cancer screenings are right for you.  For informational purposes only. Not to replace the advice of your health care provider. Copyright   2023 [employer]. All rights reserved. Clinically reviewed by the [facility] Transitions Program. Tellme [id number] - REV 01/24.

## 2024-02-20 NOTE — PROGRESS NOTES
Assessment & Plan       ICD-10-CM    1. Encounter for Medicare annual wellness exam  Z00.00 MA SCREENING DIGITAL BILAT - Future  (s+30)      2. HTN, goal below 140/90  I10 Lipid panel reflex to direct LDL Non-fasting     lisinopril (ZESTRIL) 40 MG tablet     simvastatin (ZOCOR) 20 MG tablet     chlorthalidone (HYGROTON) 25 MG tablet     Comprehensive metabolic panel (BMP + Alb, Alk Phos, ALT, AST, Total. Bili, TP)     Lipid panel reflex to direct LDL Non-fasting     Comprehensive metabolic panel (BMP + Alb, Alk Phos, ALT, AST, Total. Bili, TP)     carvedilol (COREG) 6.25 MG tablet     DISCONTINUED: metoprolol succinate ER (TOPROL XL) 100 MG 24 hr tablet      3. At risk for cardiovascular event  Z91.89 simvastatin (ZOCOR) 20 MG tablet      4. Hip pain, right  M25.551 XR Pelvis and Hip Right 1 View     Orthopedic  Referral      5. Encounter for screening mammogram for malignant neoplasm of breast  Z12.31 MA SCREENING DIGITAL BILAT - Future  (s+30)        Work on Healthy diet and exercise. Getting heart rate elevated for 30 mins most days of week.  Labs pending.   Working to switch her hydrochlorothiazide to chlorthalidone and her metoprolol to carvedilol.  Warning signs side effects were discussed.  Will recheck blood pressure in 4 weeks.  I encouraged her to follow-up with orthopedics for her right hip.        Counseling  Appropriate preventive services were discussed with this patient, including applicable screening as appropriate for fall prevention, nutrition, physical activity, Tobacco-use cessation, weight loss and cognition.  Checklist reviewing preventive services available has been given to the patient.  Reviewed patient's diet, addressing concerns and/or questions.       Luis Jonh is a 73 year old, presenting for the following health issues:  Physical        2/20/2024     7:11 AM   Additional Questions   Roomed by anenlise   Accompanied by spouse         2/20/2024     7:11 AM   Patient  "Reported Additional Medications   Patient reports taking the following new medications no     HPI       Hyperlipidemia Follow-Up    Are you regularly taking any medication or supplement to lower your cholesterol?   Yes- simvastatin  Are you having muscle aches or other side effects that you think could be caused by your cholesterol lowering medication?  No    Hypertension Follow-up    Do you check your blood pressure regularly outside of the clinic? Yes   Are you following a low salt diet? Yes  Are your blood pressures ever more than 140 on the top number (systolic) OR more   than 90 on the bottom number (diastolic), for example 140/90? Yes  Right hip pain off and on for 1 yr.       Review of Systems  Constitutional, HEENT, cardiovascular, pulmonary, gi and gu systems are negative, except as otherwise noted.      Objective    BP (!) 180/80   Pulse 68   Temp 96.8  F (36  C) (Tympanic)   Resp 16   Ht 1.575 m (5' 2\")   Wt 59.9 kg (132 lb)   SpO2 98%   BMI 24.14 kg/m    Body mass index is 24.14 kg/m .  Physical Exam   GENERAL: alert and no distress  EYES: Eyes grossly normal to inspection, PERRL and conjunctivae and sclerae normal  HENT: ear canals and TM's normal, nose and mouth without ulcers or lesions  NECK: no adenopathy, no asymmetry, masses, or scars  RESP: lungs clear to auscultation - no rales, rhonchi or wheezes  CV: regular rate and rhythm, normal S1 S2, no S3 or S4, no murmur, click or rub, no peripheral edema  ABDOMEN: soft, nontender, no hepatosplenomegaly, no masses and bowel sounds normal  MS: no gross musculoskeletal defects noted, no edema  SKIN: no suspicious lesions or rashes  NEURO: Normal strength and tone, mentation intact and speech normal  PSYCH: mentation appears normal, affect normal/bright  Right hip has generalized decreased range of motion more in internal/external rotation.  Calves soft nontender neuro vas intact distally.  Knee has full range of motion with no tenderness.  " Ligaments stable valgus varus and Lachman's.  Negative Dez's.  No back tenderness on exam today.    Labs pending  X-ray right hip: mod to severe arthritis. pending radiology          Signed Electronically by: Nathaniel Walker PA-C

## 2024-03-20 ENCOUNTER — OFFICE VISIT (OUTPATIENT)
Dept: FAMILY MEDICINE | Facility: CLINIC | Age: 74
End: 2024-03-20
Payer: COMMERCIAL

## 2024-03-20 VITALS
OXYGEN SATURATION: 99 % | SYSTOLIC BLOOD PRESSURE: 128 MMHG | HEART RATE: 70 BPM | DIASTOLIC BLOOD PRESSURE: 60 MMHG | HEIGHT: 62 IN | WEIGHT: 128 LBS | TEMPERATURE: 96.8 F | BODY MASS INDEX: 23.55 KG/M2 | RESPIRATION RATE: 16 BRPM

## 2024-03-20 DIAGNOSIS — I10 HTN, GOAL BELOW 140/90: Primary | ICD-10-CM

## 2024-03-20 LAB
ANION GAP SERPL CALCULATED.3IONS-SCNC: 11 MMOL/L (ref 7–15)
BUN SERPL-MCNC: 21.4 MG/DL (ref 8–23)
CALCIUM SERPL-MCNC: 9.9 MG/DL (ref 8.8–10.2)
CHLORIDE SERPL-SCNC: 99 MMOL/L (ref 98–107)
CREAT SERPL-MCNC: 0.84 MG/DL (ref 0.51–0.95)
DEPRECATED HCO3 PLAS-SCNC: 28 MMOL/L (ref 22–29)
EGFRCR SERPLBLD CKD-EPI 2021: 72 ML/MIN/1.73M2
GLUCOSE SERPL-MCNC: 101 MG/DL (ref 70–99)
POTASSIUM SERPL-SCNC: 3.8 MMOL/L (ref 3.4–5.3)
SODIUM SERPL-SCNC: 138 MMOL/L (ref 135–145)

## 2024-03-20 PROCEDURE — 80048 BASIC METABOLIC PNL TOTAL CA: CPT | Performed by: PHYSICIAN ASSISTANT

## 2024-03-20 PROCEDURE — 99213 OFFICE O/P EST LOW 20 MIN: CPT | Performed by: PHYSICIAN ASSISTANT

## 2024-03-20 PROCEDURE — 99000 SPECIMEN HANDLING OFFICE-LAB: CPT | Performed by: PHYSICIAN ASSISTANT

## 2024-03-20 PROCEDURE — 36415 COLL VENOUS BLD VENIPUNCTURE: CPT | Performed by: PHYSICIAN ASSISTANT

## 2024-03-20 PROCEDURE — 84244 ASSAY OF RENIN: CPT | Mod: 90 | Performed by: PHYSICIAN ASSISTANT

## 2024-03-20 PROCEDURE — 82088 ASSAY OF ALDOSTERONE: CPT | Performed by: PHYSICIAN ASSISTANT

## 2024-03-20 ASSESSMENT — PAIN SCALES - GENERAL: PAINLEVEL: EXTREME PAIN (8)

## 2024-03-20 NOTE — PROGRESS NOTES
"  Assessment & Plan       ICD-10-CM    1. HTN, goal below 140/90  I10 Basic metabolic panel  (Ca, Cl, CO2, Creat, Gluc, K, Na, BUN)     Aldosterone     Renin activity     Aldosterone Renin Ratio     Basic metabolic panel  (Ca, Cl, CO2, Creat, Gluc, K, Na, BUN)     Aldosterone Renin Ratio     CANCELED: Aldosterone     CANCELED: Renin activity      medical conditions are stable. meds refilled.  Recheck 6 month. warning signs discussed.       Subjective   Dora is a 74 year old, presenting for the following health issues:  Hypertension        3/20/2024     8:05 AM   Additional Questions   Roomed by annelise   Accompanied by spouse         3/20/2024     8:05 AM   Patient Reported Additional Medications   Patient reports taking the following new medications no     History of Present Illness       Hypertension: She presents for follow up of hypertension.  She does check blood pressure  regularly outside of the clinic. Outside blood pressures have been over 140/90. She follows a low salt diet.     She eats 0-1 servings of fruits and vegetables daily.She consumes 1 sweetened beverage(s) daily.She exercises with enough effort to increase her heart rate 9 or less minutes per day.  She exercises with enough effort to increase her heart rate 3 or less days per week.   She is taking medications regularly.       Hypertension Follow-up    Do you check your blood pressure regularly outside of the clinic? Yes   Are you following a low salt diet? Yes  Are your blood pressures ever more than 140 on the top number (systolic) OR more   than 90 on the bottom number (diastolic), for example 140/90? Yes      Review of Systems  Constitutional, HEENT, cardiovascular, pulmonary, gi and gu systems are negative, except as otherwise noted.      Objective    /60   Pulse 70   Temp 96.8  F (36  C) (Tympanic)   Resp 16   Ht 1.575 m (5' 2\")   Wt 58.1 kg (128 lb)   SpO2 99%   BMI 23.41 kg/m    Body mass index is 23.41 kg/m .  Physical Exam "   GENERAL: alert and no distress  RESP: lungs clear to auscultation - no rales, rhonchi or wheezes  CV: regular rate and rhythm, normal S1 S2, no S3 or S4, no murmur, click or rub, no peripheral edema  MS: no gross musculoskeletal defects noted, no edema    Labs pending        Signed Electronically by: Nathaniel Walker PA-C

## 2024-03-22 LAB — ALDOST SERPL-MCNC: 13 NG/DL (ref 0–31)

## 2024-03-23 LAB — RENIN PLAS-CCNC: 0.6 NG/ML/HR

## 2024-03-25 LAB — ALDOST/RENIN PLAS-RTO: 21.7 {RATIO} (ref 0–25)

## 2024-04-14 DIAGNOSIS — I10 HTN, GOAL BELOW 140/90: ICD-10-CM

## 2024-04-15 RX ORDER — CARVEDILOL 6.25 MG/1
6.25 TABLET ORAL 2 TIMES DAILY WITH MEALS
Qty: 180 TABLET | Refills: 2 | Status: SHIPPED | OUTPATIENT
Start: 2024-04-15

## 2024-06-14 DIAGNOSIS — Z91.89 AT RISK FOR CARDIOVASCULAR EVENT: ICD-10-CM

## 2024-06-14 DIAGNOSIS — I10 HTN, GOAL BELOW 140/90: ICD-10-CM

## 2024-06-14 RX ORDER — CHLORTHALIDONE 25 MG/1
25 TABLET ORAL DAILY
Qty: 90 TABLET | Refills: 0 | Status: SHIPPED | OUTPATIENT
Start: 2024-06-14 | End: 2024-09-11

## 2024-06-14 RX ORDER — SIMVASTATIN 20 MG
20 TABLET ORAL AT BEDTIME
Qty: 90 TABLET | Refills: 0 | Status: SHIPPED | OUTPATIENT
Start: 2024-06-14 | End: 2024-09-11

## 2024-09-11 DIAGNOSIS — I10 HTN, GOAL BELOW 140/90: ICD-10-CM

## 2024-09-11 DIAGNOSIS — Z91.89 AT RISK FOR CARDIOVASCULAR EVENT: ICD-10-CM

## 2024-09-11 RX ORDER — SIMVASTATIN 20 MG
20 TABLET ORAL AT BEDTIME
Qty: 90 TABLET | Refills: 0 | Status: SHIPPED | OUTPATIENT
Start: 2024-09-11

## 2024-09-11 RX ORDER — CHLORTHALIDONE 25 MG/1
25 TABLET ORAL DAILY
Qty: 90 TABLET | Refills: 0 | Status: SHIPPED | OUTPATIENT
Start: 2024-09-11

## 2024-11-05 DIAGNOSIS — I10 HTN, GOAL BELOW 140/90: ICD-10-CM

## 2024-11-05 RX ORDER — LISINOPRIL 40 MG/1
40 TABLET ORAL DAILY
Qty: 90 TABLET | Refills: 0 | Status: SHIPPED | OUTPATIENT
Start: 2024-11-05

## 2025-01-21 ENCOUNTER — PATIENT OUTREACH (OUTPATIENT)
Dept: CARE COORDINATION | Facility: CLINIC | Age: 75
End: 2025-01-21
Payer: COMMERCIAL

## 2025-02-04 ENCOUNTER — PATIENT OUTREACH (OUTPATIENT)
Dept: CARE COORDINATION | Facility: CLINIC | Age: 75
End: 2025-02-04
Payer: COMMERCIAL

## 2025-02-13 NOTE — TELEPHONE ENCOUNTER
Panel Management Review      Patient has the following on her problem list: None      Composite cancer screening  Chart review shows that this patient is due/due soon for the following Mammogram  Summary:    Patient is due/failing the following:   MAMMOGRAM    Action needed:   Patient needs referral/order: mammogram    Type of outreach:    per 11/16/17 note do not call     Questions for provider review:    None                                                                                                                                    SOLA Sanches   9:14 AM  2/19/2018         Chart routed to self  .           Presented on 1/19/25 with respiratory failure related to Influenza A  Intubated 1/21/25 - 1/30/25.  Required HFNC  Weaning down from oxygen.  Now on RA in AM   Improving    PLAN   Continue IS, OPEP, and pulmonary toileting  Aspiration precautions, deep breathing  Monitor pulmonary status while in ARC  Has been stable on RA   Follow-up with Pulmonary as outpatient

## 2025-02-24 ENCOUNTER — PATIENT OUTREACH (OUTPATIENT)
Dept: CARE COORDINATION | Facility: CLINIC | Age: 75
End: 2025-02-24
Payer: COMMERCIAL

## 2025-02-26 ENCOUNTER — OFFICE VISIT (OUTPATIENT)
Dept: FAMILY MEDICINE | Facility: CLINIC | Age: 75
End: 2025-02-26
Payer: COMMERCIAL

## 2025-02-26 VITALS
WEIGHT: 125 LBS | HEART RATE: 61 BPM | HEIGHT: 62 IN | BODY MASS INDEX: 23 KG/M2 | RESPIRATION RATE: 16 BRPM | SYSTOLIC BLOOD PRESSURE: 146 MMHG | TEMPERATURE: 97 F | OXYGEN SATURATION: 96 % | DIASTOLIC BLOOD PRESSURE: 75 MMHG

## 2025-02-26 DIAGNOSIS — Z78.0 POST-MENOPAUSAL: ICD-10-CM

## 2025-02-26 DIAGNOSIS — I10 HTN, GOAL BELOW 140/90: ICD-10-CM

## 2025-02-26 DIAGNOSIS — M25.551 HIP PAIN, RIGHT: ICD-10-CM

## 2025-02-26 DIAGNOSIS — Z00.00 ENCOUNTER FOR MEDICARE ANNUAL WELLNESS EXAM: Primary | ICD-10-CM

## 2025-02-26 DIAGNOSIS — Z91.89 AT RISK FOR CARDIOVASCULAR EVENT: ICD-10-CM

## 2025-02-26 DIAGNOSIS — Z13.1 SCREENING FOR DIABETES MELLITUS: ICD-10-CM

## 2025-02-26 LAB
ALBUMIN SERPL BCG-MCNC: 4.2 G/DL (ref 3.5–5.2)
ALP SERPL-CCNC: 64 U/L (ref 40–150)
ALT SERPL W P-5'-P-CCNC: 13 U/L (ref 0–50)
ANION GAP SERPL CALCULATED.3IONS-SCNC: 10 MMOL/L (ref 7–15)
AST SERPL W P-5'-P-CCNC: 20 U/L (ref 0–45)
BILIRUB SERPL-MCNC: 0.4 MG/DL
BUN SERPL-MCNC: 29.2 MG/DL (ref 8–23)
CALCIUM SERPL-MCNC: 10 MG/DL (ref 8.8–10.4)
CHLORIDE SERPL-SCNC: 102 MMOL/L (ref 98–107)
CHOLEST SERPL-MCNC: 185 MG/DL
CREAT SERPL-MCNC: 1.08 MG/DL (ref 0.51–0.95)
EGFRCR SERPLBLD CKD-EPI 2021: 53 ML/MIN/1.73M2
FASTING STATUS PATIENT QL REPORTED: NO
FASTING STATUS PATIENT QL REPORTED: NO
GLUCOSE SERPL-MCNC: 105 MG/DL (ref 70–99)
HCO3 SERPL-SCNC: 27 MMOL/L (ref 22–29)
HDLC SERPL-MCNC: 89 MG/DL
LDLC SERPL CALC-MCNC: 82 MG/DL
NONHDLC SERPL-MCNC: 96 MG/DL
POTASSIUM SERPL-SCNC: 4 MMOL/L (ref 3.4–5.3)
PROT SERPL-MCNC: 7.4 G/DL (ref 6.4–8.3)
SODIUM SERPL-SCNC: 139 MMOL/L (ref 135–145)
TRIGL SERPL-MCNC: 71 MG/DL

## 2025-02-26 RX ORDER — LISINOPRIL 40 MG/1
40 TABLET ORAL DAILY
Qty: 90 TABLET | Refills: 0 | Status: SHIPPED | OUTPATIENT
Start: 2025-02-26

## 2025-02-26 RX ORDER — CHLORTHALIDONE 25 MG/1
25 TABLET ORAL DAILY
Qty: 90 TABLET | Refills: 0 | Status: SHIPPED | OUTPATIENT
Start: 2025-02-26

## 2025-02-26 RX ORDER — CARVEDILOL 6.25 MG/1
6.25 TABLET ORAL 2 TIMES DAILY WITH MEALS
Qty: 180 TABLET | Refills: 2 | Status: SHIPPED | OUTPATIENT
Start: 2025-02-26

## 2025-02-26 RX ORDER — SIMVASTATIN 20 MG
20 TABLET ORAL AT BEDTIME
Qty: 90 TABLET | Refills: 0 | Status: SHIPPED | OUTPATIENT
Start: 2025-02-26

## 2025-02-26 SDOH — HEALTH STABILITY: PHYSICAL HEALTH: ON AVERAGE, HOW MANY MINUTES DO YOU ENGAGE IN EXERCISE AT THIS LEVEL?: 20 MIN

## 2025-02-26 SDOH — HEALTH STABILITY: PHYSICAL HEALTH: ON AVERAGE, HOW MANY DAYS PER WEEK DO YOU ENGAGE IN MODERATE TO STRENUOUS EXERCISE (LIKE A BRISK WALK)?: 3 DAYS

## 2025-02-26 ASSESSMENT — PAIN SCALES - GENERAL: PAINLEVEL_OUTOF10: SEVERE PAIN (8)

## 2025-02-26 ASSESSMENT — SOCIAL DETERMINANTS OF HEALTH (SDOH): HOW OFTEN DO YOU GET TOGETHER WITH FRIENDS OR RELATIVES?: ONCE A WEEK

## 2025-02-26 NOTE — PATIENT INSTRUCTIONS
Patient Education   Preventive Care Advice   This is general advice given by our system to help you stay healthy. However, your care team may have specific advice just for you. Please talk to your care team about your preventive care needs.  Nutrition  Eat 5 or more servings of fruits and vegetables each day.  Try wheat bread, brown rice and whole grain pasta (instead of white bread, rice, and pasta).  Get enough calcium and vitamin D. Check the label on foods and aim for 100% of the RDA (recommended daily allowance).  Lifestyle  Exercise at least 150 minutes each week  (30 minutes a day, 5 days a week).  Do muscle strengthening activities 2 days a week. These help control your weight and prevent disease.  No smoking.  Wear sunscreen to prevent skin cancer.  Have a dental exam and cleaning every 6 months.  Yearly exams  See your health care team every year to talk about:  Any changes in your health.  Any medicines your care team has prescribed.  Preventive care, family planning, and ways to prevent chronic diseases.  Shots (vaccines)   HPV shots (up to age 26), if you've never had them before.  Hepatitis B shots (up to age 59), if you've never had them before.  COVID-19 shot: Get this shot when it's due.  Flu shot: Get a flu shot every year.  Tetanus shot: Get a tetanus shot every 10 years.  Pneumococcal, hepatitis A, and RSV shots: Ask your care team if you need these based on your risk.  Shingles shot (for age 50 and up)  General health tests  Diabetes screening:  Starting at age 35, Get screened for diabetes at least every 3 years.  If you are younger than age 35, ask your care team if you should be screened for diabetes.  Cholesterol test: At age 39, start having a cholesterol test every 5 years, or more often if advised.  Bone density scan (DEXA): At age 50, ask your care team if you should have this scan for osteoporosis (brittle bones).  Hepatitis C: Get tested at least once in your life.  STIs (sexually  transmitted infections)  Before age 24: Ask your care team if you should be screened for STIs.  After age 24: Get screened for STIs if you're at risk. You are at risk for STIs (including HIV) if:  You are sexually active with more than one person.  You don't use condoms every time.  You or a partner was diagnosed with a sexually transmitted infection.  If you are at risk for HIV, ask about PrEP medicine to prevent HIV.  Get tested for HIV at least once in your life, whether you are at risk for HIV or not.  Cancer screening tests  Cervical cancer screening: If you have a cervix, begin getting regular cervical cancer screening tests starting at age 21.  Breast cancer scan (mammogram): If you've ever had breasts, begin having regular mammograms starting at age 40. This is a scan to check for breast cancer.  Colon cancer screening: It is important to start screening for colon cancer at age 45.  Have a colonoscopy test every 10 years (or more often if you're at risk) Or, ask your provider about stool tests like a FIT test every year or Cologuard test every 3 years.  To learn more about your testing options, visit:   .  For help making a decision, visit:   https://bit.ly/yz18418.  Prostate cancer screening test: If you have a prostate, ask your care team if a prostate cancer screening test (PSA) at age 55 is right for you.  Lung cancer screening: If you are a current or former smoker ages 50 to 80, ask your care team if ongoing lung cancer screenings are right for you.  For informational purposes only. Not to replace the advice of your health care provider. Copyright   2023 [employer]. All rights reserved. Clinically reviewed by the [facility] Transitions Program. buySAFE [id number] - REV 01/24.

## 2025-02-26 NOTE — PROGRESS NOTES
Preventive Care Visit  [facility]  Nathaniel Walker PA-C, Family Medicine  Feb 26, 2025        Assessment & Plan       ICD-10-CM    1. Encounter for Medicare annual wellness exam  Z00.00       2. HTN, goal below 140/90  I10 carvedilol (COREG) 6.25 MG tablet     chlorthalidone (HYGROTON) 25 MG tablet     lisinopril (ZESTRIL) 40 MG tablet     simvastatin (ZOCOR) 20 MG tablet     OFFICE/OUTPT VISIT,EST,LEVL III      3. At risk for cardiovascular event  Z91.89 Lipid panel reflex to direct LDL Non-fasting     simvastatin (ZOCOR) 20 MG tablet     Lipid panel reflex to direct LDL Non-fasting     OFFICE/OUTPT VISIT,EST,LEVL III      4. Screening for diabetes mellitus  Z13.1 Comprehensive metabolic panel (BMP + Alb, Alk Phos, ALT, AST, Total. Bili, TP)     Comprehensive metabolic panel (BMP + Alb, Alk Phos, ALT, AST, Total. Bili, TP)      5. Post-menopausal  Z78.0 DEXA HIP/PELVIS/SPINE - Future      6. Hip pain, right  M25.551 Orthopedic  Referral     OFFICE/OUTPT VISIT,EST,LEVL III      Talk to patient about her concerns.  Medical conditions are stable okay for refills.  I did refer her to orthopedics for her right hip.  Recheck yearly based on lab results.      Counseling  Appropriate preventive services were addressed with this patient via screening, questionnaire, or discussion as appropriate for fall prevention, nutrition, physical activity, Tobacco-use cessation, social engagement, weight loss and cognition.  Checklist reviewing preventive services available has been given to the patient.  Reviewed patient's diet, addressing concerns and/or questions.   She is at risk for lack of exercise and has been provided with information to increase physical activity for the benefit of her well-being.       Luis John is a 74 year old, presenting for the following:  Physical        2/26/2025     8:14 AM   Additional Questions   Roomed by annelise   Accompanied by           2/26/2025     8:14 AM   Patient Reported Additional Medications   Patient reports taking the following new medications no           HPI    Cont' right hip pain. Pain with walking and putting on socks. Last x-ray showed arthritis in 4/24.     Hyperlipidemia Follow-Up    Are you regularly taking any medication or supplement to lower your cholesterol?   Yes- lipitor  Are you having muscle aches or other side effects that you think could be caused by your cholesterol lowering medication?  No    Hypertension Follow-up    Do you check your blood pressure regularly outside of the clinic? Yes   Are you following a low salt diet? Yes  Are your blood pressures ever more than 140 on the top number (systolic) OR more   than 90 on the bottom number (diastolic), for example 140/90? No    Health Care Directive  Patient does not have a Health Care Directive: Discussed advance care planning with patient; information given to patient to review.      2/26/2025   General Health   How would you rate your overall physical health? (!) FAIR   Feel stress (tense, anxious, or unable to sleep) Only a little   (!) STRESS CONCERN      2/26/2025   Nutrition   Diet: Low salt         2/26/2025   Exercise   Days per week of moderate/strenous exercise 3 days   Average minutes spent exercising at this level 20 min         2/26/2025   Social Factors   Frequency of gathering with friends or relatives Once a week   Worry food won't last until get money to buy more No   Food not last or not have enough money for food? No   Do you have housing? (Housing is defined as stable permanent housing and does not include staying ouside in a car, in a tent, in an abandoned building, in an overnight shelter, or couch-surfing.) Yes   Are you worried about losing your housing? No   Lack of transportation? No   Unable to get utilities (heat,electricity)? No         2/26/2025   Fall Risk   Fallen 2 or more times in the past year? No   Trouble with walking or balance?  Yes           2/26/2025   Activities of Daily Living- Home Safety   Needs help with the following daily activites None of the above   Safety concerns in the home None of the above         2/26/2025   Dental   Dentist two times every year? Yes         2/26/2025   Hearing Screening   Hearing concerns? None of the above         2/26/2025   Driving Risk Screening   Patient/family members have concerns about driving No         2/26/2025   General Alertness/Fatigue Screening   Have you been more tired than usual lately? No         2/26/2025   Urinary Incontinence Screening   Bothered by leaking urine in past 6 months No            Today's PHQ-2 Score:       2/26/2025     8:28 AM   PHQ-2 ( 1999 [employer])   Q1: Little interest or pleasure in doing things 0   Q2: Feeling down, depressed or hopeless 0   PHQ-2 Score 0    Q1: Little interest or pleasure in doing things Not at all   Q2: Feeling down, depressed or hopeless Not at all   PHQ-2 Score 0       Patient-reported           2/26/2025   Substance Use   Alcohol more than 3/day or more than 7/wk No   Do you have a current opioid prescription? No   How severe/bad is pain from 1 to 10? 8/10   Do you use any other substances recreationally? No     Social History     Tobacco Use    Smoking status: Never    Smokeless tobacco: Never   Vaping Use    Vaping status: Never Used   Substance Use Topics    Alcohol use: No    Drug use: No          Mammogram Screening - After age 74- determine frequency with patient based on health status, life expectancy and patient goals    ASCVD Risk   The 10-year ASCVD risk score (Yin HANNON, et al., 2019) is: 26.6%    Values used to calculate the score:      Age: 74 years      Sex: Female      Is Non- : No      Diabetic: No      Tobacco smoker: No      Systolic Blood Pressure: 146 mmHg      Is BP treated: Yes      HDL Cholesterol: 87 mg/dL      Total Cholesterol: 187 mg/dL            Reviewed and updated as needed this  visit by Provider   Tobacco  Allergies  Meds  Problems  Med Hx  Surg Hx  Fam Hx            Past Medical History:   Diagnosis Date    Hypertension      Past Surgical History:   Procedure Laterality Date    APPENDECTOMY       Lab work is in process  Labs reviewed in EPIC  BP Readings from Last 3 Encounters:   02/26/25 (!) 146/75   03/20/24 128/60   02/20/24 (!) 180/80    Wt Readings from Last 3 Encounters:   02/26/25 56.7 kg (125 lb)   03/20/24 58.1 kg (128 lb)   02/20/24 59.9 kg (132 lb)                  Patient Active Problem List   Diagnosis    HTN, goal below 140/90    Benign essential hypertension    At risk for cardiovascular event    High cholesterol     Past Surgical History:   Procedure Laterality Date    APPENDECTOMY         Social History     Tobacco Use    Smoking status: Never    Smokeless tobacco: Never   Substance Use Topics    Alcohol use: No     Family History   Problem Relation Age of Onset    Hypertension Mother     Glaucoma No family hx of     Macular Degeneration No family hx of          Current Outpatient Medications   Medication Sig Dispense Refill    carvedilol (COREG) 6.25 MG tablet Take 1 tablet (6.25 mg) by mouth 2 times daily (with meals). 180 tablet 2    chlorthalidone (HYGROTON) 25 MG tablet Take 1 tablet (25 mg) by mouth daily. 90 tablet 0    DiphenhydrAMINE HCl (SIMPLY ALLERGY PO) Take by mouth daily as needed      fluticasone (FLONASE) 50 MCG/ACT spray Spray 1-2 sprays into both nostrils daily 1 Bottle 11    lisinopril (ZESTRIL) 40 MG tablet Take 1 tablet (40 mg) by mouth daily. 90 tablet 0    simvastatin (ZOCOR) 20 MG tablet Take 1 tablet (20 mg) by mouth at bedtime. 90 tablet 0     Allergies   Allergen Reactions    Norvasc [Amlodipine] Other (See Comments)     Chest pain     Current providers sharing in care for this patient include:  Patient Care Team:  Nathaniel Walker PA-C as PCP - General (Physician Assistant)  Nathaniel Walker PA-C as Assigned PCP    The  "following health maintenance items are reviewed in Epic and correct as of today:  Health Maintenance   Topic Date Due    DEXA  Never done    ANNUAL REVIEW OF HM ORDERS  Never done    ZOSTER IMMUNIZATION (1 of 2) Never done    Pneumococcal Vaccine: 50+ Years (2 of 2 - PPSV23) 06/14/2019    RSV VACCINE (1 - 1-dose 75+ series) Never done    INFLUENZA VACCINE (1) 09/01/2024    LIPID  02/20/2025    BMP  03/20/2025    COLORECTAL CANCER SCREENING  08/23/2025    COVID-19 Vaccine (8 - 2024-25 season) 08/26/2025    MEDICARE ANNUAL WELLNESS VISIT  02/26/2026    FALL RISK ASSESSMENT  02/26/2026    GLUCOSE  03/20/2027    ADVANCE CARE PLANNING  02/15/2028    DTAP/TDAP/TD IMMUNIZATION (2 - Td or Tdap) 06/14/2028    HEPATITIS C SCREENING  Completed    PHQ-2 (once per calendar year)  Completed    HPV IMMUNIZATION  Aged Out    MENINGITIS IMMUNIZATION  Aged Out         Review of Systems  Constitutional, HEENT, cardiovascular, pulmonary, gi and gu systems are negative, except as otherwise noted.     Objective    Exam  BP (!) 146/75   Pulse 61   Temp 97  F (36.1  C) (Tympanic)   Resp 16   Ht 1.575 m (5' 2\")   Wt 56.7 kg (125 lb)   SpO2 96%   BMI 22.86 kg/m     Estimated body mass index is 22.86 kg/m  as calculated from the following:    Height as of this encounter: 1.575 m (5' 2\").    Weight as of this encounter: 56.7 kg (125 lb).    Physical Exam  GENERAL: alert and no distress  EYES: Eyes grossly normal to inspection, PERRL and conjunctivae and sclerae normal  HENT: ear canals and TM's normal, nose and mouth without ulcers or lesions  NECK: no adenopathy, no asymmetry, masses, or scars  RESP: lungs clear to auscultation - no rales, rhonchi or wheezes  CV: regular rate and rhythm, normal S1 S2, no S3 or S4, no murmur, click or rub, no peripheral edema  ABDOMEN: soft, nontender, no hepatosplenomegaly, no masses and bowel sounds normal  MS: no gross musculoskeletal defects noted, no edema  SKIN: no suspicious lesions or " lizzeth  NEURO: Normal strength and tone, mentation intact and speech normal  PSYCH: mentation appears normal, affect normal/bright  Generalized decreased range of motion in her right hip with flexion and internal and external rotation causing pain.        2/26/2025   Mini Cog   Mini-Cog Not Completed (choose reason) Patient declines       Patient declines, there are NO concerns for cognitive deficits.           Signed Electronically by: Nathaniel Walker PA-C

## 2025-05-31 DIAGNOSIS — Z91.89 AT RISK FOR CARDIOVASCULAR EVENT: ICD-10-CM

## 2025-05-31 DIAGNOSIS — I10 HTN, GOAL BELOW 140/90: ICD-10-CM

## 2025-06-02 RX ORDER — SIMVASTATIN 20 MG
20 TABLET ORAL AT BEDTIME
Qty: 90 TABLET | Refills: 1 | Status: SHIPPED | OUTPATIENT
Start: 2025-06-02

## 2025-06-02 RX ORDER — CHLORTHALIDONE 25 MG/1
25 TABLET ORAL DAILY
Qty: 90 TABLET | Refills: 0 | Status: SHIPPED | OUTPATIENT
Start: 2025-06-02

## 2025-06-02 RX ORDER — LISINOPRIL 40 MG/1
40 TABLET ORAL DAILY
Qty: 90 TABLET | Refills: 0 | Status: SHIPPED | OUTPATIENT
Start: 2025-06-02

## 2025-06-03 ENCOUNTER — TELEPHONE (OUTPATIENT)
Dept: FAMILY MEDICINE | Facility: CLINIC | Age: 75
End: 2025-06-03
Payer: COMMERCIAL

## 2025-06-03 NOTE — TELEPHONE ENCOUNTER
Patient Quality Outreach    Patient is due for the following:   Hypertension -  BP check    Action(s) Taken:   No follow up needed at this time.    Type of outreach:    None needed per Provider note follow up 02/26/2026    Questions for provider review:    None         Josefina Son MA  Chart routed to None.

## 2025-07-16 ENCOUNTER — OFFICE VISIT (OUTPATIENT)
Dept: FAMILY MEDICINE | Facility: CLINIC | Age: 75
End: 2025-07-16
Payer: COMMERCIAL

## 2025-07-16 ENCOUNTER — RESULTS FOLLOW-UP (OUTPATIENT)
Dept: FAMILY MEDICINE | Facility: CLINIC | Age: 75
End: 2025-07-16

## 2025-07-16 VITALS
BODY MASS INDEX: 22.16 KG/M2 | DIASTOLIC BLOOD PRESSURE: 72 MMHG | SYSTOLIC BLOOD PRESSURE: 136 MMHG | WEIGHT: 120.4 LBS | RESPIRATION RATE: 17 BRPM | OXYGEN SATURATION: 98 % | TEMPERATURE: 97.9 F | HEART RATE: 77 BPM | HEIGHT: 62 IN

## 2025-07-16 DIAGNOSIS — I10 HTN, GOAL BELOW 140/90: Primary | ICD-10-CM

## 2025-07-16 DIAGNOSIS — E78.00 HIGH CHOLESTEROL: ICD-10-CM

## 2025-07-16 DIAGNOSIS — Z91.89 AT RISK FOR CARDIOVASCULAR EVENT: ICD-10-CM

## 2025-07-16 LAB
ALBUMIN SERPL BCG-MCNC: 4.3 G/DL (ref 3.5–5.2)
ALP SERPL-CCNC: 74 U/L (ref 40–150)
ALT SERPL W P-5'-P-CCNC: 12 U/L (ref 0–50)
ANION GAP SERPL CALCULATED.3IONS-SCNC: 11 MMOL/L (ref 7–15)
AST SERPL W P-5'-P-CCNC: 19 U/L (ref 0–45)
BILIRUB SERPL-MCNC: 0.3 MG/DL
BUN SERPL-MCNC: 20.3 MG/DL (ref 8–23)
CALCIUM SERPL-MCNC: 10.3 MG/DL (ref 8.8–10.4)
CHLORIDE SERPL-SCNC: 100 MMOL/L (ref 98–107)
CREAT SERPL-MCNC: 0.99 MG/DL (ref 0.51–0.95)
EGFRCR SERPLBLD CKD-EPI 2021: 59 ML/MIN/1.73M2
GLUCOSE SERPL-MCNC: 104 MG/DL (ref 70–99)
HCO3 SERPL-SCNC: 25 MMOL/L (ref 22–29)
POTASSIUM SERPL-SCNC: 3.8 MMOL/L (ref 3.4–5.3)
PROT SERPL-MCNC: 7.5 G/DL (ref 6.4–8.3)
SODIUM SERPL-SCNC: 136 MMOL/L (ref 135–145)

## 2025-07-16 PROCEDURE — 3078F DIAST BP <80 MM HG: CPT | Performed by: PHYSICIAN ASSISTANT

## 2025-07-16 PROCEDURE — 99214 OFFICE O/P EST MOD 30 MIN: CPT | Performed by: PHYSICIAN ASSISTANT

## 2025-07-16 PROCEDURE — 1125F AMNT PAIN NOTED PAIN PRSNT: CPT | Performed by: PHYSICIAN ASSISTANT

## 2025-07-16 PROCEDURE — 3075F SYST BP GE 130 - 139MM HG: CPT | Performed by: PHYSICIAN ASSISTANT

## 2025-07-16 PROCEDURE — 80053 COMPREHEN METABOLIC PANEL: CPT | Performed by: PHYSICIAN ASSISTANT

## 2025-07-16 PROCEDURE — G2211 COMPLEX E/M VISIT ADD ON: HCPCS | Performed by: PHYSICIAN ASSISTANT

## 2025-07-16 PROCEDURE — 36415 COLL VENOUS BLD VENIPUNCTURE: CPT | Performed by: PHYSICIAN ASSISTANT

## 2025-07-16 RX ORDER — CARVEDILOL 6.25 MG/1
6.25 TABLET ORAL 2 TIMES DAILY WITH MEALS
Qty: 180 TABLET | Refills: 1 | Status: SHIPPED | OUTPATIENT
Start: 2025-07-16

## 2025-07-16 RX ORDER — LISINOPRIL 40 MG/1
40 TABLET ORAL DAILY
Qty: 90 TABLET | Refills: 1 | Status: SHIPPED | OUTPATIENT
Start: 2025-07-16

## 2025-07-16 RX ORDER — SIMVASTATIN 20 MG
20 TABLET ORAL AT BEDTIME
Qty: 90 TABLET | Refills: 1 | Status: SHIPPED | OUTPATIENT
Start: 2025-07-16

## 2025-07-16 RX ORDER — CHLORTHALIDONE 25 MG/1
25 TABLET ORAL DAILY
Qty: 90 TABLET | Refills: 1 | Status: SHIPPED | OUTPATIENT
Start: 2025-07-16

## 2025-07-16 ASSESSMENT — PAIN SCALES - GENERAL: PAINLEVEL_OUTOF10: SEVERE PAIN (8)

## 2025-07-16 NOTE — LETTER
July 16, 2025      Dora Luna  [address] [facility] [address]        Dear ,    We are writing to inform you of your test results.    Your test results fall within the expected range(s) or remain unchanged from previous results.  Please continue with current treatment plan.    Resulted Orders   Comprehensive metabolic panel (BMP + Alb, Alk Phos, ALT, AST, Total. Bili, TP)   Result Value Ref Range    Sodium 136 135 - 145 mmol/L    Potassium 3.8 3.4 - 5.3 mmol/L    Carbon Dioxide (CO2) 25 22 - 29 mmol/L    Anion Gap 11 7 - 15 mmol/L    Urea Nitrogen 20.3 8.0 - 23.0 mg/dL    Creatinine 0.99 (H) 0.51 - 0.95 mg/dL    GFR Estimate 59 (L) >60 mL/min/1.73m2      Comment:      eGFR calculated using 2021 CKD-EPI equation.    Calcium 10.3 8.8 - 10.4 mg/dL    Chloride 100 98 - 107 mmol/L    Glucose 104 (H) 70 - 99 mg/dL    Alkaline Phosphatase 74 40 - 150 U/L    AST 19 0 - 45 U/L    ALT 12 0 - 50 U/L    Protein Total 7.5 6.4 - 8.3 g/dL    Albumin 4.3 3.5 - 5.2 g/dL    Bilirubin Total 0.3 <=1.2 mg/dL       If you have any questions or concerns, please call the clinic at the number listed above.       Sincerely,      Nathaniel Walker PA-C    Electronically signed

## 2025-07-16 NOTE — PROGRESS NOTES
"  Assessment & Plan       ICD-10-CM    1. HTN, goal below 140/90  I10 carvedilol (COREG) 6.25 MG tablet     chlorthalidone (HYGROTON) 25 MG tablet     lisinopril (ZESTRIL) 40 MG tablet     simvastatin (ZOCOR) 20 MG tablet     Comprehensive metabolic panel (BMP + Alb, Alk Phos, ALT, AST, Total. Bili, TP)     Comprehensive metabolic panel (BMP + Alb, Alk Phos, ALT, AST, Total. Bili, TP)      2. At risk for cardiovascular event  Z91.89 simvastatin (ZOCOR) 20 MG tablet      3. High cholesterol  E78.00       Work on Healthy diet and exercise. Getting heart rate elevated for 30 mins most days of week.  medical conditions are stable. meds refilled.  Labs pending  Recheck 6 months      Subjective   Dora is a 75 year old, presenting for the following health issues:  Recheck Medication, Hypertension, and Lipids        7/16/2025     7:01 AM   Additional Questions   Roomed by Nina   Accompanied by spouse     History of Present Illness       Hyperlipidemia:  She presents for follow up of hyperlipidemia.   She is taking medication to lower cholesterol. She is not having myalgia or other side effects to statin medications.    Hypertension: She presents for follow up of hypertension.  She does check blood pressure  regularly outside of the clinic. Outside blood pressures have been over 140/90. She does not follow a low salt diet.        Con't to have right hip pain. She hasn't seen ortho yet.   Pain with walking and stairs.     Review of Systems  Constitutional, HEENT, cardiovascular, pulmonary, gi and gu systems are negative, except as otherwise noted.      Objective    /72   Pulse 77   Temp 97.9  F (36.6  C) (Tympanic)   Resp 17   Ht 1.575 m (5' 2\")   Wt 54.6 kg (120 lb 6.4 oz)   SpO2 98%   BMI 22.02 kg/m    Body mass index is 22.02 kg/m .  Physical Exam   GENERAL: alert and no distress  EYES: Eyes grossly normal to inspection, PERRL and conjunctivae and sclerae normal  HENT: ear canals and TM's normal, nose and " mouth without ulcers or lesions  NECK: no adenopathy, no asymmetry, masses, or scars  RESP: lungs clear to auscultation - no rales, rhonchi or wheezes  CV: regular rate and rhythm, normal S1 S2, no S3 or S4, no murmur, click or rub, no peripheral edema  ABDOMEN: soft, nontender, no hepatosplenomegaly, no masses and bowel sounds normal  MS: no gross musculoskeletal defects noted, no edema  SKIN: no suspicious lesions or rashes  NEURO: Normal strength and tone, mentation intact and speech normal  PSYCH: mentation appears normal, affect normal/bright    Labs pending        Signed Electronically by: Nathaniel Walker PA-C

## 2025-07-16 NOTE — RESULT ENCOUNTER NOTE
Ms. Cheryl,    All of your labs were normal/near normal for you.    Please contact the clinic if you have additional questions.  Thank you.    Sincerely,    Nathaniel Walker PA-C        Erythromycin Pregnancy And Lactation Text: This medication is Pregnancy Category B and is considered safe during pregnancy. It is also excreted in breast milk.

## 2025-07-17 ENCOUNTER — OFFICE VISIT (OUTPATIENT)
Dept: ORTHOPEDICS | Facility: CLINIC | Age: 75
End: 2025-07-17
Attending: PHYSICIAN ASSISTANT
Payer: COMMERCIAL

## 2025-07-17 ENCOUNTER — ANCILLARY PROCEDURE (OUTPATIENT)
Dept: GENERAL RADIOLOGY | Facility: CLINIC | Age: 75
End: 2025-07-17
Attending: ORTHOPAEDIC SURGERY
Payer: COMMERCIAL

## 2025-07-17 VITALS
HEART RATE: 104 BPM | SYSTOLIC BLOOD PRESSURE: 105 MMHG | DIASTOLIC BLOOD PRESSURE: 64 MMHG | OXYGEN SATURATION: 98 % | HEIGHT: 62 IN | WEIGHT: 120 LBS | BODY MASS INDEX: 22.08 KG/M2

## 2025-07-17 DIAGNOSIS — M25.551 HIP PAIN, RIGHT: ICD-10-CM

## 2025-07-17 DIAGNOSIS — M16.11 PRIMARY OSTEOARTHRITIS OF RIGHT HIP: Primary | ICD-10-CM

## 2025-07-17 DIAGNOSIS — M16.11 PRIMARY OSTEOARTHRITIS OF RIGHT HIP: ICD-10-CM

## 2025-07-17 ASSESSMENT — PAIN SCALES - GENERAL: PAINLEVEL_OUTOF10: SEVERE PAIN (8)

## 2025-07-17 NOTE — PATIENT INSTRUCTIONS
Patient Education   Learning About Total Hip Replacement Surgery  What is total hip replacement surgery?     During total hip replacement surgery, your doctor replaces the worn parts of your hip joint with artificial parts made of metal, ceramic, or plastic.  You may want this surgery if you have hip pain and trouble moving that you can't treat in other ways. Osteoarthritis or rheumatoid arthritis can cause these types of problems. Another cause is bone loss due to a poor blood supply.  Hip replacement is sometimes done after a hip fracture.  How is this surgery done?  Hip replacement surgery is done through one or two cuts (incisions). The cuts may be toward the front (anterior) of your hip, or they may be on the side or toward the back (posterior). You and your doctor can discuss which surgery is best for you.  You may have anesthesia to block pain and medicine to make you drowsy. Or you may get medicine to make you sleep. After making the incision, your doctor will:  Remove worn bone tissue and cartilage from the hip joint.  Replace the ball at the upper end of your thighbone (femur).  Replace your hip socket with a shell and liner.  Fit the ball into the shell and liner to make a new hip joint.  There are two kinds of replacement joints.  Cemented joints. The cement fits between the new joint and the bone.  Uncemented joints. These have a metal coating with many small openings. The bone is shaped to fit the new joint almost perfectly. At first, there will be some small spaces between the bone and the new joint. Over time, the bone grows to fill these small openings.  Sometimes a doctor uses a cemented ball and an uncemented socket.  Your doctor can tell you which type of new hip joint is best for you.  What can you expect after a total hip replacement?  On the day of surgery, you'll learn how to get in and out of bed. You'll also learn how to walk with a walker, crutches, or a cane. By the time you leave the  "hospital, you'll be able to safely sit down and stand up, dress yourself, use the toilet, and bathe.  You'll also start physical therapy. Your therapist will teach you exercises to help you get stronger. You'll learn ways to move your body without dislocating your hip.  During the first week or so after surgery, you will need less and less pain medicine. For a few weeks after surgery, you will probably take medicine to prevent blood clots.  Your doctor will tell you when you can walk on your own, drive, return to work, and get back to other activities.  It usually takes a few months to get back to full activity.  Follow-up care is a key part of your treatment and safety. Be sure to make and go to all appointments, and call your doctor if you are having problems. It's also a good idea to know your test results and keep a list of the medicines you take.  Where can you learn more?  Go to https://www.[employer].net/patiented  Enter A884 in the search box to learn more about \"Learning About Total Hip Replacement Surgery.\"  Current as of: July 31, 2024  Content Version: 14.5    1450-4067 [employer].   Care instructions adapted under license by your healthcare professional. If you have questions about a medical condition or this instruction, always ask your healthcare professional. [employer] disclaims any warranty or liability for your use of this information.       "

## 2025-07-17 NOTE — Clinical Note
7/17/2025      Dora Luna  [address] [facility] [address]      Dear Colleague,    Thank you for referring your patient, Dora Luna, to the [facility]. Please see a copy of my visit note below.    SUBJECTIVE:   Dora Luna is a 75 year old female who is seen in consultation at the request of ANGLE Monk  for evaluation of right hip pain.   History: some pain for years, but pain worse the past 5-6  months    Present symptoms: pain in the groin.   Difficult to garden  Difficult to put on shoe, sock.  Pain goes down to ankle. At times that pain can be sharp.   No history of low back pain .    Treatments tried to this point: Tylenol arthritis    Orthopedic PMH: history of knee osteoarthritis     Review of Systems:  Constitutional:  NEGATIVE for fever, chills, change in weight  Integumentary/Skin:  NEGATIVE for worrisome rashes, moles or lesions  Eyes:  NEGATIVE for vision changes or irritation  ENT/Mouth:  NEGATIVE for ear, mouth and throat problems  Resp:  NEGATIVE for significant cough or SOB  Breast:  NEGATIVE for masses, tenderness or discharge  CV:  NEGATIVE for chest pain, palpitations or peripheral edema  GI:  NEGATIVE for nausea, abdominal pain, heartburn, or change in bowel habits  :  Negative   Musculoskeletal:  See HPI above  Neuro:  NEGATIVE for weakness, dizziness or paresthesias  Endocrine:  NEGATIVE for temperature intolerance, skin/hair changes  Heme/allergy/immune:  NEGATIVE for bleeding problems  Psychiatric:  NEGATIVE for changes in mood or affect    Past Medical History:   Past Medical History:   Diagnosis Date    Hypertension      Past Surgical History:   Past Surgical History:   Procedure Laterality Date    APPENDECTOMY       Family History:   Family History   Problem Relation Age of Onset    Hypertension Mother     Glaucoma No family hx of     Macular Degeneration No family hx of      Social History:   Social History     Tobacco Use     "Smoking status: Never    Smokeless tobacco: Never   Substance Use Topics    Alcohol use: No       OBJECTIVE:  Physical Exam:  /64 (BP Location: Left arm, Patient Position: Sitting, Cuff Size: Adult Regular)   Pulse 104   Ht 1.575 m (5' 2\")   Wt 54.4 kg (120 lb)   SpO2 98%   BMI 21.95 kg/m    General Appearance: healthy, alert and no distress   Skin: no suspicious lesions or rashes  Neuro: Normal strength and tone, mentation intact and speech normal  Vascular: good pulses, and cappillary refill   Lymph: no lymphadenopathy   Psych:  mentation appears normal and affect normal/bright  Resp: no increased work of breathing     Right Hip Exam:  Lumbar range of motion:limited by hip pain     Toe stand: Able  Heel stand: Able  Seated SLR: positive  Supine SLR: too much hip pain\  Sensation: intact  Motor: non focal     Tender: greater trochanter  Hip range of motion:    Flexion: 80, with 10 degree flexion contracture   Internal Rotation: 15* degrees   External Rotation: 15* degrees   Abduction: 25* degrees    Strength: some hip flexor weakness.    Trendelenburg: negative  Leg lengths: ***    X-rays:  Obtained 2/20/24 of the right hip: Reviewed in the office with the patient by myself today and show   Moderate to severe right hip arthrosis. No acute fracture or  dislocation. Atherosclerotic vascular calcifications.       ASSESSMENT:   Severe right hip osteoarthritis     PLAN:   Total Hip Arthroplasty:   We talked about the patient's condition and diagnosis.  Because of severe arthritis, and failure of conservative measures, I have suggested right total hip arthroplasty.  I've talked in depth about the procedure and the risks, which include, but are not limited to blood loss requiring transfusion, infection, neurovascular injury, DVT, PE, pain, (both perioperative and persistent post-recovery pain), dislocation, leg length discrepancy, intra-operative fracture, potential anesthetic and perioperative medical " complications, and the possibility of needing additional procedures. We also talked about recovery time, which differs from patient to patient.  We've encouraged attendance at the arthroplasty class at the hospital.  Medical clearance will need to be obtained.      The overall risk for thromboembolic events in this patient is low , and *** ASA will be used for postoperative prophylaxis.    Other special considerations***       Return to clinic: ***    SERGE Whalen MD  Dept. Orthopedic Surgery  [facility]          Again, thank you for allowing me to participate in the care of your patient.        Sincerely,        Jayson Whalen MD    Electronically signed

## 2025-07-17 NOTE — PROGRESS NOTES
SUBJECTIVE:   Dora Luna is a 75 year old female who is seen in consultation at the request of ANGLE Monk  for evaluation of right hip pain.   History: some pain for years, but pain worse the past 5-6  months    Present symptoms: pain in the groin.   Difficult to garden  Difficult to put on shoe, sock.  Pain goes down to ankle. At times that pain can be sharp.   Pain at night. Affects sleep.  Pain walking any distance. Uses cane occasionally.  No history of low back pain .    Treatments tried to this point: Tylenol arthritis    Orthopedic PMH: history of knee osteoarthritis     Review of Systems:  Constitutional:  NEGATIVE for fever, chills, change in weight  Integumentary/Skin:  NEGATIVE for worrisome rashes, moles or lesions  Eyes:  NEGATIVE for vision changes or irritation  ENT/Mouth:  NEGATIVE for ear, mouth and throat problems  Resp:  NEGATIVE for significant cough or SOB  Breast:  NEGATIVE for masses, tenderness or discharge  CV:  NEGATIVE for chest pain, palpitations or peripheral edema  GI:  NEGATIVE for nausea, abdominal pain, heartburn, or change in bowel habits  :  Negative   Musculoskeletal:  See HPI above  Neuro:  NEGATIVE for weakness, dizziness or paresthesias  Endocrine:  NEGATIVE for temperature intolerance, skin/hair changes  Heme/allergy/immune:  NEGATIVE for bleeding problems  Psychiatric:  NEGATIVE for changes in mood or affect    Past Medical History:   Past Medical History:   Diagnosis Date    Hypertension      Past Surgical History:   Past Surgical History:   Procedure Laterality Date    APPENDECTOMY       Family History:   Family History   Problem Relation Age of Onset    Hypertension Mother     Glaucoma No family hx of     Macular Degeneration No family hx of      Social History:   Social History     Tobacco Use    Smoking status: Never    Smokeless tobacco: Never   Substance Use Topics    Alcohol use: No       OBJECTIVE:  Physical Exam:  /64 (BP Location: Left arm,  "Patient Position: Sitting, Cuff Size: Adult Regular)   Pulse 104   Ht 1.575 m (5' 2\")   Wt 54.4 kg (120 lb)   SpO2 98%   BMI 21.95 kg/m    General Appearance: healthy, alert and no distress   Skin: no suspicious lesions or rashes  Neuro: Normal strength and tone, mentation intact and speech normal  Vascular: good pulses, and cappillary refill   Lymph: no lymphadenopathy   Psych:  mentation appears normal and affect normal/bright  Resp: no increased work of breathing     Right Hip Exam:  Lumbar range of motion:limited by hip pain     Toe stand: Able  Heel stand: Able  Seated SLR: positive  Supine SLR: too much hip pain\  Sensation: intact  Motor: non focal     Tender: greater trochanter  Hip range of motion:    Flexion: 80, with 10 degree flexion contracture   Internal Rotation: 15* degrees   External Rotation: 15* degrees   Abduction: 25* degrees    Strength: some hip flexor weakness.    Trendelenburg: negative      X-rays:  Obtained 2/20/24 of the right hip: Reviewed in the office with the patient by myself today and show   Moderate to severe right hip arthrosis. No acute fracture or  dislocation. Atherosclerotic vascular calcifications.    Xrays from today:   significant advancement of her right hip osteoarthritis with complete obliteration of the superior joint space and bipolar degenerative cysts noted.     ASSESSMENT:   Severe right hip osteoarthritis     PLAN:   Total Hip Arthroplasty:   We talked about the patient's condition and diagnosis.  Because of severe arthritis, and failure of conservative measures, and the futility of further non-op treatment.  I have suggested right total hip arthroplasty.  I've talked in depth about the procedure and the risks, which include, but are not limited to blood loss requiring transfusion, infection, neurovascular injury, DVT, PE, pain, (both perioperative and persistent post-recovery pain), dislocation, leg length discrepancy, intra-operative fracture, potential " anesthetic and perioperative medical complications, and the possibility of needing additional procedures. We also talked about recovery time, which differs from patient to patient.  We've encouraged attendance at the arthroplasty class at the hospital.  Medical clearance will need to be obtained.    We discussed the pros and cons of the anterior approach.    The overall risk for thromboembolic events in this patient is low , and  ASA will be used for postoperative prophylaxis.  **She reports ringing in her ears with aspirin, but hasn't taken this for a while.  I've asked her to try it for a couple of days. If she has problems, then we will use Eliquis postoperative instead.     Other special considerations ***         SERGE Whalen MD  Dept. Orthopedic Surgery  [facility]

## 2025-07-19 ENCOUNTER — TELEPHONE (OUTPATIENT)
Dept: ORTHOPEDICS | Facility: CLINIC | Age: 75
End: 2025-07-19
Payer: COMMERCIAL

## 2025-07-19 NOTE — TELEPHONE ENCOUNTER
Other: Patient would like to schedule R hip replacement surgery with Dr. Whalen. Please call patient to schedule.      Could we send this information to you in [employer] or would you prefer to receive a phone call?:   Patient would prefer a phone call   Okay to leave a detailed message?: Yes at Cell number on file:    Telephone Information:   Mobile [phone]        Skin normal color for race, warm, dry and intact. No evidence of rash.

## 2025-07-21 ENCOUNTER — PREP FOR PROCEDURE (OUTPATIENT)
Dept: ORTHOPEDICS | Facility: CLINIC | Age: 75
End: 2025-07-21
Payer: COMMERCIAL

## 2025-07-21 DIAGNOSIS — M16.11 OSTEOARTHRITIS OF RIGHT HIP: Primary | ICD-10-CM

## 2025-07-26 ENCOUNTER — TELEPHONE (OUTPATIENT)
Dept: ORTHOPEDICS | Facility: CLINIC | Age: 75
End: 2025-07-26
Payer: COMMERCIAL

## 2025-07-26 ENCOUNTER — NURSE TRIAGE (OUTPATIENT)
Dept: NURSING | Facility: CLINIC | Age: 75
End: 2025-07-26
Payer: COMMERCIAL

## 2025-07-26 DIAGNOSIS — M25.551 CHRONIC RIGHT HIP PAIN: Primary | ICD-10-CM

## 2025-07-26 DIAGNOSIS — G89.29 CHRONIC RIGHT HIP PAIN: Primary | ICD-10-CM

## 2025-07-26 RX ORDER — TRAMADOL HYDROCHLORIDE 50 MG/1
50 TABLET ORAL EVERY 8 HOURS PRN
Qty: 6 TABLET | Refills: 0 | Status: SHIPPED | OUTPATIENT
Start: 2025-07-26 | End: 2025-07-29

## 2025-07-26 NOTE — TELEPHONE ENCOUNTER
"Nurse Triage SBAR    Is this a 2nd Level Triage? NO    Situation:   Spoke with 75 yr old Dora who c/o:    Patient having severe R hip pain extending down into the thigh.  Patient taking Tylenol Arthritis for pain.  Ibuprofen upsets the stomach.  Rates current pain a \"10\" on a 0-10 pain scale.  Patient requesting pain medication while awaiting surgery.  Patient states she is not able to do any of her usual activities and is also having problems sleeping due to pain.  Patient state she can bear weight and walk if she has to, but it is very painful.      Background:   Hx of arthritis.  Patient is awaiting hip replacement surgery.  Patient was evaluated 7/17/25 at [facility] by Dr. Jayson Whalen Ortho surgery for worsening hip pain.    Assessment:   Evaluation or specialty response needed.    Protocol Recommended Disposition:   See HCP Within 4 Hours (Or PCP Triage)    Recommendation:   Advised OV within the next 4 hours per protocol.  Patient would like to speak with orthopedics to avoid OV if possible.  Transferred patient to answering service to page orthopedic surgeon on call.    Deanna Pinzon RN  [facility]     Reason for Disposition   [1] SEVERE pain (e.g., excruciating, unable to do any normal activities) AND [2] not improved after 2 hours of pain medicine    Additional Information   Negative: Looks like a broken bone or dislocated joint (e.g., crooked or deformed)   Negative: Sounds like a life-threatening emergency to the triager   Negative: Followed a hip injury   Negative: Leg pain is main symptom   Negative: Back pain radiating (shooting) into hip   Negative: [1] SEVERE pain (e.g., excruciating, unable to do any normal activities) AND [2] fever   Negative: Can't stand (bear weight) or walk   Negative: [1] Red area or streak AND [2] fever   Negative: Patient sounds very sick or weak to the triager    Protocols used: Hip Pain-A-AH    "

## 2025-07-26 NOTE — TELEPHONE ENCOUNTER
Called and spoke to patient. Patient awaiting for surgery on her hip with Dr. Suzan Whalen, has NOT had surgery yet nor is it scheduled. Has hip pain. Hasn't reached out to primary care provider about pain medications but rather the surgeon for reason unknown.    Stated typically pain control from the surgeon is for after surgery and any pain control prior to surgery should come from their primary care provider.     Also advised that using prescription pain medications prior to surgery can cause tolerance such that postoperative pain management afterwards can be more challenging and less effective, and that over the counter pain medications would be recommended.    I offered a few pain pills for the weekend, but that they should reach out to their primary care provider for any further longterm pain management up until she has surgery with Dr. Suzan Whalen. They can reach out to Dr Whalen as well as his policy may be different than mine. Patient and  state they are in understanding of this policy.    Will escribe a few [facility]adol to [facility] [place]. ([facility] [place] but there isn't a [facility] in [place])    Patrick Stein M.D., M.S.  Dept. of Orthopaedic Surgery  [facility]

## 2025-07-30 ENCOUNTER — TELEPHONE (OUTPATIENT)
Dept: ORTHOPEDICS | Facility: CLINIC | Age: 75
End: 2025-07-30
Payer: COMMERCIAL

## 2025-07-30 NOTE — TELEPHONE ENCOUNTER
Type of surgery: ARTHROPLASTY, HIP, TOTAL RIGHT   Location of surgery: [facility]  Date and time of surgery: 08/18/2025  Surgeon: Koby  Pre-Op Appt Date: 08/12/2025  Post-Op Appt Date: 09/04/2025   Packet sent out: Yes / mail   Pre-cert/Authorization completed:  No  Date:

## 2025-07-30 NOTE — TELEPHONE ENCOUNTER
Returning patients call no answer and no v/m and no mychart, unable to leave a message [phone]

## 2025-07-30 NOTE — TELEPHONE ENCOUNTER
Called patient there was no answer [facility] , I will call patient again tomorrow

## 2025-07-30 NOTE — TELEPHONE ENCOUNTER
Other: Patient leaving another message for surgery scheduling. Please call back asap.      Could we send this information to you in [employer] or would you prefer to receive a phone call?:   Patient would prefer a phone call   Okay to leave a detailed message?: Yes at Cell number on file:    Telephone Information:   Mobile [phone]

## 2025-08-12 ENCOUNTER — OFFICE VISIT (OUTPATIENT)
Dept: FAMILY MEDICINE | Facility: CLINIC | Age: 75
End: 2025-08-12
Payer: COMMERCIAL

## 2025-08-12 VITALS
OXYGEN SATURATION: 100 % | DIASTOLIC BLOOD PRESSURE: 64 MMHG | HEART RATE: 72 BPM | HEIGHT: 63 IN | RESPIRATION RATE: 16 BRPM | SYSTOLIC BLOOD PRESSURE: 138 MMHG | TEMPERATURE: 97.4 F | WEIGHT: 119 LBS | BODY MASS INDEX: 21.09 KG/M2

## 2025-08-12 DIAGNOSIS — M25.551 HIP PAIN, RIGHT: ICD-10-CM

## 2025-08-12 DIAGNOSIS — Z01.818 PREOP GENERAL PHYSICAL EXAM: Primary | ICD-10-CM

## 2025-08-12 DIAGNOSIS — I10 BENIGN ESSENTIAL HYPERTENSION: ICD-10-CM

## 2025-08-12 LAB
ANION GAP SERPL CALCULATED.3IONS-SCNC: 13 MMOL/L (ref 7–15)
BUN SERPL-MCNC: 22.2 MG/DL (ref 8–23)
CALCIUM SERPL-MCNC: 10.2 MG/DL (ref 8.8–10.4)
CHLORIDE SERPL-SCNC: 99 MMOL/L (ref 98–107)
CREAT SERPL-MCNC: 1.07 MG/DL (ref 0.51–0.95)
EGFRCR SERPLBLD CKD-EPI 2021: 54 ML/MIN/1.73M2
GLUCOSE SERPL-MCNC: 111 MG/DL (ref 70–99)
HCO3 SERPL-SCNC: 23 MMOL/L (ref 22–29)
HGB BLD-MCNC: 11.5 G/DL (ref 11.7–15.7)
MCV RBC AUTO: 94 FL (ref 78–100)
POTASSIUM SERPL-SCNC: 4 MMOL/L (ref 3.4–5.3)
SODIUM SERPL-SCNC: 135 MMOL/L (ref 135–145)

## 2025-08-12 PROCEDURE — 93000 ELECTROCARDIOGRAM COMPLETE: CPT | Performed by: PHYSICIAN ASSISTANT

## 2025-08-12 PROCEDURE — 3078F DIAST BP <80 MM HG: CPT | Performed by: PHYSICIAN ASSISTANT

## 2025-08-12 PROCEDURE — 36415 COLL VENOUS BLD VENIPUNCTURE: CPT | Performed by: PHYSICIAN ASSISTANT

## 2025-08-12 PROCEDURE — 1125F AMNT PAIN NOTED PAIN PRSNT: CPT | Performed by: PHYSICIAN ASSISTANT

## 2025-08-12 PROCEDURE — 99214 OFFICE O/P EST MOD 30 MIN: CPT | Performed by: PHYSICIAN ASSISTANT

## 2025-08-12 PROCEDURE — 3075F SYST BP GE 130 - 139MM HG: CPT | Performed by: PHYSICIAN ASSISTANT

## 2025-08-12 PROCEDURE — 85018 HEMOGLOBIN: CPT | Performed by: PHYSICIAN ASSISTANT

## 2025-08-12 PROCEDURE — 80048 BASIC METABOLIC PNL TOTAL CA: CPT | Performed by: PHYSICIAN ASSISTANT

## 2025-08-12 ASSESSMENT — PAIN SCALES - GENERAL: PAINLEVEL_OUTOF10: SEVERE PAIN (9)

## 2025-08-14 ENCOUNTER — TELEPHONE (OUTPATIENT)
Dept: FAMILY MEDICINE | Facility: CLINIC | Age: 75
End: 2025-08-14
Payer: COMMERCIAL

## 2025-08-15 ENCOUNTER — ANESTHESIA EVENT (OUTPATIENT)
Dept: SURGERY | Facility: CLINIC | Age: 75
End: 2025-08-15
Payer: COMMERCIAL

## 2025-08-18 ENCOUNTER — ANESTHESIA (OUTPATIENT)
Dept: SURGERY | Facility: CLINIC | Age: 75
End: 2025-08-18
Payer: COMMERCIAL

## 2025-08-18 ENCOUNTER — HOSPITAL ENCOUNTER (OUTPATIENT)
Facility: CLINIC | Age: 75
Discharge: HOME | End: 2025-08-19
Attending: ORTHOPAEDIC SURGERY | Admitting: ORTHOPAEDIC SURGERY
Payer: COMMERCIAL

## 2025-08-18 ENCOUNTER — APPOINTMENT (OUTPATIENT)
Dept: GENERAL RADIOLOGY | Facility: CLINIC | Age: 75
End: 2025-08-18
Attending: ORTHOPAEDIC SURGERY
Payer: COMMERCIAL

## 2025-08-18 DIAGNOSIS — Z96.641 STATUS POST TOTAL REPLACEMENT OF RIGHT HIP: Primary | ICD-10-CM

## 2025-08-18 PROBLEM — E78.00 HIGH CHOLESTEROL: Status: ACTIVE | Noted: 2021-10-13

## 2025-08-18 LAB — GLUCOSE BLDC GLUCOMTR-MCNC: 100 MG/DL (ref 70–99)

## 2025-08-18 PROCEDURE — 360N000077 HC SURGERY LEVEL 4, PER MIN: Performed by: ORTHOPAEDIC SURGERY

## 2025-08-18 PROCEDURE — C1776 JOINT DEVICE (IMPLANTABLE): HCPCS | Performed by: ORTHOPAEDIC SURGERY

## 2025-08-18 PROCEDURE — 250N000013 HC RX MED GY IP 250 OP 250 PS 637: Performed by: INTERNAL MEDICINE

## 2025-08-18 PROCEDURE — 258N000001 HC RX 258: Performed by: ORTHOPAEDIC SURGERY

## 2025-08-18 PROCEDURE — 258N000003 HC RX IP 258 OP 636: Performed by: NURSE ANESTHETIST, CERTIFIED REGISTERED

## 2025-08-18 PROCEDURE — 97110 THERAPEUTIC EXERCISES: CPT | Mod: GP

## 2025-08-18 PROCEDURE — 97161 PT EVAL LOW COMPLEX 20 MIN: CPT | Mod: GP

## 2025-08-18 PROCEDURE — 250N000011 HC RX IP 250 OP 636: Performed by: NURSE ANESTHETIST, CERTIFIED REGISTERED

## 2025-08-18 PROCEDURE — 370N000017 HC ANESTHESIA TECHNICAL FEE, PER MIN: Performed by: ORTHOPAEDIC SURGERY

## 2025-08-18 PROCEDURE — 82962 GLUCOSE BLOOD TEST: CPT

## 2025-08-18 PROCEDURE — 710N000010 HC RECOVERY PHASE 1, LEVEL 2, PER MIN: Performed by: ORTHOPAEDIC SURGERY

## 2025-08-18 PROCEDURE — 99232 SBSQ HOSP IP/OBS MODERATE 35: CPT | Performed by: INTERNAL MEDICINE

## 2025-08-18 PROCEDURE — 258N000003 HC RX IP 258 OP 636: Performed by: ORTHOPAEDIC SURGERY

## 2025-08-18 PROCEDURE — C1713 ANCHOR/SCREW BN/BN,TIS/BN: HCPCS | Performed by: ORTHOPAEDIC SURGERY

## 2025-08-18 PROCEDURE — 27130 TOTAL HIP ARTHROPLASTY: CPT | Mod: RT | Performed by: ORTHOPAEDIC SURGERY

## 2025-08-18 PROCEDURE — 250N000011 HC RX IP 250 OP 636: Performed by: ORTHOPAEDIC SURGERY

## 2025-08-18 PROCEDURE — 999N000141 HC STATISTIC PRE-PROCEDURE NURSING ASSESSMENT: Performed by: ORTHOPAEDIC SURGERY

## 2025-08-18 PROCEDURE — 250N000009 HC RX 250: Performed by: ORTHOPAEDIC SURGERY

## 2025-08-18 PROCEDURE — 250N000013 HC RX MED GY IP 250 OP 250 PS 637: Performed by: ORTHOPAEDIC SURGERY

## 2025-08-18 PROCEDURE — 272N000001 HC OR GENERAL SUPPLY STERILE: Performed by: ORTHOPAEDIC SURGERY

## 2025-08-18 PROCEDURE — 999N000063 XR PELVIS AND HIP PORTABLE RIGHT 1 VIEW

## 2025-08-18 DEVICE — IMP HEAD FEMORAL DEPUY CERAMIC 36MM +8MM 1365-36-330: Type: IMPLANTABLE DEVICE | Site: HIP | Status: FUNCTIONAL

## 2025-08-18 DEVICE — IMPLANTABLE DEVICE: Type: IMPLANTABLE DEVICE | Site: HIP | Status: FUNCTIONAL

## 2025-08-18 DEVICE — IMP SCR BONE CAN ACE 6.5X30MM 1217-30-500: Type: IMPLANTABLE DEVICE | Site: HIP | Status: FUNCTIONAL

## 2025-08-18 DEVICE — IMP SHELL ACET DEPUY PINNACLE GRIPTION 54MM 121732054: Type: IMPLANTABLE DEVICE | Site: HIP | Status: FUNCTIONAL

## 2025-08-18 DEVICE — IMP STEM FEM DEPUY ACTIS HI COLLAR OFFSET SZ 5MM 1010-11-050: Type: IMPLANTABLE DEVICE | Site: HIP | Status: FUNCTIONAL

## 2025-08-18 RX ORDER — ACETAMINOPHEN 325 MG/1
650 TABLET ORAL EVERY 4 HOURS PRN
Qty: 100 TABLET | Refills: 0 | Status: SHIPPED | OUTPATIENT
Start: 2025-08-18

## 2025-08-18 RX ORDER — LIDOCAINE 40 MG/G
CREAM TOPICAL
Status: DISCONTINUED | OUTPATIENT
Start: 2025-08-18 | End: 2025-08-18 | Stop reason: HOSPADM

## 2025-08-18 RX ORDER — FAMOTIDINE 20 MG/1
20 TABLET, FILM COATED ORAL DAILY
Status: DISCONTINUED | OUTPATIENT
Start: 2025-08-18 | End: 2025-08-19 | Stop reason: HOSPADM

## 2025-08-18 RX ORDER — ONDANSETRON 2 MG/ML
4 INJECTION INTRAMUSCULAR; INTRAVENOUS EVERY 30 MIN PRN
Status: DISCONTINUED | OUTPATIENT
Start: 2025-08-18 | End: 2025-08-18 | Stop reason: HOSPADM

## 2025-08-18 RX ORDER — SODIUM CHLORIDE, SODIUM LACTATE, POTASSIUM CHLORIDE, CALCIUM CHLORIDE 600; 310; 30; 20 MG/100ML; MG/100ML; MG/100ML; MG/100ML
INJECTION, SOLUTION INTRAVENOUS CONTINUOUS
Status: DISCONTINUED | OUTPATIENT
Start: 2025-08-18 | End: 2025-08-18 | Stop reason: HOSPADM

## 2025-08-18 RX ORDER — CARVEDILOL 6.25 MG/1
6.25 TABLET ORAL 2 TIMES DAILY WITH MEALS
Status: DISCONTINUED | OUTPATIENT
Start: 2025-08-18 | End: 2025-08-18

## 2025-08-18 RX ORDER — FENTANYL CITRATE 50 UG/ML
50 INJECTION, SOLUTION INTRAMUSCULAR; INTRAVENOUS EVERY 5 MIN PRN
Status: DISCONTINUED | OUTPATIENT
Start: 2025-08-18 | End: 2025-08-18 | Stop reason: HOSPADM

## 2025-08-18 RX ORDER — OXYCODONE HYDROCHLORIDE 5 MG/1
5-10 TABLET ORAL EVERY 4 HOURS PRN
Qty: 26 TABLET | Refills: 0 | Status: SHIPPED | OUTPATIENT
Start: 2025-08-18

## 2025-08-18 RX ORDER — HYDROMORPHONE HCL IN WATER/PF 6 MG/30 ML
0.2 PATIENT CONTROLLED ANALGESIA SYRINGE INTRAVENOUS EVERY 4 HOURS PRN
Status: DISCONTINUED | OUTPATIENT
Start: 2025-08-18 | End: 2025-08-19 | Stop reason: HOSPADM

## 2025-08-18 RX ORDER — AMOXICILLIN 250 MG
1-2 CAPSULE ORAL 2 TIMES DAILY
Qty: 30 TABLET | Refills: 0 | Status: SHIPPED | OUTPATIENT
Start: 2025-08-18

## 2025-08-18 RX ORDER — ONDANSETRON 4 MG/1
4 TABLET, ORALLY DISINTEGRATING ORAL EVERY 30 MIN PRN
Status: DISCONTINUED | OUTPATIENT
Start: 2025-08-18 | End: 2025-08-18 | Stop reason: HOSPADM

## 2025-08-18 RX ORDER — ONDANSETRON 2 MG/ML
INJECTION INTRAMUSCULAR; INTRAVENOUS PRN
Status: DISCONTINUED | OUTPATIENT
Start: 2025-08-18 | End: 2025-08-18

## 2025-08-18 RX ORDER — SENNOSIDES 8.6 MG
325 CAPSULE ORAL 2 TIMES DAILY WITH MEALS
Qty: 60 TABLET | Refills: 0 | Status: SHIPPED | OUTPATIENT
Start: 2025-08-18

## 2025-08-18 RX ORDER — ACETAMINOPHEN 325 MG/1
975 TABLET ORAL ONCE
Status: COMPLETED | OUTPATIENT
Start: 2025-08-18 | End: 2025-08-18

## 2025-08-18 RX ORDER — HYDROMORPHONE HYDROCHLORIDE 1 MG/ML
0.5 INJECTION, SOLUTION INTRAMUSCULAR; INTRAVENOUS; SUBCUTANEOUS EVERY 5 MIN PRN
Status: DISCONTINUED | OUTPATIENT
Start: 2025-08-18 | End: 2025-08-18 | Stop reason: HOSPADM

## 2025-08-18 RX ORDER — SENNOSIDES 8.6 MG
325 CAPSULE ORAL 2 TIMES DAILY WITH MEALS
Status: DISCONTINUED | OUTPATIENT
Start: 2025-08-18 | End: 2025-08-19 | Stop reason: HOSPADM

## 2025-08-18 RX ORDER — POLYETHYLENE GLYCOL 3350 17 G/17G
17 POWDER, FOR SOLUTION ORAL 2 TIMES DAILY PRN
Status: DISCONTINUED | OUTPATIENT
Start: 2025-08-18 | End: 2025-08-19 | Stop reason: HOSPADM

## 2025-08-18 RX ORDER — ONDANSETRON 4 MG/1
4 TABLET, ORALLY DISINTEGRATING ORAL EVERY 6 HOURS PRN
Status: DISCONTINUED | OUTPATIENT
Start: 2025-08-18 | End: 2025-08-19 | Stop reason: HOSPADM

## 2025-08-18 RX ORDER — AMOXICILLIN 250 MG
2 CAPSULE ORAL 2 TIMES DAILY
Status: DISCONTINUED | OUTPATIENT
Start: 2025-08-18 | End: 2025-08-19 | Stop reason: HOSPADM

## 2025-08-18 RX ORDER — ONDANSETRON 2 MG/ML
4 INJECTION INTRAMUSCULAR; INTRAVENOUS EVERY 6 HOURS PRN
Status: DISCONTINUED | OUTPATIENT
Start: 2025-08-18 | End: 2025-08-19 | Stop reason: HOSPADM

## 2025-08-18 RX ORDER — VANCOMYCIN HYDROCHLORIDE 1 G/20ML
INJECTION, POWDER, LYOPHILIZED, FOR SOLUTION INTRAVENOUS PRN
Status: DISCONTINUED | OUTPATIENT
Start: 2025-08-18 | End: 2025-08-18 | Stop reason: HOSPADM

## 2025-08-18 RX ORDER — LIDOCAINE 40 MG/G
CREAM TOPICAL
Status: DISCONTINUED | OUTPATIENT
Start: 2025-08-18 | End: 2025-08-19 | Stop reason: HOSPADM

## 2025-08-18 RX ORDER — PROPOFOL 10 MG/ML
INJECTION, EMULSION INTRAVENOUS PRN
Status: DISCONTINUED | OUTPATIENT
Start: 2025-08-18 | End: 2025-08-18

## 2025-08-18 RX ORDER — HYDROMORPHONE HYDROCHLORIDE 1 MG/ML
0.25 INJECTION, SOLUTION INTRAMUSCULAR; INTRAVENOUS; SUBCUTANEOUS EVERY 5 MIN PRN
Status: DISCONTINUED | OUTPATIENT
Start: 2025-08-18 | End: 2025-08-18 | Stop reason: HOSPADM

## 2025-08-18 RX ORDER — PROCHLORPERAZINE MALEATE 5 MG/1
5 TABLET ORAL EVERY 6 HOURS PRN
Status: DISCONTINUED | OUTPATIENT
Start: 2025-08-18 | End: 2025-08-19 | Stop reason: HOSPADM

## 2025-08-18 RX ORDER — DEXAMETHASONE SODIUM PHOSPHATE 10 MG/ML
INJECTION, SOLUTION INTRAMUSCULAR; INTRAVENOUS PRN
Status: DISCONTINUED | OUTPATIENT
Start: 2025-08-18 | End: 2025-08-18

## 2025-08-18 RX ORDER — NALOXONE HYDROCHLORIDE 0.4 MG/ML
0.4 INJECTION, SOLUTION INTRAMUSCULAR; INTRAVENOUS; SUBCUTANEOUS
Status: DISCONTINUED | OUTPATIENT
Start: 2025-08-18 | End: 2025-08-19 | Stop reason: HOSPADM

## 2025-08-18 RX ORDER — SIMVASTATIN 20 MG
20 TABLET ORAL AT BEDTIME
Status: DISCONTINUED | OUTPATIENT
Start: 2025-08-18 | End: 2025-08-19 | Stop reason: HOSPADM

## 2025-08-18 RX ORDER — DEXAMETHASONE SODIUM PHOSPHATE 10 MG/ML
4 INJECTION, SOLUTION INTRAMUSCULAR; INTRAVENOUS
Status: DISCONTINUED | OUTPATIENT
Start: 2025-08-18 | End: 2025-08-18 | Stop reason: HOSPADM

## 2025-08-18 RX ORDER — NALOXONE HYDROCHLORIDE 0.4 MG/ML
0.2 INJECTION, SOLUTION INTRAMUSCULAR; INTRAVENOUS; SUBCUTANEOUS
Status: DISCONTINUED | OUTPATIENT
Start: 2025-08-18 | End: 2025-08-19 | Stop reason: HOSPADM

## 2025-08-18 RX ORDER — CEFAZOLIN SODIUM/WATER 2 G/20 ML
2 SYRINGE (ML) INTRAVENOUS SEE ADMIN INSTRUCTIONS
Status: DISCONTINUED | OUTPATIENT
Start: 2025-08-18 | End: 2025-08-18 | Stop reason: HOSPADM

## 2025-08-18 RX ORDER — OXYCODONE HYDROCHLORIDE 5 MG/1
5 TABLET ORAL EVERY 4 HOURS PRN
Status: DISCONTINUED | OUTPATIENT
Start: 2025-08-18 | End: 2025-08-19 | Stop reason: HOSPADM

## 2025-08-18 RX ORDER — TRANEXAMIC ACID 100 MG/ML
INJECTION, SOLUTION INTRAVENOUS PRN
Status: DISCONTINUED | OUTPATIENT
Start: 2025-08-18 | End: 2025-08-18 | Stop reason: HOSPADM

## 2025-08-18 RX ORDER — BISACODYL 10 MG
10 SUPPOSITORY, RECTAL RECTAL DAILY PRN
Status: DISCONTINUED | OUTPATIENT
Start: 2025-08-18 | End: 2025-08-19 | Stop reason: HOSPADM

## 2025-08-18 RX ORDER — CELECOXIB 100 MG/1
400 CAPSULE ORAL ONCE
Status: COMPLETED | OUTPATIENT
Start: 2025-08-18 | End: 2025-08-18

## 2025-08-18 RX ORDER — HYDROXYZINE HYDROCHLORIDE 10 MG/1
10 TABLET, FILM COATED ORAL EVERY 6 HOURS PRN
Status: DISCONTINUED | OUTPATIENT
Start: 2025-08-18 | End: 2025-08-19 | Stop reason: HOSPADM

## 2025-08-18 RX ORDER — SODIUM CHLORIDE, SODIUM LACTATE, POTASSIUM CHLORIDE, CALCIUM CHLORIDE 600; 310; 30; 20 MG/100ML; MG/100ML; MG/100ML; MG/100ML
INJECTION, SOLUTION INTRAVENOUS CONTINUOUS
Status: DISCONTINUED | OUTPATIENT
Start: 2025-08-18 | End: 2025-08-19 | Stop reason: HOSPADM

## 2025-08-18 RX ORDER — ACETAMINOPHEN 325 MG/1
975 TABLET ORAL EVERY 8 HOURS
Status: DISCONTINUED | OUTPATIENT
Start: 2025-08-18 | End: 2025-08-19 | Stop reason: HOSPADM

## 2025-08-18 RX ORDER — CEFAZOLIN SODIUM/WATER 2 G/20 ML
2 SYRINGE (ML) INTRAVENOUS
Status: COMPLETED | OUTPATIENT
Start: 2025-08-18 | End: 2025-08-18

## 2025-08-18 RX ORDER — FENTANYL CITRATE 50 UG/ML
25 INJECTION, SOLUTION INTRAMUSCULAR; INTRAVENOUS EVERY 5 MIN PRN
Status: DISCONTINUED | OUTPATIENT
Start: 2025-08-18 | End: 2025-08-18 | Stop reason: HOSPADM

## 2025-08-18 RX ORDER — BUPIVACAINE HYDROCHLORIDE 7.5 MG/ML
INJECTION, SOLUTION INTRASPINAL
Status: COMPLETED | OUTPATIENT
Start: 2025-08-18 | End: 2025-08-18

## 2025-08-18 RX ORDER — FENTANYL CITRATE-0.9 % NACL/PF 10 MCG/ML
PLASTIC BAG, INJECTION (ML) INTRAVENOUS PRN
Status: DISCONTINUED | OUTPATIENT
Start: 2025-08-18 | End: 2025-08-18

## 2025-08-18 RX ORDER — NALOXONE HYDROCHLORIDE 0.4 MG/ML
0.1 INJECTION, SOLUTION INTRAMUSCULAR; INTRAVENOUS; SUBCUTANEOUS
Status: DISCONTINUED | OUTPATIENT
Start: 2025-08-18 | End: 2025-08-18 | Stop reason: HOSPADM

## 2025-08-18 RX ORDER — TRANEXAMIC ACID 650 MG/1
1950 TABLET ORAL ONCE
Status: COMPLETED | OUTPATIENT
Start: 2025-08-18 | End: 2025-08-18

## 2025-08-18 RX ORDER — CEFAZOLIN SODIUM 2 G/50ML
2 SOLUTION INTRAVENOUS EVERY 8 HOURS
Status: COMPLETED | OUTPATIENT
Start: 2025-08-18 | End: 2025-08-18

## 2025-08-18 RX ORDER — FENTANYL CITRATE 50 UG/ML
INJECTION, SOLUTION INTRAMUSCULAR; INTRAVENOUS PRN
Status: DISCONTINUED | OUTPATIENT
Start: 2025-08-18 | End: 2025-08-18

## 2025-08-18 RX ORDER — AMOXICILLIN 250 MG
1 CAPSULE ORAL 2 TIMES DAILY
Status: DISCONTINUED | OUTPATIENT
Start: 2025-08-18 | End: 2025-08-18

## 2025-08-18 RX ORDER — HYDROMORPHONE HCL IN WATER/PF 6 MG/30 ML
0.1 PATIENT CONTROLLED ANALGESIA SYRINGE INTRAVENOUS EVERY 4 HOURS PRN
Status: DISCONTINUED | OUTPATIENT
Start: 2025-08-18 | End: 2025-08-19 | Stop reason: HOSPADM

## 2025-08-18 RX ORDER — POLYETHYLENE GLYCOL 3350 17 G/17G
17 POWDER, FOR SOLUTION ORAL DAILY
Status: DISCONTINUED | OUTPATIENT
Start: 2025-08-19 | End: 2025-08-19 | Stop reason: HOSPADM

## 2025-08-18 RX ADMIN — FENTANYL CITRATE 50 MCG: 50 INJECTION INTRAMUSCULAR; INTRAVENOUS at 09:50

## 2025-08-18 RX ADMIN — Medication 2 G: at 07:25

## 2025-08-18 RX ADMIN — FENTANYL CITRATE 25 MCG: 50 INJECTION, SOLUTION INTRAMUSCULAR; INTRAVENOUS at 10:06

## 2025-08-18 RX ADMIN — OXYCODONE HYDROCHLORIDE 5 MG: 5 TABLET ORAL at 12:52

## 2025-08-18 RX ADMIN — BUPIVACAINE HYDROCHLORIDE IN DEXTROSE 2 ML: 7.5 INJECTION, SOLUTION SUBARACHNOID at 07:35

## 2025-08-18 RX ADMIN — SODIUM CHLORIDE, SODIUM LACTATE, POTASSIUM CHLORIDE, AND CALCIUM CHLORIDE: .6; .31; .03; .02 INJECTION, SOLUTION INTRAVENOUS at 07:16

## 2025-08-18 RX ADMIN — PROPOFOL 40 MG: 10 INJECTION, EMULSION INTRAVENOUS at 07:38

## 2025-08-18 RX ADMIN — ASPIRIN 325 MG: 325 TABLET ORAL at 11:46

## 2025-08-18 RX ADMIN — ASPIRIN 325 MG: 325 TABLET ORAL at 18:55

## 2025-08-18 RX ADMIN — ACETAMINOPHEN 975 MG: 325 TABLET ORAL at 06:34

## 2025-08-18 RX ADMIN — SENNOSIDES AND DOCUSATE SODIUM 2 TABLET: 50; 8.6 TABLET ORAL at 20:12

## 2025-08-18 RX ADMIN — Medication 20 MCG/MIN: at 07:48

## 2025-08-18 RX ADMIN — DEXAMETHASONE SODIUM PHOSPHATE 4 MG: 10 INJECTION, SOLUTION INTRAMUSCULAR; INTRAVENOUS at 09:19

## 2025-08-18 RX ADMIN — ONDANSETRON 4 MG: 2 INJECTION INTRAMUSCULAR; INTRAVENOUS at 07:15

## 2025-08-18 RX ADMIN — SENNOSIDES, DOCUSATE SODIUM 1 TABLET: 50; 8.6 TABLET, FILM COATED ORAL at 11:46

## 2025-08-18 RX ADMIN — CEFAZOLIN SODIUM 2 G: 2 SOLUTION INTRAVENOUS at 22:30

## 2025-08-18 RX ADMIN — TRANEXAMIC ACID 1950 MG: 650 TABLET ORAL at 06:33

## 2025-08-18 RX ADMIN — Medication 100 MCG: at 07:47

## 2025-08-18 RX ADMIN — ACETAMINOPHEN 975 MG: 325 TABLET ORAL at 22:30

## 2025-08-18 RX ADMIN — SODIUM CHLORIDE, SODIUM LACTATE, POTASSIUM CHLORIDE, AND CALCIUM CHLORIDE: .6; .31; .03; .02 INJECTION, SOLUTION INTRAVENOUS at 09:34

## 2025-08-18 RX ADMIN — MIDAZOLAM 1 MG: 1 INJECTION INTRAMUSCULAR; INTRAVENOUS at 07:26

## 2025-08-18 RX ADMIN — FENTANYL CITRATE 50 MCG: 50 INJECTION INTRAMUSCULAR; INTRAVENOUS at 09:57

## 2025-08-18 RX ADMIN — FAMOTIDINE 20 MG: 20 TABLET, FILM COATED ORAL at 11:46

## 2025-08-18 RX ADMIN — CEFAZOLIN SODIUM 2 G: 2 SOLUTION INTRAVENOUS at 15:48

## 2025-08-18 RX ADMIN — CELECOXIB 400 MG: 100 CAPSULE ORAL at 06:35

## 2025-08-18 RX ADMIN — PROPOFOL 80 MCG/KG/MIN: 10 INJECTION, EMULSION INTRAVENOUS at 07:39

## 2025-08-18 RX ADMIN — SIMVASTATIN 20 MG: 20 TABLET, FILM COATED ORAL at 20:14

## 2025-08-18 RX ADMIN — ACETAMINOPHEN 975 MG: 325 TABLET ORAL at 15:48

## 2025-08-18 RX ADMIN — SODIUM CHLORIDE, SODIUM LACTATE, POTASSIUM CHLORIDE, AND CALCIUM CHLORIDE: .6; .31; .03; .02 INJECTION, SOLUTION INTRAVENOUS at 11:29

## 2025-08-18 ASSESSMENT — ACTIVITIES OF DAILY LIVING (ADL)
ADLS_ACUITY_SCORE: 29
ADLS_ACUITY_SCORE: 31
ADLS_ACUITY_SCORE: 24
ADLS_ACUITY_SCORE: 31
ADLS_ACUITY_SCORE: 24
ADLS_ACUITY_SCORE: 29
ADLS_ACUITY_SCORE: 31
ADLS_ACUITY_SCORE: 29
ADLS_ACUITY_SCORE: 32
ADLS_ACUITY_SCORE: 29
ADLS_ACUITY_SCORE: 31
ADLS_ACUITY_SCORE: 31
ADLS_ACUITY_SCORE: 32

## 2025-08-19 ENCOUNTER — TELEPHONE (OUTPATIENT)
Dept: FAMILY MEDICINE | Facility: CLINIC | Age: 75
End: 2025-08-19
Payer: COMMERCIAL

## 2025-08-19 VITALS
RESPIRATION RATE: 16 BRPM | OXYGEN SATURATION: 100 % | DIASTOLIC BLOOD PRESSURE: 55 MMHG | HEART RATE: 63 BPM | TEMPERATURE: 98 F | SYSTOLIC BLOOD PRESSURE: 118 MMHG

## 2025-08-19 LAB
ANION GAP SERPL CALCULATED.3IONS-SCNC: 12 MMOL/L (ref 7–15)
BUN SERPL-MCNC: 30.2 MG/DL (ref 8–23)
CALCIUM SERPL-MCNC: 9.3 MG/DL (ref 8.8–10.4)
CHLORIDE SERPL-SCNC: 99 MMOL/L (ref 98–107)
CREAT SERPL-MCNC: 1.26 MG/DL (ref 0.51–0.95)
EGFRCR SERPLBLD CKD-EPI 2021: 44 ML/MIN/1.73M2
FASTING STATUS PATIENT QL REPORTED: YES
FASTING STATUS PATIENT QL REPORTED: YES
GLUCOSE SERPL-MCNC: 117 MG/DL (ref 70–99)
GLUCOSE SERPL-MCNC: 118 MG/DL (ref 70–99)
HCO3 SERPL-SCNC: 22 MMOL/L (ref 22–29)
HGB BLD-MCNC: 8.4 G/DL (ref 11.7–15.7)
MCV RBC AUTO: 92.5 FL (ref 78–100)
POTASSIUM SERPL-SCNC: 4.2 MMOL/L (ref 3.4–5.3)
SODIUM SERPL-SCNC: 133 MMOL/L (ref 135–145)

## 2025-08-19 PROCEDURE — 999N000111 HC STATISTIC OT IP EVAL DEFER

## 2025-08-19 PROCEDURE — 99232 SBSQ HOSP IP/OBS MODERATE 35: CPT | Performed by: NURSE PRACTITIONER

## 2025-08-19 PROCEDURE — 85018 HEMOGLOBIN: CPT | Performed by: ORTHOPAEDIC SURGERY

## 2025-08-19 PROCEDURE — 80048 BASIC METABOLIC PNL TOTAL CA: CPT | Performed by: NURSE PRACTITIONER

## 2025-08-19 PROCEDURE — 250N000013 HC RX MED GY IP 250 OP 250 PS 637: Performed by: INTERNAL MEDICINE

## 2025-08-19 PROCEDURE — 97530 THERAPEUTIC ACTIVITIES: CPT | Mod: GP | Performed by: PHYSICAL THERAPIST

## 2025-08-19 PROCEDURE — 250N000013 HC RX MED GY IP 250 OP 250 PS 637: Performed by: ORTHOPAEDIC SURGERY

## 2025-08-19 PROCEDURE — 82947 ASSAY GLUCOSE BLOOD QUANT: CPT | Performed by: ORTHOPAEDIC SURGERY

## 2025-08-19 PROCEDURE — 97116 GAIT TRAINING THERAPY: CPT | Mod: GP | Performed by: PHYSICAL THERAPIST

## 2025-08-19 PROCEDURE — 258N000003 HC RX IP 258 OP 636: Performed by: ORTHOPAEDIC SURGERY

## 2025-08-19 PROCEDURE — 36415 COLL VENOUS BLD VENIPUNCTURE: CPT | Performed by: ORTHOPAEDIC SURGERY

## 2025-08-19 PROCEDURE — 97110 THERAPEUTIC EXERCISES: CPT | Mod: GP | Performed by: PHYSICAL THERAPIST

## 2025-08-19 RX ADMIN — POLYETHYLENE GLYCOL 3350 17 G: 17 POWDER, FOR SOLUTION ORAL at 08:19

## 2025-08-19 RX ADMIN — ACETAMINOPHEN 975 MG: 325 TABLET ORAL at 06:25

## 2025-08-19 RX ADMIN — FAMOTIDINE 20 MG: 20 TABLET, FILM COATED ORAL at 08:19

## 2025-08-19 RX ADMIN — SENNOSIDES AND DOCUSATE SODIUM 2 TABLET: 50; 8.6 TABLET ORAL at 08:19

## 2025-08-19 RX ADMIN — ASPIRIN 325 MG: 325 TABLET ORAL at 08:19

## 2025-08-19 RX ADMIN — SODIUM CHLORIDE, SODIUM LACTATE, POTASSIUM CHLORIDE, AND CALCIUM CHLORIDE: .6; .31; .03; .02 INJECTION, SOLUTION INTRAVENOUS at 00:59

## 2025-08-19 RX ADMIN — OXYCODONE HYDROCHLORIDE 5 MG: 5 TABLET ORAL at 08:18

## 2025-08-19 RX ADMIN — OXYCODONE 2.5 MG: 5 TABLET ORAL at 00:59

## 2025-08-19 ASSESSMENT — ACTIVITIES OF DAILY LIVING (ADL)
ADLS_ACUITY_SCORE: 31

## 2025-08-21 ENCOUNTER — OFFICE VISIT (OUTPATIENT)
Dept: FAMILY MEDICINE | Facility: CLINIC | Age: 75
End: 2025-08-21
Payer: COMMERCIAL

## 2025-08-21 VITALS
TEMPERATURE: 97.6 F | RESPIRATION RATE: 12 BRPM | HEIGHT: 62 IN | SYSTOLIC BLOOD PRESSURE: 133 MMHG | HEART RATE: 80 BPM | BODY MASS INDEX: 23.19 KG/M2 | WEIGHT: 126 LBS | OXYGEN SATURATION: 100 % | DIASTOLIC BLOOD PRESSURE: 68 MMHG

## 2025-08-21 DIAGNOSIS — Z98.890 POST-OPERATIVE STATE: Primary | ICD-10-CM

## 2025-08-21 DIAGNOSIS — I10 HTN, GOAL BELOW 140/90: ICD-10-CM

## 2025-08-21 DIAGNOSIS — R79.89 ELEVATED SERUM CREATININE: ICD-10-CM

## 2025-08-21 DIAGNOSIS — D64.9 LOW HEMOGLOBIN: ICD-10-CM

## 2025-08-21 LAB
HGB BLD-MCNC: 9.1 G/DL (ref 11.7–15.7)
MCV RBC AUTO: 94.9 FL (ref 78–100)

## 2025-08-21 ASSESSMENT — PAIN SCALES - GENERAL: PAINLEVEL_OUTOF10: MODERATE PAIN (4)

## 2025-08-25 ENCOUNTER — RESULTS FOLLOW-UP (OUTPATIENT)
Dept: FAMILY MEDICINE | Facility: CLINIC | Age: 75
End: 2025-08-25

## 2025-09-04 ENCOUNTER — OFFICE VISIT (OUTPATIENT)
Dept: ORTHOPEDICS | Facility: CLINIC | Age: 75
End: 2025-09-04
Payer: COMMERCIAL

## 2025-09-04 ENCOUNTER — OFFICE VISIT (OUTPATIENT)
Dept: FAMILY MEDICINE | Facility: CLINIC | Age: 75
End: 2025-09-04
Payer: COMMERCIAL

## 2025-09-04 VITALS
OXYGEN SATURATION: 99 % | HEART RATE: 78 BPM | WEIGHT: 126 LBS | HEIGHT: 62 IN | SYSTOLIC BLOOD PRESSURE: 150 MMHG | RESPIRATION RATE: 20 BRPM | DIASTOLIC BLOOD PRESSURE: 70 MMHG | BODY MASS INDEX: 23.19 KG/M2 | TEMPERATURE: 98.2 F

## 2025-09-04 VITALS — OXYGEN SATURATION: 99 % | HEART RATE: 72 BPM | DIASTOLIC BLOOD PRESSURE: 67 MMHG | SYSTOLIC BLOOD PRESSURE: 157 MMHG

## 2025-09-04 DIAGNOSIS — I10 BENIGN ESSENTIAL HYPERTENSION: Primary | ICD-10-CM

## 2025-09-04 DIAGNOSIS — N18.31 CHRONIC KIDNEY DISEASE, STAGE 3A (H): ICD-10-CM

## 2025-09-04 DIAGNOSIS — Z96.641 STATUS POST TOTAL REPLACEMENT OF RIGHT HIP: Primary | ICD-10-CM

## 2025-09-04 ASSESSMENT — PAIN SCALES - GENERAL
PAINLEVEL_OUTOF10: MILD PAIN (2)
PAINLEVEL_OUTOF10: MILD PAIN (2)

## (undated) DEVICE — DRAPE TIBURON HIP W/POUCH 29439

## (undated) DEVICE — ESU PENCIL SMOKE EVAC W/ROCKER SWITCH 0703-047-000

## (undated) DEVICE — SU MONOCRYL 3-0 PS-2 18" UND Y497G

## (undated) DEVICE — SU VICRYL 2-0 CT-1 27" UND J259H

## (undated) DEVICE — PACK TOTAL JOINT STD LATEX

## (undated) DEVICE — HOOD FLYTE 0408-800-000

## (undated) DEVICE — GLOVE PROTEXIS BLUE W/NEU-THERA 8.0  2D73EB80

## (undated) DEVICE — DRAPE STERI TOWEL SM 1000

## (undated) DEVICE — SOL NACL 0.9% IRRIG 3000ML BAG 07972-08

## (undated) DEVICE — SU STRATAFIX PDS PLUS 1 CT-1 18" SXPP1A404

## (undated) DEVICE — BLADE SAW SAGITTAL STRK 18X90X1.19MM HD SYS 6 6118-119-090

## (undated) DEVICE — SUCTION IRR SYSTEM W/O TIP INTERPULSE HANDPIECE 0210-100-000

## (undated) DEVICE — SYR 10ML PREFILLED 0.9% NACL INJ NOT STERILE 306547

## (undated) DEVICE — DRSG AQUACEL AG 3.5X9.75" HYDROFIBER 412011

## (undated) DEVICE — SUCTION MANIFOLD NEPTUNE 2 SYS 4 PORT 0702-020-000

## (undated) DEVICE — PREP CHLORAPREP 26ML TINTED ORANGE  260815

## (undated) DEVICE — SU VICRYL 1 CT-1 27" J341H

## (undated) DEVICE — Device

## (undated) DEVICE — ESU ELEC BLADE 6" COATED E1450-6

## (undated) DEVICE — SU VICRYL 0 CTX 36" UND J978H

## (undated) DEVICE — SYR 50ML LL W/O NDL 309653

## (undated) DEVICE — SOL WATER IRRIG 1000ML BOTTLE 07139-09

## (undated) DEVICE — DRAPE IOBAN INCISE 36X23" 6651EZ

## (undated) DEVICE — SU ETHIBOND 2 V-37 4X30" MX69G

## (undated) DEVICE — PAD FOAM MCGUIRE KIT 0814-0150

## (undated) DEVICE — SU DERMABOND ADVANCED .7ML DNX12

## (undated) DEVICE — STRAP STIRRUP W/SLIP 30187-030

## (undated) DEVICE — DRSG AQUACEL AG HYDROFIBER  3.5X10" 422605

## (undated) DEVICE — DRAPE POUCH INSTRUMENT 1018

## (undated) DEVICE — FILTER HEPA FLUID TRAP NEPTUNE 0703-040-001

## (undated) DEVICE — ESU GROUND PAD UNIVERSAL W/O CORD

## (undated) DEVICE — BONE CLEANING TIP INTERPULSE  0210-010-000

## (undated) DEVICE — SU VICRYL 2-0 CT-2 27" UND J269H

## (undated) DEVICE — SU STRATAFIX PDS PLUS CT-1 30CM SXPP1A435

## (undated) DEVICE — DRSG STERI STRIP 1/2X4" R1547

## (undated) DEVICE — SPONGE RAY-TEC 4X8" 7318

## (undated) DEVICE — IMM PILLOW ABDUCT HIP MED 0814-8033

## (undated) DEVICE — DEVICE RETRIEVER ACL

## (undated) RX ORDER — FENTANYL CITRATE 50 UG/ML
INJECTION, SOLUTION INTRAMUSCULAR; INTRAVENOUS
Status: DISPENSED
Start: 2025-08-18

## (undated) RX ORDER — PROPOFOL 10 MG/ML
INJECTION, EMULSION INTRAVENOUS
Status: DISPENSED
Start: 2025-08-18

## (undated) RX ORDER — DEXAMETHASONE SODIUM PHOSPHATE 10 MG/ML
INJECTION, SOLUTION INTRAMUSCULAR; INTRAVENOUS
Status: DISPENSED
Start: 2025-08-18

## (undated) RX ORDER — PHENYLEPHRINE HYDROCHLORIDE 10 MG/ML
INJECTION INTRAVENOUS
Status: DISPENSED
Start: 2025-08-18

## (undated) RX ORDER — ONDANSETRON 2 MG/ML
INJECTION INTRAMUSCULAR; INTRAVENOUS
Status: DISPENSED
Start: 2025-08-18